# Patient Record
Sex: MALE | Race: WHITE | Employment: OTHER | URBAN - METROPOLITAN AREA
[De-identification: names, ages, dates, MRNs, and addresses within clinical notes are randomized per-mention and may not be internally consistent; named-entity substitution may affect disease eponyms.]

---

## 2017-01-05 ENCOUNTER — HOSPITAL ENCOUNTER (OUTPATIENT)
Dept: PHARMACY | Age: 77
Discharge: HOME | End: 2017-01-05
Payer: MEDICARE

## 2017-01-05 DIAGNOSIS — T81.718D: ICD-10-CM

## 2017-01-05 LAB
INR BLD: 3.1
PROTIME: 36.7 SECONDS

## 2017-01-05 PROCEDURE — G0463 HOSPITAL OUTPT CLINIC VISIT: HCPCS

## 2017-01-05 PROCEDURE — 85610 PROTHROMBIN TIME: CPT

## 2017-02-17 ENCOUNTER — HOSPITAL ENCOUNTER (OUTPATIENT)
Dept: PHARMACY | Age: 77
Discharge: HOME | End: 2017-02-17
Payer: MEDICARE

## 2017-02-17 ENCOUNTER — HOSPITAL ENCOUNTER (OUTPATIENT)
Dept: CARDIOLOGY | Age: 77
Discharge: HOME | End: 2017-02-17
Payer: MEDICARE

## 2017-02-17 DIAGNOSIS — T81.718D: ICD-10-CM

## 2017-02-17 LAB
INR BLD: 3.1
PROTIME: 37.7 SECONDS

## 2017-02-17 PROCEDURE — G0463 HOSPITAL OUTPT CLINIC VISIT: HCPCS

## 2017-02-17 PROCEDURE — 85610 PROTHROMBIN TIME: CPT

## 2017-02-17 PROCEDURE — 93280 PM DEVICE PROGR EVAL DUAL: CPT

## 2017-03-17 ENCOUNTER — HOSPITAL ENCOUNTER (OUTPATIENT)
Dept: PHARMACY | Age: 77
Discharge: HOME | End: 2017-03-17
Payer: MEDICARE

## 2017-03-17 DIAGNOSIS — T81.718A: ICD-10-CM

## 2017-03-17 LAB
INR BLD: 3.1
PROTIME: 37.7 SECONDS

## 2017-03-17 PROCEDURE — G0463 HOSPITAL OUTPT CLINIC VISIT: HCPCS

## 2017-03-17 PROCEDURE — 85610 PROTHROMBIN TIME: CPT

## 2017-04-25 RX ORDER — WARFARIN SODIUM 5 MG/1
TABLET ORAL
Qty: 110 TABLET | Refills: 1 | Status: SHIPPED | OUTPATIENT
Start: 2017-04-25 | End: 2017-11-03 | Stop reason: SDUPTHER

## 2017-04-25 RX ORDER — WARFARIN SODIUM 5 MG/1
5 TABLET ORAL
COMMUNITY
End: 2017-04-25 | Stop reason: SDUPTHER

## 2017-04-28 ENCOUNTER — HOSPITAL ENCOUNTER (OUTPATIENT)
Dept: PHARMACY | Age: 77
Discharge: HOME | End: 2017-04-28
Payer: MEDICARE

## 2017-04-28 DIAGNOSIS — T81.718D: ICD-10-CM

## 2017-04-28 LAB
INR BLD: 2.7
PROTIME: 31.9 SECONDS

## 2017-04-28 PROCEDURE — 99211 OFF/OP EST MAY X REQ PHY/QHP: CPT | Performed by: PHARMACIST

## 2017-04-28 PROCEDURE — 85610 PROTHROMBIN TIME: CPT | Performed by: PHARMACIST

## 2017-05-22 ENCOUNTER — HOSPITAL ENCOUNTER (OUTPATIENT)
Dept: CARDIOLOGY | Age: 77
Discharge: HOME | End: 2017-05-22
Payer: MEDICARE

## 2017-05-22 PROCEDURE — 93296 REM INTERROG EVL PM/IDS: CPT

## 2017-06-09 ENCOUNTER — HOSPITAL ENCOUNTER (OUTPATIENT)
Dept: PHARMACY | Age: 77
Discharge: HOME | End: 2017-06-09
Payer: MEDICARE

## 2017-06-09 DIAGNOSIS — T81.718D: ICD-10-CM

## 2017-06-09 LAB
INR BLD: 3.4
PROTIME: 40.7 SECONDS

## 2017-06-09 PROCEDURE — 85610 PROTHROMBIN TIME: CPT

## 2017-06-09 PROCEDURE — 99211 OFF/OP EST MAY X REQ PHY/QHP: CPT

## 2017-07-21 ENCOUNTER — HOSPITAL ENCOUNTER (OUTPATIENT)
Dept: PHARMACY | Age: 77
Setting detail: THERAPIES SERIES
Discharge: HOME | End: 2017-07-21
Payer: MEDICARE

## 2017-07-21 DIAGNOSIS — T81.718A: ICD-10-CM

## 2017-07-21 LAB
INR BLD: 2.3
PROTIME: 27.3 SECONDS

## 2017-07-21 PROCEDURE — 85610 PROTHROMBIN TIME: CPT

## 2017-07-21 PROCEDURE — 99211 OFF/OP EST MAY X REQ PHY/QHP: CPT

## 2017-08-21 ENCOUNTER — HOSPITAL ENCOUNTER (OUTPATIENT)
Dept: CARDIOLOGY | Age: 77
Discharge: HOME | End: 2017-08-21
Payer: MEDICARE

## 2017-08-21 PROCEDURE — 93280 PM DEVICE PROGR EVAL DUAL: CPT

## 2017-09-01 ENCOUNTER — HOSPITAL ENCOUNTER (OUTPATIENT)
Dept: PHARMACY | Age: 77
Setting detail: THERAPIES SERIES
Discharge: HOME | End: 2017-09-01
Payer: MEDICARE

## 2017-09-01 ENCOUNTER — TELEPHONE (OUTPATIENT)
Dept: PHARMACY | Age: 77
End: 2017-09-01

## 2017-09-01 ENCOUNTER — ANTI-COAG VISIT (OUTPATIENT)
Dept: PHARMACY | Age: 77
End: 2017-09-01

## 2017-09-01 DIAGNOSIS — T81.718S: ICD-10-CM

## 2017-09-01 LAB
INR BLD: 3.5
PROTIME: 41.7 SECONDS

## 2017-09-01 PROCEDURE — 85610 PROTHROMBIN TIME: CPT

## 2017-09-01 PROCEDURE — 99211 OFF/OP EST MAY X REQ PHY/QHP: CPT

## 2017-09-01 RX ORDER — M-VIT,TX,IRON,MINS/CALC/FOLIC 27MG-0.4MG
1 TABLET ORAL DAILY
Status: ON HOLD | COMMUNITY
End: 2019-07-16

## 2017-09-01 RX ORDER — HYDROCHLOROTHIAZIDE 25 MG/1
25 TABLET ORAL DAILY
COMMUNITY
End: 2018-04-24 | Stop reason: ALTCHOICE

## 2017-09-01 RX ORDER — METOPROLOL SUCCINATE 25 MG/1
100 TABLET, EXTENDED RELEASE ORAL DAILY
COMMUNITY
End: 2017-11-03 | Stop reason: DRUGHIGH

## 2017-09-22 ENCOUNTER — HOSPITAL ENCOUNTER (OUTPATIENT)
Dept: PHARMACY | Age: 77
Setting detail: THERAPIES SERIES
Discharge: HOME | End: 2017-09-22
Payer: MEDICARE

## 2017-09-22 DIAGNOSIS — T81.718D: ICD-10-CM

## 2017-09-22 LAB
INR BLD: 2.5
PROTIME: 29.6 SECONDS

## 2017-09-22 PROCEDURE — 85610 PROTHROMBIN TIME: CPT

## 2017-09-22 PROCEDURE — 99211 OFF/OP EST MAY X REQ PHY/QHP: CPT

## 2017-09-22 RX ORDER — LISINOPRIL 10 MG/1
10 TABLET ORAL DAILY
COMMUNITY
End: 2017-11-03 | Stop reason: DRUGHIGH

## 2017-11-03 ENCOUNTER — HOSPITAL ENCOUNTER (OUTPATIENT)
Dept: PHARMACY | Age: 77
Setting detail: THERAPIES SERIES
Discharge: HOME | End: 2017-11-03
Payer: MEDICARE

## 2017-11-03 LAB
INR BLD: 2.3
PROTIME: 27.2 SECONDS

## 2017-11-03 PROCEDURE — 99211 OFF/OP EST MAY X REQ PHY/QHP: CPT

## 2017-11-03 PROCEDURE — 85610 PROTHROMBIN TIME: CPT

## 2017-11-03 RX ORDER — SOTALOL HYDROCHLORIDE 120 MG/1
120 TABLET ORAL 2 TIMES DAILY
COMMUNITY
End: 2020-06-05

## 2017-11-03 RX ORDER — METOPROLOL SUCCINATE 100 MG/1
100 TABLET, EXTENDED RELEASE ORAL DAILY
Status: ON HOLD | COMMUNITY
End: 2022-05-25 | Stop reason: HOSPADM

## 2017-11-03 RX ORDER — AMLODIPINE BESYLATE 5 MG/1
5 TABLET ORAL DAILY
Status: ON HOLD | COMMUNITY
End: 2022-05-25 | Stop reason: HOSPADM

## 2017-11-03 RX ORDER — ATORVASTATIN CALCIUM 10 MG/1
5 TABLET, FILM COATED ORAL DAILY
COMMUNITY

## 2017-11-03 RX ORDER — WARFARIN SODIUM 5 MG/1
TABLET ORAL
Qty: 110 TABLET | Refills: 1 | Status: SHIPPED | OUTPATIENT
Start: 2017-11-03 | End: 2018-03-09 | Stop reason: SDUPTHER

## 2017-11-03 RX ORDER — LISINOPRIL 20 MG/1
20 TABLET ORAL 2 TIMES DAILY
Status: ON HOLD | COMMUNITY
End: 2022-05-25 | Stop reason: HOSPADM

## 2017-11-03 NOTE — PROGRESS NOTES
Mr. Kortney Cosby is a 68 y.o. y/o male with history of PE who presents today for anticoagulation monitoring and adjustment. INR 2.3 is therapeutic for this patient (goal range 2.0-3.0) and is reflective of 37.5 mg TWD  Patient verifies current dosing regimen, patient able to verbally recall dose  Patient reports zero  missed doses since last INR   Patient denies s/sx clotting and/or stroke  Patient denies hematuria, epistaxis, rectal bleeding  Patient denies changes in diet, alcohol, or tobacco use  Reviewed medication list and drug allergies with patient, updated any medication additions or modifications accordingly    Patient started on metoprolol succ, lisinopril, sotalol, atorvastatin, amlodipine. And HCTZ discontinued. All medication updates are reflected in medication list on chart. Patient also denies any pending medical or dental procedures scheduled at this time    Was in [address] [place] [address] a week after last INR visit due heart rhythm. Was discharged on Eliquis, however, was too expensive and warfarin was restarted on 10/19.     Patient was instructed to continue current plan of 37.5 mg TWD and RTC 6 weeks

## 2017-11-20 ENCOUNTER — HOSPITAL ENCOUNTER (OUTPATIENT)
Dept: CARDIOLOGY | Age: 77
Discharge: HOME | End: 2017-11-20
Payer: MEDICARE

## 2017-11-20 PROCEDURE — 93296 REM INTERROG EVL PM/IDS: CPT

## 2017-12-15 ENCOUNTER — HOSPITAL ENCOUNTER (OUTPATIENT)
Dept: PHARMACY | Age: 77
Setting detail: THERAPIES SERIES
Discharge: HOME | End: 2017-12-15
Payer: MEDICARE

## 2017-12-15 DIAGNOSIS — T81.718A IATROGENIC PULMONARY EMBOLISM, INITIAL ENCOUNTER (HCC): ICD-10-CM

## 2017-12-15 DIAGNOSIS — I26.99 IATROGENIC PULMONARY EMBOLISM, INITIAL ENCOUNTER (HCC): ICD-10-CM

## 2017-12-15 LAB
INR BLD: 3
PROTIME: 35.8 SECONDS

## 2017-12-15 PROCEDURE — 85610 PROTHROMBIN TIME: CPT

## 2017-12-15 PROCEDURE — 99211 OFF/OP EST MAY X REQ PHY/QHP: CPT

## 2018-01-26 ENCOUNTER — HOSPITAL ENCOUNTER (OUTPATIENT)
Dept: PHARMACY | Age: 78
Setting detail: THERAPIES SERIES
Discharge: HOME | End: 2018-01-26
Payer: MEDICARE

## 2018-01-26 DIAGNOSIS — I26.99 IATROGENIC PULMONARY EMBOLISM, SUBSEQUENT ENCOUNTER (HCC): ICD-10-CM

## 2018-01-26 DIAGNOSIS — T81.718D IATROGENIC PULMONARY EMBOLISM, SUBSEQUENT ENCOUNTER (HCC): ICD-10-CM

## 2018-01-26 LAB
INR BLD: 2.7
PROTIME: 32 SECONDS

## 2018-01-26 PROCEDURE — 99211 OFF/OP EST MAY X REQ PHY/QHP: CPT | Performed by: PHARMACIST

## 2018-01-26 PROCEDURE — 85610 PROTHROMBIN TIME: CPT | Performed by: PHARMACIST

## 2018-02-19 ENCOUNTER — HOSPITAL ENCOUNTER (OUTPATIENT)
Dept: CARDIOLOGY | Age: 78
Discharge: HOME | End: 2018-02-19
Payer: MEDICARE

## 2018-02-19 PROCEDURE — 93280 PM DEVICE PROGR EVAL DUAL: CPT

## 2018-03-09 ENCOUNTER — HOSPITAL ENCOUNTER (OUTPATIENT)
Dept: PHARMACY | Age: 78
Setting detail: THERAPIES SERIES
Discharge: HOME | End: 2018-03-09
Payer: MEDICARE

## 2018-03-09 DIAGNOSIS — T81.718D IATROGENIC PULMONARY EMBOLISM, SUBSEQUENT ENCOUNTER (HCC): ICD-10-CM

## 2018-03-09 DIAGNOSIS — I26.99 IATROGENIC PULMONARY EMBOLISM, SUBSEQUENT ENCOUNTER (HCC): ICD-10-CM

## 2018-03-09 LAB
INR BLD: 2.9
PROTIME: 35.4 SECONDS

## 2018-03-09 PROCEDURE — 99211 OFF/OP EST MAY X REQ PHY/QHP: CPT | Performed by: PHARMACIST

## 2018-03-09 PROCEDURE — 85610 PROTHROMBIN TIME: CPT | Performed by: PHARMACIST

## 2018-03-09 RX ORDER — WARFARIN SODIUM 5 MG/1
TABLET ORAL
Qty: 110 TABLET | Refills: 1 | Status: SHIPPED | OUTPATIENT
Start: 2018-03-09 | End: 2018-04-24 | Stop reason: ALTCHOICE

## 2018-04-20 ENCOUNTER — HOSPITAL ENCOUNTER (OUTPATIENT)
Dept: PHARMACY | Age: 78
Setting detail: THERAPIES SERIES
Discharge: HOME | End: 2018-04-20
Payer: MEDICARE

## 2018-04-20 DIAGNOSIS — T81.718D IATROGENIC PULMONARY EMBOLISM, SUBSEQUENT ENCOUNTER (HCC): ICD-10-CM

## 2018-04-20 DIAGNOSIS — I26.99 IATROGENIC PULMONARY EMBOLISM, SUBSEQUENT ENCOUNTER (HCC): ICD-10-CM

## 2018-04-20 LAB
INR BLD: 2.6
PROTIME: 31.8 SECONDS

## 2018-04-20 PROCEDURE — 85610 PROTHROMBIN TIME: CPT | Performed by: PHARMACIST

## 2018-04-20 PROCEDURE — 99211 OFF/OP EST MAY X REQ PHY/QHP: CPT | Performed by: PHARMACIST

## 2018-04-24 ENCOUNTER — OFFICE VISIT (OUTPATIENT)
Dept: PULMONOLOGY | Age: 78
End: 2018-04-24
Payer: MEDICARE

## 2018-04-24 VITALS
OXYGEN SATURATION: 98 % | RESPIRATION RATE: 16 BRPM | SYSTOLIC BLOOD PRESSURE: 110 MMHG | HEIGHT: 75 IN | HEART RATE: 79 BPM | BODY MASS INDEX: 32.08 KG/M2 | DIASTOLIC BLOOD PRESSURE: 64 MMHG | TEMPERATURE: 97 F | WEIGHT: 258 LBS

## 2018-04-24 DIAGNOSIS — G47.33 OSA ON CPAP: Primary | ICD-10-CM

## 2018-04-24 DIAGNOSIS — Z99.89 OSA ON CPAP: Primary | ICD-10-CM

## 2018-04-24 PROBLEM — G47.30 SLEEP APNEA: Status: ACTIVE | Noted: 2018-04-24

## 2018-04-24 PROCEDURE — 1123F ACP DISCUSS/DSCN MKR DOCD: CPT | Performed by: INTERNAL MEDICINE

## 2018-04-24 PROCEDURE — 99213 OFFICE O/P EST LOW 20 MIN: CPT | Performed by: INTERNAL MEDICINE

## 2018-04-24 PROCEDURE — G8417 CALC BMI ABV UP PARAM F/U: HCPCS | Performed by: INTERNAL MEDICINE

## 2018-04-24 PROCEDURE — 4040F PNEUMOC VAC/ADMIN/RCVD: CPT | Performed by: INTERNAL MEDICINE

## 2018-04-24 PROCEDURE — 1036F TOBACCO NON-USER: CPT | Performed by: INTERNAL MEDICINE

## 2018-04-24 PROCEDURE — G8427 DOCREV CUR MEDS BY ELIG CLIN: HCPCS | Performed by: INTERNAL MEDICINE

## 2018-04-24 RX ORDER — WARFARIN SODIUM 5 MG/1
5 TABLET ORAL DAILY
COMMUNITY
End: 2019-02-01 | Stop reason: SDUPTHER

## 2018-04-24 ASSESSMENT — ENCOUNTER SYMPTOMS
ABDOMINAL PAIN: 0
SHORTNESS OF BREATH: 1
SORE THROAT: 0
CHEST TIGHTNESS: 0
WHEEZING: 0
VOMITING: 0
RHINORRHEA: 0
NAUSEA: 0
DIARRHEA: 0
COUGH: 0
SINUS PRESSURE: 0

## 2018-06-01 ENCOUNTER — HOSPITAL ENCOUNTER (OUTPATIENT)
Dept: PHARMACY | Age: 78
Setting detail: THERAPIES SERIES
Discharge: HOME | End: 2018-06-01
Payer: MEDICARE

## 2018-06-01 DIAGNOSIS — T81.718S: ICD-10-CM

## 2018-06-01 DIAGNOSIS — I26.99: ICD-10-CM

## 2018-06-01 LAB
INR BLD: 3.2
PROTIME: 38.3 SECONDS

## 2018-06-01 PROCEDURE — 85610 PROTHROMBIN TIME: CPT

## 2018-06-01 PROCEDURE — 99211 OFF/OP EST MAY X REQ PHY/QHP: CPT

## 2018-07-06 ENCOUNTER — HOSPITAL ENCOUNTER (OUTPATIENT)
Dept: CARDIOLOGY | Age: 78
Discharge: HOME | End: 2018-07-06
Payer: MEDICARE

## 2018-07-06 PROCEDURE — 93296 REM INTERROG EVL PM/IDS: CPT

## 2018-07-13 ENCOUNTER — HOSPITAL ENCOUNTER (OUTPATIENT)
Dept: PHARMACY | Age: 78
Setting detail: THERAPIES SERIES
Discharge: HOME | End: 2018-07-13
Payer: MEDICARE

## 2018-07-13 DIAGNOSIS — T81.718D IATROGENIC PULMONARY EMBOLISM, SUBSEQUENT ENCOUNTER (HCC): ICD-10-CM

## 2018-07-13 DIAGNOSIS — I26.99 IATROGENIC PULMONARY EMBOLISM, SUBSEQUENT ENCOUNTER (HCC): ICD-10-CM

## 2018-07-13 LAB
INR BLD: 2.8
PROTIME: 33.4 SECONDS

## 2018-07-13 PROCEDURE — 85610 PROTHROMBIN TIME: CPT | Performed by: PHARMACIST

## 2018-07-13 PROCEDURE — 99211 OFF/OP EST MAY X REQ PHY/QHP: CPT | Performed by: PHARMACIST

## 2018-07-13 RX ORDER — TAMSULOSIN HYDROCHLORIDE 0.4 MG/1
0.4 CAPSULE ORAL DAILY
COMMUNITY
Start: 2018-06-26

## 2018-07-13 NOTE — PROGRESS NOTES
Mr. Justin Olivera is a 66 y.o. y/o male with history of PE who presents today for anticoagulation monitoring and adjustment.   INR 2.8 is therapeutic for this patient (goal range 2-3) and is reflective of 37.5 mg TWD  Patient verifies current dosing regimen, patient able to verbally recall dose  Patient reports no missed doses since last INR   Patient denies s/sx clotting and/or stroke  Patient denies hematuria, epistaxis, rectal bleeding  Patient denies changes in diet, alcohol, or tobacco use  Reviewed medication list and drug allergies with patient, updated any medication additions or modifications accordingly  Patient also denies any pending medical or dental procedures scheduled at this time  Patient was instructed to continue 37.5 mg TWD and RTC 6 weeks

## 2018-08-24 ENCOUNTER — HOSPITAL ENCOUNTER (OUTPATIENT)
Dept: PHARMACY | Age: 78
Setting detail: THERAPIES SERIES
Discharge: HOME | End: 2018-08-24
Payer: MEDICARE

## 2018-08-24 DIAGNOSIS — T81.718S: ICD-10-CM

## 2018-08-24 DIAGNOSIS — I26.99: ICD-10-CM

## 2018-08-24 LAB
INR BLD: 3.8
PROTIME: 45 SECONDS

## 2018-08-24 PROCEDURE — 99211 OFF/OP EST MAY X REQ PHY/QHP: CPT

## 2018-08-24 PROCEDURE — 85610 PROTHROMBIN TIME: CPT

## 2018-10-05 ENCOUNTER — HOSPITAL ENCOUNTER (OUTPATIENT)
Dept: PHARMACY | Age: 78
Setting detail: THERAPIES SERIES
Discharge: HOME | End: 2018-10-05
Payer: MEDICARE

## 2018-10-05 ENCOUNTER — HOSPITAL ENCOUNTER (OUTPATIENT)
Dept: CARDIOLOGY | Age: 78
Discharge: HOME | End: 2018-10-05
Payer: MEDICARE

## 2018-10-05 DIAGNOSIS — T81.718D IATROGENIC PULMONARY EMBOLISM, SUBSEQUENT ENCOUNTER (HCC): ICD-10-CM

## 2018-10-05 DIAGNOSIS — I26.99 IATROGENIC PULMONARY EMBOLISM, SUBSEQUENT ENCOUNTER (HCC): ICD-10-CM

## 2018-10-05 LAB
INR BLD: 3.8
PROTIME: 45.2 SECONDS

## 2018-10-05 PROCEDURE — 99211 OFF/OP EST MAY X REQ PHY/QHP: CPT | Performed by: PHARMACIST

## 2018-10-05 PROCEDURE — 93296 REM INTERROG EVL PM/IDS: CPT

## 2018-10-05 PROCEDURE — 85610 PROTHROMBIN TIME: CPT | Performed by: PHARMACIST

## 2018-11-02 ENCOUNTER — HOSPITAL ENCOUNTER (OUTPATIENT)
Dept: PHARMACY | Age: 78
Setting detail: THERAPIES SERIES
Discharge: HOME | End: 2018-11-02
Payer: MEDICARE

## 2018-11-02 DIAGNOSIS — I26.99 IATROGENIC PULMONARY EMBOLISM, SUBSEQUENT ENCOUNTER (HCC): ICD-10-CM

## 2018-11-02 DIAGNOSIS — T81.718D IATROGENIC PULMONARY EMBOLISM, SUBSEQUENT ENCOUNTER (HCC): ICD-10-CM

## 2018-11-02 PROCEDURE — 85610 PROTHROMBIN TIME: CPT | Performed by: PHARMACIST

## 2018-11-02 PROCEDURE — 99211 OFF/OP EST MAY X REQ PHY/QHP: CPT | Performed by: PHARMACIST

## 2018-11-30 ENCOUNTER — HOSPITAL ENCOUNTER (OUTPATIENT)
Dept: PHARMACY | Age: 78
Setting detail: THERAPIES SERIES
Discharge: HOME | End: 2018-11-30
Payer: MEDICARE

## 2018-11-30 LAB — INTERNATIONAL NORMALIZATION RATIO, POC: 4

## 2018-11-30 PROCEDURE — 99211 OFF/OP EST MAY X REQ PHY/QHP: CPT | Performed by: PHARMACIST

## 2018-11-30 PROCEDURE — 85610 PROTHROMBIN TIME: CPT | Performed by: PHARMACIST

## 2018-12-21 ENCOUNTER — HOSPITAL ENCOUNTER (OUTPATIENT)
Dept: PHARMACY | Age: 78
Setting detail: THERAPIES SERIES
Discharge: HOME | End: 2018-12-21
Payer: MEDICARE

## 2018-12-21 DIAGNOSIS — I26.99: ICD-10-CM

## 2018-12-21 DIAGNOSIS — T81.718S: ICD-10-CM

## 2018-12-21 LAB — INTERNATIONAL NORMALIZATION RATIO, POC: 2.8

## 2018-12-21 PROCEDURE — 85610 PROTHROMBIN TIME: CPT

## 2018-12-21 PROCEDURE — 99211 OFF/OP EST MAY X REQ PHY/QHP: CPT

## 2019-01-04 ENCOUNTER — HOSPITAL ENCOUNTER (OUTPATIENT)
Dept: CARDIOLOGY | Age: 79
Discharge: HOME | End: 2019-01-04
Payer: MEDICARE

## 2019-01-04 PROCEDURE — 93296 REM INTERROG EVL PM/IDS: CPT

## 2019-02-01 ENCOUNTER — HOSPITAL ENCOUNTER (OUTPATIENT)
Dept: PHARMACY | Age: 79
Setting detail: THERAPIES SERIES
Discharge: HOME | End: 2019-02-01
Payer: MEDICARE

## 2019-02-01 DIAGNOSIS — I26.99 IATROGENIC PULMONARY EMBOLISM, SUBSEQUENT ENCOUNTER (HCC): ICD-10-CM

## 2019-02-01 DIAGNOSIS — T81.718D IATROGENIC PULMONARY EMBOLISM, SUBSEQUENT ENCOUNTER (HCC): ICD-10-CM

## 2019-02-01 LAB — INTERNATIONAL NORMALIZATION RATIO, POC: 2.7

## 2019-02-01 PROCEDURE — 99211 OFF/OP EST MAY X REQ PHY/QHP: CPT | Performed by: PHARMACIST

## 2019-02-01 PROCEDURE — 85610 PROTHROMBIN TIME: CPT | Performed by: PHARMACIST

## 2019-02-01 RX ORDER — WARFARIN SODIUM 5 MG/1
TABLET ORAL
Qty: 100 TABLET | Refills: 1 | Status: SHIPPED | OUTPATIENT
Start: 2019-02-01 | End: 2019-08-06 | Stop reason: SDUPTHER

## 2019-03-15 ENCOUNTER — HOSPITAL ENCOUNTER (OUTPATIENT)
Dept: PHARMACY | Age: 79
Setting detail: THERAPIES SERIES
Discharge: HOME | End: 2019-03-15
Payer: MEDICARE

## 2019-03-15 DIAGNOSIS — I26.99 IATROGENIC PULMONARY EMBOLISM, SUBSEQUENT ENCOUNTER (HCC): ICD-10-CM

## 2019-03-15 DIAGNOSIS — T81.718D IATROGENIC PULMONARY EMBOLISM, SUBSEQUENT ENCOUNTER (HCC): ICD-10-CM

## 2019-03-15 LAB — INTERNATIONAL NORMALIZATION RATIO, POC: 3.1

## 2019-03-15 PROCEDURE — 99211 OFF/OP EST MAY X REQ PHY/QHP: CPT

## 2019-03-15 PROCEDURE — 85610 PROTHROMBIN TIME: CPT

## 2019-04-05 ENCOUNTER — HOSPITAL ENCOUNTER (OUTPATIENT)
Dept: CARDIOLOGY | Age: 79
Discharge: HOME | End: 2019-04-05
Payer: MEDICARE

## 2019-04-05 PROCEDURE — 93280 PM DEVICE PROGR EVAL DUAL: CPT

## 2019-04-26 ENCOUNTER — HOSPITAL ENCOUNTER (OUTPATIENT)
Dept: PHARMACY | Age: 79
Setting detail: THERAPIES SERIES
Discharge: HOME | End: 2019-04-26
Payer: MEDICARE

## 2019-04-26 LAB — INTERNATIONAL NORMALIZATION RATIO, POC: 1.7

## 2019-04-26 PROCEDURE — 85610 PROTHROMBIN TIME: CPT

## 2019-04-26 PROCEDURE — 99211 OFF/OP EST MAY X REQ PHY/QHP: CPT

## 2019-05-17 ENCOUNTER — HOSPITAL ENCOUNTER (OUTPATIENT)
Dept: PHARMACY | Age: 79
Setting detail: THERAPIES SERIES
Discharge: HOME | End: 2019-05-17
Payer: MEDICARE

## 2019-05-17 LAB — INTERNATIONAL NORMALIZATION RATIO, POC: 2.4

## 2019-05-17 PROCEDURE — 85610 PROTHROMBIN TIME: CPT

## 2019-05-17 PROCEDURE — 99211 OFF/OP EST MAY X REQ PHY/QHP: CPT

## 2019-06-28 ENCOUNTER — HOSPITAL ENCOUNTER (OUTPATIENT)
Dept: PHARMACY | Age: 79
Setting detail: THERAPIES SERIES
Discharge: HOME | End: 2019-06-28
Payer: MEDICARE

## 2019-06-28 LAB — INTERNATIONAL NORMALIZATION RATIO, POC: 2.4

## 2019-06-28 PROCEDURE — 99211 OFF/OP EST MAY X REQ PHY/QHP: CPT

## 2019-06-28 PROCEDURE — 85610 PROTHROMBIN TIME: CPT

## 2019-06-28 NOTE — PROGRESS NOTES
Mr. Tyler Membreno is a 78 y.o. y/o male with history of PE who presents today for anticoagulation monitoring and adjustment.   INR 2.4 is therapeutic for this patient (goal range 2-3) and is reflective of 32.5 mg TWD  Patient verifies current dosing regimen, patient able to verbally recall dose  Patient reports no  missed doses since last INR   Patient denies s/sx clotting and/or stroke  Patient denies hematuria, epistaxis, rectal bleeding  Patient denies changes in diet, alcohol, or tobacco use  Reviewed medication list and drug allergies with patient, updated any medication additions or modifications accordingly  Patient also denies any pending medical or dental procedures scheduled at this time  Patient was instructed to continue warfarin at 32.5mg TWD and RTC on 6 weeks

## 2019-07-08 ENCOUNTER — HOSPITAL ENCOUNTER (OUTPATIENT)
Dept: CARDIOLOGY | Age: 79
Discharge: HOME | End: 2019-07-08
Payer: MEDICARE

## 2019-07-08 PROCEDURE — 93296 REM INTERROG EVL PM/IDS: CPT

## 2019-07-16 ENCOUNTER — HOSPITAL ENCOUNTER (OUTPATIENT)
Dept: CARDIAC CATH/INVASIVE PROCEDURES | Age: 79
Discharge: HOME | End: 2019-07-16
Attending: INTERNAL MEDICINE | Admitting: INTERNAL MEDICINE
Payer: MEDICARE

## 2019-07-16 VITALS
SYSTOLIC BLOOD PRESSURE: 144 MMHG | DIASTOLIC BLOOD PRESSURE: 90 MMHG | RESPIRATION RATE: 14 BRPM | HEART RATE: 72 BPM | OXYGEN SATURATION: 100 %

## 2019-07-16 LAB
EKG ATRIAL RATE: 79 BPM
EKG P AXIS: 14 DEGREES
EKG P-R INTERVAL: 186 MS
EKG Q-T INTERVAL: 530 MS
EKG QRS DURATION: 198 MS
EKG QTC CALCULATION (BAZETT): 583 MS
EKG R AXIS: -76 DEGREES
EKG T AXIS: 82 DEGREES
EKG VENTRICULAR RATE: 73 BPM
INR BLD: 2.7
PROTHROMBIN TIME: 30.1 SEC (ref 12.3–14.9)

## 2019-07-16 PROCEDURE — 6360000002 HC RX W HCPCS: Performed by: INTERNAL MEDICINE

## 2019-07-16 PROCEDURE — 85610 PROTHROMBIN TIME: CPT

## 2019-07-16 PROCEDURE — 2580000003 HC RX 258: Performed by: INTERNAL MEDICINE

## 2019-07-16 PROCEDURE — 92960 CARDIOVERSION ELECTRIC EXT: CPT | Performed by: INTERNAL MEDICINE

## 2019-07-16 PROCEDURE — 93005 ELECTROCARDIOGRAM TRACING: CPT | Performed by: INTERNAL MEDICINE

## 2019-07-16 RX ORDER — SODIUM CHLORIDE 0.9 % (FLUSH) 0.9 %
10 SYRINGE (ML) INJECTION PRN
Status: DISCONTINUED | OUTPATIENT
Start: 2019-07-16 | End: 2019-07-16 | Stop reason: HOSPADM

## 2019-07-16 RX ORDER — BIOTIN 5 MG
1 TABLET ORAL SEE ADMIN INSTRUCTIONS
COMMUNITY

## 2019-07-16 RX ORDER — VITAMIN E 268 MG
400 CAPSULE ORAL EVERY OTHER DAY
COMMUNITY

## 2019-07-16 RX ORDER — SODIUM CHLORIDE 0.9 % (FLUSH) 0.9 %
10 SYRINGE (ML) INJECTION EVERY 12 HOURS SCHEDULED
Status: DISCONTINUED | OUTPATIENT
Start: 2019-07-16 | End: 2019-07-16 | Stop reason: HOSPADM

## 2019-07-16 RX ORDER — PROPOFOL 10 MG/ML
200 INJECTION, EMULSION INTRAVENOUS ONCE
Status: DISCONTINUED | OUTPATIENT
Start: 2019-07-16 | End: 2019-07-16 | Stop reason: HOSPADM

## 2019-07-16 RX ORDER — PROPOFOL 10 MG/ML
INJECTION, EMULSION INTRAVENOUS CONTINUOUS PRN
Status: COMPLETED | OUTPATIENT
Start: 2019-07-16 | End: 2019-07-16

## 2019-07-16 RX ORDER — ASCORBIC ACID 500 MG
1000 TABLET ORAL SEE ADMIN INSTRUCTIONS
COMMUNITY

## 2019-07-16 RX ORDER — UBIDECARENONE 30 MG/5 ML
10 LIQUID (ML) ORAL SEE ADMIN INSTRUCTIONS
COMMUNITY

## 2019-07-16 RX ORDER — MAGNESIUM OXIDE 400 MG/1
400 TABLET ORAL DAILY
COMMUNITY

## 2019-07-16 RX ORDER — TRIAMCINOLONE ACETONIDE 1 MG/G
CREAM TOPICAL 3 TIMES DAILY
Status: DISCONTINUED | OUTPATIENT
Start: 2019-07-16 | End: 2019-07-16 | Stop reason: HOSPADM

## 2019-07-16 RX ORDER — SODIUM CHLORIDE 9 MG/ML
INJECTION, SOLUTION INTRAVENOUS CONTINUOUS
Status: DISCONTINUED | OUTPATIENT
Start: 2019-07-16 | End: 2019-07-16 | Stop reason: HOSPADM

## 2019-07-16 RX ADMIN — PROPOFOL 30 MG: 10 INJECTION, EMULSION INTRAVENOUS at 14:08

## 2019-07-16 RX ADMIN — PROPOFOL 10 MG: 10 INJECTION, EMULSION INTRAVENOUS at 14:09

## 2019-07-16 RX ADMIN — PROPOFOL 20 MG: 10 INJECTION, EMULSION INTRAVENOUS at 14:09

## 2019-07-16 RX ADMIN — SODIUM CHLORIDE: 9 INJECTION, SOLUTION INTRAVENOUS at 13:44

## 2019-07-16 NOTE — OP NOTE
Patient Name: Kirsten Ramachandran   Medical Record Number: [id number]  Date: 7/16/2019   Time: 2:15 PM   Room/Bed: [facility] Cath Lab Pool/NONE  Cardioversion Procedure Note  Indication: atrial fibrillation    Consent: The patient was counseled regarding the procedure, its indications, risks, potential complications and alternatives, and any questions were answered. Consent was obtained to proceed. The device was interrogated and reprogrammed prior to the procedure. Pre-Medication: propofol intravenously    Procedure: The patient was placed in the supine position and the chest area was exposed. The cardioversion pads were applied in the standard manner and configuration. Attempt #1: The defibrillator was set on the synchronous mode and charged to 200 joules. A charge was then delivered which resulted in no change in rhythm. Attempt #2: The defibrillator was set on the synchronous and charged to 360 joules. A charge was then delivered which resulted in  conversion to normal sinus rhythm. Attempt #3: Not necessary    The patient tolerated the procedure well. Complications: None    The device was interrogated and reprogrammed prior to the procedure.     Kiel Arthur MD

## 2019-08-06 RX ORDER — WARFARIN SODIUM 5 MG/1
TABLET ORAL
Qty: 100 TABLET | Refills: 1 | Status: SHIPPED | OUTPATIENT
Start: 2019-08-06 | End: 2019-08-12 | Stop reason: SDUPTHER

## 2019-08-07 ENCOUNTER — HOSPITAL ENCOUNTER (OUTPATIENT)
Dept: CARDIOLOGY | Age: 79
Discharge: HOME | End: 2019-08-07
Payer: MEDICARE

## 2019-08-07 PROCEDURE — 93296 REM INTERROG EVL PM/IDS: CPT

## 2019-08-09 ENCOUNTER — HOSPITAL ENCOUNTER (OUTPATIENT)
Dept: PHARMACY | Age: 79
Setting detail: THERAPIES SERIES
Discharge: HOME | End: 2019-08-09
Payer: MEDICARE

## 2019-08-09 DIAGNOSIS — T81.718D IATROGENIC PULMONARY EMBOLISM, SUBSEQUENT ENCOUNTER (HCC): ICD-10-CM

## 2019-08-09 DIAGNOSIS — I26.99 IATROGENIC PULMONARY EMBOLISM, SUBSEQUENT ENCOUNTER (HCC): ICD-10-CM

## 2019-08-09 LAB — INTERNATIONAL NORMALIZATION RATIO, POC: 2.3

## 2019-08-09 PROCEDURE — 85610 PROTHROMBIN TIME: CPT | Performed by: PHARMACIST

## 2019-08-09 PROCEDURE — 99211 OFF/OP EST MAY X REQ PHY/QHP: CPT | Performed by: PHARMACIST

## 2019-08-12 RX ORDER — WARFARIN SODIUM 5 MG/1
TABLET ORAL
Qty: 100 TABLET | Refills: 1 | Status: SHIPPED | OUTPATIENT
Start: 2019-08-12 | End: 2020-01-21 | Stop reason: SDUPTHER

## 2019-09-11 ENCOUNTER — HOSPITAL ENCOUNTER (OUTPATIENT)
Dept: CARDIOLOGY | Age: 79
Discharge: HOME | End: 2019-09-11
Payer: MEDICARE

## 2019-09-11 PROCEDURE — 93296 REM INTERROG EVL PM/IDS: CPT

## 2019-09-20 ENCOUNTER — HOSPITAL ENCOUNTER (OUTPATIENT)
Dept: PHARMACY | Age: 79
Setting detail: THERAPIES SERIES
Discharge: HOME | End: 2019-09-20
Payer: MEDICARE

## 2019-09-20 LAB — INTERNATIONAL NORMALIZATION RATIO, POC: 2.2

## 2019-09-20 PROCEDURE — 85610 PROTHROMBIN TIME: CPT

## 2019-09-20 PROCEDURE — 99211 OFF/OP EST MAY X REQ PHY/QHP: CPT

## 2019-09-27 ENCOUNTER — HOSPITAL ENCOUNTER (OUTPATIENT)
Dept: CARDIAC CATH/INVASIVE PROCEDURES | Age: 79
Setting detail: OUTPATIENT SURGERY
Discharge: HOME | End: 2019-09-27
Attending: INTERNAL MEDICINE | Admitting: INTERNAL MEDICINE
Payer: MEDICARE

## 2019-09-27 VITALS
HEIGHT: 75 IN | BODY MASS INDEX: 31.08 KG/M2 | HEART RATE: 71 BPM | SYSTOLIC BLOOD PRESSURE: 128 MMHG | WEIGHT: 250 LBS | OXYGEN SATURATION: 97 % | DIASTOLIC BLOOD PRESSURE: 112 MMHG | RESPIRATION RATE: 26 BRPM

## 2019-09-27 LAB
ANION GAP SERPL CALCULATED.3IONS-SCNC: 11 MEQ/L (ref 9–15)
BUN BLDV-MCNC: 15 MG/DL (ref 8–23)
CALCIUM SERPL-MCNC: 9 MG/DL (ref 8.5–9.9)
CHLORIDE BLD-SCNC: 103 MEQ/L (ref 95–107)
CO2: 26 MEQ/L (ref 20–31)
CREAT SERPL-MCNC: 0.83 MG/DL (ref 0.7–1.2)
EKG ATRIAL RATE: 61 BPM
EKG Q-T INTERVAL: 504 MS
EKG QRS DURATION: 168 MS
EKG QTC CALCULATION (BAZETT): 524 MS
EKG R AXIS: -77 DEGREES
EKG T AXIS: 86 DEGREES
EKG VENTRICULAR RATE: 65 BPM
GFR AFRICAN AMERICAN: >60
GFR NON-AFRICAN AMERICAN: >60
GLUCOSE BLD-MCNC: 106 MG/DL (ref 70–99)
HCT VFR BLD CALC: 41.9 % (ref 42–52)
HEMOGLOBIN: 14 G/DL (ref 14–18)
INR BLD: 1.2
MCH RBC QN AUTO: 29.5 PG (ref 27–31.3)
MCHC RBC AUTO-ENTMCNC: 33.5 % (ref 33–37)
MCV RBC AUTO: 88.1 FL (ref 80–100)
PDW BLD-RTO: 14.4 % (ref 11.5–14.5)
PLATELET # BLD: 181 K/UL (ref 130–400)
POTASSIUM REFLEX MAGNESIUM: 3.9 MEQ/L (ref 3.4–4.9)
PROTHROMBIN TIME: 15.8 SEC (ref 12.3–14.9)
RBC # BLD: 4.75 M/UL (ref 4.7–6.1)
SODIUM BLD-SCNC: 140 MEQ/L (ref 135–144)
WBC # BLD: 4.9 K/UL (ref 4.8–10.8)

## 2019-09-27 PROCEDURE — C1785 PMKR, DUAL, RATE-RESP: HCPCS

## 2019-09-27 PROCEDURE — 85610 PROTHROMBIN TIME: CPT

## 2019-09-27 PROCEDURE — 93005 ELECTROCARDIOGRAM TRACING: CPT | Performed by: INTERNAL MEDICINE

## 2019-09-27 PROCEDURE — 2580000003 HC RX 258

## 2019-09-27 PROCEDURE — 6360000002 HC RX W HCPCS

## 2019-09-27 PROCEDURE — 80048 BASIC METABOLIC PNL TOTAL CA: CPT

## 2019-09-27 PROCEDURE — 33228 REMV&REPLC PM GEN DUAL LEAD: CPT | Performed by: INTERNAL MEDICINE

## 2019-09-27 PROCEDURE — 6370000000 HC RX 637 (ALT 250 FOR IP)

## 2019-09-27 PROCEDURE — 85027 COMPLETE CBC AUTOMATED: CPT

## 2019-09-27 PROCEDURE — 2500000003 HC RX 250 WO HCPCS

## 2019-09-27 RX ORDER — SODIUM CHLORIDE 450 MG/100ML
INJECTION, SOLUTION INTRAVENOUS CONTINUOUS
Status: DISCONTINUED | OUTPATIENT
Start: 2019-09-27 | End: 2019-09-27 | Stop reason: HOSPADM

## 2019-09-27 RX ORDER — ACETAMINOPHEN 325 MG/1
650 TABLET ORAL EVERY 4 HOURS PRN
Status: DISCONTINUED | OUTPATIENT
Start: 2019-09-27 | End: 2019-09-27 | Stop reason: HOSPADM

## 2019-09-27 RX ORDER — SODIUM CHLORIDE 0.9 % (FLUSH) 0.9 %
10 SYRINGE (ML) INJECTION EVERY 12 HOURS SCHEDULED
Status: DISCONTINUED | OUTPATIENT
Start: 2019-09-27 | End: 2019-09-27 | Stop reason: HOSPADM

## 2019-09-27 RX ORDER — SODIUM CHLORIDE 0.9 % (FLUSH) 0.9 %
10 SYRINGE (ML) INJECTION PRN
Status: DISCONTINUED | OUTPATIENT
Start: 2019-09-27 | End: 2019-09-27 | Stop reason: HOSPADM

## 2019-09-27 RX ORDER — ONDANSETRON 2 MG/ML
4 INJECTION INTRAMUSCULAR; INTRAVENOUS EVERY 6 HOURS PRN
Status: DISCONTINUED | OUTPATIENT
Start: 2019-09-27 | End: 2019-09-27 | Stop reason: HOSPADM

## 2019-10-04 ENCOUNTER — HOSPITAL ENCOUNTER (OUTPATIENT)
Dept: PHARMACY | Age: 79
Setting detail: THERAPIES SERIES
Discharge: HOME | End: 2019-10-04
Payer: MEDICARE

## 2019-10-04 DIAGNOSIS — I26.99 IATROGENIC PULMONARY EMBOLISM, SUBSEQUENT ENCOUNTER (HCC): ICD-10-CM

## 2019-10-04 DIAGNOSIS — T81.718D IATROGENIC PULMONARY EMBOLISM, SUBSEQUENT ENCOUNTER (HCC): ICD-10-CM

## 2019-10-04 LAB — INTERNATIONAL NORMALIZATION RATIO, POC: 1.6

## 2019-10-04 PROCEDURE — 85610 PROTHROMBIN TIME: CPT

## 2019-10-04 PROCEDURE — 99211 OFF/OP EST MAY X REQ PHY/QHP: CPT

## 2019-10-18 ENCOUNTER — HOSPITAL ENCOUNTER (OUTPATIENT)
Dept: PHARMACY | Age: 79
Setting detail: THERAPIES SERIES
Discharge: HOME | End: 2019-10-18
Payer: MEDICARE

## 2019-10-18 DIAGNOSIS — I26.99 IATROGENIC PULMONARY EMBOLISM, INITIAL ENCOUNTER (HCC): ICD-10-CM

## 2019-10-18 DIAGNOSIS — T81.718A IATROGENIC PULMONARY EMBOLISM, INITIAL ENCOUNTER (HCC): ICD-10-CM

## 2019-10-18 LAB — INTERNATIONAL NORMALIZATION RATIO, POC: 2.7

## 2019-10-18 PROCEDURE — 85610 PROTHROMBIN TIME: CPT

## 2019-10-18 PROCEDURE — 99211 OFF/OP EST MAY X REQ PHY/QHP: CPT

## 2019-11-22 ENCOUNTER — HOSPITAL ENCOUNTER (OUTPATIENT)
Dept: PHARMACY | Age: 79
Setting detail: THERAPIES SERIES
Discharge: HOME | End: 2019-11-22
Payer: MEDICARE

## 2019-11-22 DIAGNOSIS — T81.718D IATROGENIC PULMONARY EMBOLISM, SUBSEQUENT ENCOUNTER (HCC): ICD-10-CM

## 2019-11-22 DIAGNOSIS — I26.99 IATROGENIC PULMONARY EMBOLISM, SUBSEQUENT ENCOUNTER (HCC): ICD-10-CM

## 2019-11-22 LAB — INTERNATIONAL NORMALIZATION RATIO, POC: 3.1

## 2019-11-22 PROCEDURE — 99211 OFF/OP EST MAY X REQ PHY/QHP: CPT

## 2019-11-22 PROCEDURE — 85610 PROTHROMBIN TIME: CPT

## 2019-12-13 ENCOUNTER — HOSPITAL ENCOUNTER (OUTPATIENT)
Dept: PHARMACY | Age: 79
Setting detail: THERAPIES SERIES
Discharge: HOME | End: 2019-12-13
Payer: MEDICARE

## 2019-12-13 DIAGNOSIS — T81.718D IATROGENIC PULMONARY EMBOLISM, SUBSEQUENT ENCOUNTER (HCC): ICD-10-CM

## 2019-12-13 DIAGNOSIS — I26.99 IATROGENIC PULMONARY EMBOLISM, SUBSEQUENT ENCOUNTER (HCC): ICD-10-CM

## 2019-12-13 LAB — INTERNATIONAL NORMALIZATION RATIO, POC: 2.6

## 2019-12-13 PROCEDURE — 85610 PROTHROMBIN TIME: CPT

## 2019-12-13 PROCEDURE — 99211 OFF/OP EST MAY X REQ PHY/QHP: CPT

## 2019-12-31 ENCOUNTER — HOSPITAL ENCOUNTER (OUTPATIENT)
Dept: CARDIOLOGY | Age: 79
Discharge: HOME | End: 2019-12-31
Payer: MEDICARE

## 2019-12-31 PROCEDURE — 93296 REM INTERROG EVL PM/IDS: CPT

## 2020-01-21 RX ORDER — WARFARIN SODIUM 5 MG/1
TABLET ORAL
Qty: 100 TABLET | Refills: 1 | Status: SHIPPED | OUTPATIENT
Start: 2020-01-21 | End: 2020-07-09 | Stop reason: SDUPTHER

## 2020-01-24 ENCOUNTER — HOSPITAL ENCOUNTER (OUTPATIENT)
Dept: PHARMACY | Age: 80
Setting detail: THERAPIES SERIES
Discharge: HOME | End: 2020-01-24
Payer: MEDICARE

## 2020-01-24 LAB — INTERNATIONAL NORMALIZATION RATIO, POC: 1.7

## 2020-01-24 PROCEDURE — 99211 OFF/OP EST MAY X REQ PHY/QHP: CPT | Performed by: PHARMACIST

## 2020-01-24 PROCEDURE — 85610 PROTHROMBIN TIME: CPT | Performed by: PHARMACIST

## 2020-01-24 NOTE — PROGRESS NOTES
Mr. Milton Cline is a [de-identified] y.o. y/o male with history of PE who presents today for anticoagulation monitoring and adjustment.   INR 1.7 is sub-therapeutic for this patient (goal range 2-3) and is reflective of 32.5 mg TWD  Patient verifies current dosing regimen, patient able to verbally recall dose  Patient reports 1  missed doses since last INR   Patient denies s/sx clotting and/or stroke  Patient denies hematuria, epistaxis, rectal bleeding  Patient denies changes in diet, alcohol, or tobacco use  Reviewed medication list and drug allergies with patient, updated any medication additions or modifications accordingly  Patient also denies any pending medical or dental procedures scheduled at this time  Patient was instructed to begin 35 mg TWD (an increase of 7.7%) and RTC 3 weeks

## 2020-02-14 ENCOUNTER — HOSPITAL ENCOUNTER (OUTPATIENT)
Dept: PHARMACY | Age: 80
Setting detail: THERAPIES SERIES
Discharge: HOME | End: 2020-02-14
Payer: MEDICARE

## 2020-02-14 LAB — INTERNATIONAL NORMALIZATION RATIO, POC: 2.3

## 2020-02-14 PROCEDURE — 85610 PROTHROMBIN TIME: CPT | Performed by: PHARMACIST

## 2020-02-14 PROCEDURE — 99211 OFF/OP EST MAY X REQ PHY/QHP: CPT | Performed by: PHARMACIST

## 2020-02-14 NOTE — PROGRESS NOTES
Mr. Giovanni Quintero is a [de-identified] y.o. y/o male with history of PE who presents today for anticoagulation monitoring and adjustment. INR 2.3 is therapeutic for this patient (goal range 2-3) and is reflective of 35 mg TWD  Patient verifies current dosing regimen, patient able to verbally recall dose  Patient reports no  missed doses since last INR   Patient denies s/sx clotting and/or stroke  Patient denies hematuria, epistaxis, rectal bleeding  Patient denies changes in diet, alcohol, or tobacco use  Reviewed medication list and drug allergies with patient, updated any medication additions or modifications accordingly. Pt still drinking smoothies (vit K) and added periwinkle, fish oil daily.   Patient also denies any pending medical or dental procedures scheduled at this time  Patient was instructed to continue 35mg TWD and RTC 4 weeks

## 2020-03-13 ENCOUNTER — HOSPITAL ENCOUNTER (OUTPATIENT)
Dept: PHARMACY | Age: 80
Setting detail: THERAPIES SERIES
Discharge: HOME | End: 2020-03-13
Payer: MEDICARE

## 2020-03-13 LAB — INTERNATIONAL NORMALIZATION RATIO, POC: 2.6

## 2020-03-13 PROCEDURE — 99211 OFF/OP EST MAY X REQ PHY/QHP: CPT

## 2020-03-13 PROCEDURE — 85610 PROTHROMBIN TIME: CPT

## 2020-03-31 ENCOUNTER — HOSPITAL ENCOUNTER (OUTPATIENT)
Dept: CARDIOLOGY | Age: 80
Discharge: HOME | End: 2020-03-31
Payer: MEDICARE

## 2020-03-31 PROCEDURE — 93296 REM INTERROG EVL PM/IDS: CPT

## 2020-04-24 ENCOUNTER — HOSPITAL ENCOUNTER (OUTPATIENT)
Dept: PHARMACY | Age: 80
Setting detail: THERAPIES SERIES
Discharge: HOME | End: 2020-04-24
Payer: MEDICARE

## 2020-04-24 DIAGNOSIS — I26.99 PULMONARY EMBOLISM, OTHER, UNSPECIFIED CHRONICITY, UNSPECIFIED WHETHER ACUTE COR PULMONALE PRESENT (HCC): ICD-10-CM

## 2020-04-24 LAB
INR BLD: 2.1
PROTHROMBIN TIME: 24.7 SEC (ref 12.3–14.9)

## 2020-04-24 PROCEDURE — 99211 OFF/OP EST MAY X REQ PHY/QHP: CPT | Performed by: PHARMACIST

## 2020-06-05 ENCOUNTER — HOSPITAL ENCOUNTER (OUTPATIENT)
Dept: PHARMACY | Age: 80
Setting detail: THERAPIES SERIES
Discharge: HOME | End: 2020-06-05
Payer: MEDICARE

## 2020-06-05 DIAGNOSIS — I26.99 PULMONARY EMBOLISM, OTHER, UNSPECIFIED CHRONICITY, UNSPECIFIED WHETHER ACUTE COR PULMONALE PRESENT (HCC): ICD-10-CM

## 2020-06-05 LAB
INR BLD: 2.4
PROTHROMBIN TIME: 26 SEC (ref 12.3–14.9)

## 2020-06-05 PROCEDURE — 99211 OFF/OP EST MAY X REQ PHY/QHP: CPT

## 2020-06-30 ENCOUNTER — HOSPITAL ENCOUNTER (OUTPATIENT)
Dept: CARDIOLOGY | Age: 80
Discharge: HOME | End: 2020-06-30
Payer: MEDICARE

## 2020-06-30 PROCEDURE — 93280 PM DEVICE PROGR EVAL DUAL: CPT

## 2020-07-09 RX ORDER — WARFARIN SODIUM 5 MG/1
TABLET ORAL
Qty: 100 TABLET | Refills: 1 | Status: SHIPPED | OUTPATIENT
Start: 2020-07-09 | End: 2021-01-04 | Stop reason: SDUPTHER

## 2020-07-17 ENCOUNTER — HOSPITAL ENCOUNTER (OUTPATIENT)
Dept: PHARMACY | Age: 80
Setting detail: THERAPIES SERIES
Discharge: HOME | End: 2020-07-17
Payer: MEDICARE

## 2020-07-17 VITALS — TEMPERATURE: 96.8 F

## 2020-07-17 LAB — INTERNATIONAL NORMALIZATION RATIO, POC: 2.4

## 2020-07-17 PROCEDURE — 85610 PROTHROMBIN TIME: CPT | Performed by: PHARMACIST

## 2020-07-17 PROCEDURE — 99211 OFF/OP EST MAY X REQ PHY/QHP: CPT | Performed by: PHARMACIST

## 2020-07-17 NOTE — PROGRESS NOTES
Mr. Eleanor Hughes is a [de-identified] y.o. y/o male with history of PE who presents today for anticoagulation monitoring and adjustment.   INR 2.4 is therapeutic for this patient (goal range 2-3) and is reflective of 35 mg TWD  Patient verifies current dosing regimen, patient able to verbally recall dose  Patient reports 0  missed doses since last INR   Patient denies s/sx clotting and/or stroke  Patient denies hematuria, epistaxis, rectal bleeding  Patient denies changes in diet, alcohol, or tobacco use  Reviewed medication list and drug allergies with patient, updated any medication additions or modifications accordingly  Patient also denies any pending medical or dental procedures scheduled at this time  Patient was instructed to continue 35 mg TWD and RTC 6 weeks    CLINICAL PHARMACY CONSULT: MED RECONCILIATION/REVIEW [address]: No  Total # of Interventions Recommended: 0    - Maintenance Safety Lab Monitoring #: 1    Total Interventions Accepted: 0  Time Spent (min): [id number], [place] [address]

## 2020-08-28 ENCOUNTER — HOSPITAL ENCOUNTER (OUTPATIENT)
Dept: PHARMACY | Age: 80
Setting detail: THERAPIES SERIES
Discharge: HOME | End: 2020-08-28
Payer: MEDICARE

## 2020-08-28 VITALS — TEMPERATURE: 97.2 F

## 2020-08-28 LAB — INTERNATIONAL NORMALIZATION RATIO, POC: 3

## 2020-08-28 PROCEDURE — 85610 PROTHROMBIN TIME: CPT | Performed by: PHARMACIST

## 2020-08-28 PROCEDURE — 99211 OFF/OP EST MAY X REQ PHY/QHP: CPT | Performed by: PHARMACIST

## 2020-08-28 NOTE — PROGRESS NOTES
Mr. La Beckham is a [de-identified] y.o. y/o male with history of PE who presents today for anticoagulation monitoring and adjustment. INR 3.0 is therapeutic for this patient (goal range 2-3) and is reflective of 35 mg TWD  Patient verifies current dosing regimen, patient able to verbally recall dose  Patient reports no  missed doses since last INR   Patient denies s/sx clotting and/or stroke  Patient denies hematuria, epistaxis, rectal bleeding  Patient denies changes in diet, alcohol, or tobacco use  Reviewed medication list and drug allergies with patient, updated any medication additions or modifications accordingly  Patient also denies any pending medical or dental procedures scheduled at this time  Patient was instructed to consintu 35mg TWD and RTC 6 weeks      For Pharmacy Admin Tracking Only    PHSO: No  Total # of Interventions Recommended: 2  - Updated Order #: 1 Updated Order Reason(s):  Other  - Maintenance Safety Lab Monitoring #: 1  Total Interventions Accepted: 2  Time Spent (min): 15

## 2020-09-29 ENCOUNTER — HOSPITAL ENCOUNTER (OUTPATIENT)
Dept: CARDIOLOGY | Age: 80
Discharge: HOME | End: 2020-09-29
Payer: MEDICARE

## 2020-09-29 PROCEDURE — 93296 REM INTERROG EVL PM/IDS: CPT

## 2020-10-09 ENCOUNTER — HOSPITAL ENCOUNTER (OUTPATIENT)
Dept: PHARMACY | Age: 80
Setting detail: THERAPIES SERIES
Discharge: HOME | End: 2020-10-09
Payer: MEDICARE

## 2020-10-09 VITALS — TEMPERATURE: 96.5 F

## 2020-10-09 LAB — INTERNATIONAL NORMALIZATION RATIO, POC: 2.5

## 2020-10-09 PROCEDURE — 85610 PROTHROMBIN TIME: CPT

## 2020-10-09 PROCEDURE — 99211 OFF/OP EST MAY X REQ PHY/QHP: CPT

## 2020-10-09 NOTE — PROGRESS NOTES
Mr. Antonio Manjarrez is a [de-identified] y.o. y/o male with history of PE who presents today for anticoagulation monitoring and adjustment.   INR 2.5 is therapeutic for this patient (goal range 2-3) and is reflective of 35 mg TWD  Patient verifies current dosing regimen, patient able to verbally recall dose  Patient reports 0  missed doses since last INR   Patient denies s/sx clotting and/or stroke  Patient denies hematuria, epistaxis, rectal bleeding  Patient denies changes in diet, alcohol, or tobacco use  Reviewed medication list and drug allergies with patient, updated any medication additions or modifications accordingly  Patient also denies any pending medical or dental procedures scheduled at this time  Patient was instructed to continue 35 mg TWD and RTC 6 weeks    CLINICAL PHARMACY CONSULT: MED RECONCILIATION/REVIEW [address]: No  Total # of Interventions Recommended: 1    - Maintenance Safety Lab Monitoring #: 1    Total Interventions Accepted: 1  Time Spent (min): [address] [facility], [facility]

## 2020-11-20 ENCOUNTER — HOSPITAL ENCOUNTER (OUTPATIENT)
Dept: PHARMACY | Age: 80
Setting detail: THERAPIES SERIES
Discharge: HOME | End: 2020-11-20
Payer: MEDICARE

## 2020-11-20 VITALS — TEMPERATURE: 97 F

## 2020-11-20 LAB — INTERNATIONAL NORMALIZATION RATIO, POC: 4

## 2020-11-20 PROCEDURE — 85610 PROTHROMBIN TIME: CPT | Performed by: PHARMACIST

## 2020-11-20 PROCEDURE — 99211 OFF/OP EST MAY X REQ PHY/QHP: CPT | Performed by: PHARMACIST

## 2020-11-20 NOTE — PROGRESS NOTES
Mr. Pia Roque is a [de-identified] y.o. y/o male with history of PE who presents today for anticoagulation monitoring and adjustment. INR 4.0 is supratherapeutic for this patient (goal range 2-3) and is reflective of 35 mg TWD  Patient verifies current dosing regimen, patient able to verbally recall dose  Patient reports no  missed doses since last INR   Patient denies s/sx clotting and/or stroke  Patient denies hematuria, epistaxis, rectal bleeding  Patient denies changes in diet, alcohol, or tobacco use  Reviewed medication list and drug allergies with patient, updated any medication additions or modifications accordingly  Patient also denies any pending medical or dental procedures scheduled at this time. The patient started taking tumeric about 3 weeks ago, likely interaction w/ warfarin. The patient stated he tried tumeric a couple years ago and now remembers warfarin interaction. Patient also taking Morena Alejandro, not likely warfarin interaction for this. Patient states he will stop taking tumeric. Patient was instructed to hold dose today then continue 35mg TWD and RTC 3 weeks (usually 6 week RTC).     For Pharmacy Admin Tracking Only    PHSO: No  Total # of Interventions Recommended: 2  - Decreased Dose #: 1  - Maintenance Safety Lab Monitoring #: 1  Total Interventions Accepted: 2  Time Spent (min): 30

## 2020-12-11 ENCOUNTER — HOSPITAL ENCOUNTER (OUTPATIENT)
Dept: PHARMACY | Age: 80
Setting detail: THERAPIES SERIES
Discharge: HOME | End: 2020-12-11
Payer: MEDICARE

## 2020-12-11 VITALS — TEMPERATURE: 96.7 F

## 2020-12-11 LAB — INTERNATIONAL NORMALIZATION RATIO, POC: 2.2

## 2020-12-11 PROCEDURE — 99211 OFF/OP EST MAY X REQ PHY/QHP: CPT

## 2020-12-11 PROCEDURE — 85610 PROTHROMBIN TIME: CPT

## 2020-12-30 ENCOUNTER — HOSPITAL ENCOUNTER (OUTPATIENT)
Dept: CARDIOLOGY | Age: 80
Discharge: HOME | End: 2020-12-30
Payer: MEDICARE

## 2020-12-30 PROCEDURE — 93296 REM INTERROG EVL PM/IDS: CPT

## 2021-01-04 RX ORDER — WARFARIN SODIUM 5 MG/1
TABLET ORAL
Qty: 100 TABLET | Refills: 1 | Status: SHIPPED | OUTPATIENT
Start: 2021-01-04 | End: 2021-04-16 | Stop reason: SDUPTHER

## 2021-01-22 ENCOUNTER — HOSPITAL ENCOUNTER (OUTPATIENT)
Dept: PHARMACY | Age: 81
Setting detail: THERAPIES SERIES
Discharge: HOME | End: 2021-01-22
Payer: MEDICARE

## 2021-01-22 VITALS — TEMPERATURE: 97.3 F

## 2021-01-22 DIAGNOSIS — T81.718D IATROGENIC PULMONARY EMBOLISM, SUBSEQUENT ENCOUNTER (HCC): ICD-10-CM

## 2021-01-22 DIAGNOSIS — I26.99 IATROGENIC PULMONARY EMBOLISM, SUBSEQUENT ENCOUNTER (HCC): ICD-10-CM

## 2021-01-22 LAB — INTERNATIONAL NORMALIZATION RATIO, POC: 2.4

## 2021-01-22 PROCEDURE — 99211 OFF/OP EST MAY X REQ PHY/QHP: CPT | Performed by: PHARMACIST

## 2021-01-22 PROCEDURE — 85610 PROTHROMBIN TIME: CPT | Performed by: PHARMACIST

## 2021-01-22 NOTE — PROGRESS NOTES
Mr. José Miguel Friend is a 80 y.o. y/o male with history of PE who presents today for anticoagulation monitoring and adjustment. INR 2.4 is therapeutic for this patient (goal range 2-3) and is reflective of 35 mg TWD  Patient verifies current dosing regimen, patient able to verbally recall dose  Patient reports no missed doses since last INR   Patient denies s/sx clotting and/or stroke  Patient denies hematuria, epistaxis, rectal bleeding  Patient denies changes in diet, alcohol, or tobacco use  Reviewed medication list and drug allergies with patient, updated any medication additions or modifications accordingly  Patient also denies any pending medical or dental procedures scheduled at this time  Patient was instructed to continue 35 mg TWD and RTC 6 weeks    CLINICAL PHARMACY CONSULT: MED RECONCILIATION/REVIEW [address]: No  Total # of Interventions Recommended: 0  - Maintenance Safety Lab Monitoring #: 1  Total Interventions Accepted: 0  Time Spent (min): 30    MENDEL Javier Ph. 1/22/2021 7:57 AM

## 2021-03-05 ENCOUNTER — HOSPITAL ENCOUNTER (OUTPATIENT)
Dept: PHARMACY | Age: 81
Setting detail: THERAPIES SERIES
Discharge: HOME | End: 2021-03-05
Payer: MEDICARE

## 2021-03-05 VITALS — TEMPERATURE: 97.2 F

## 2021-03-05 DIAGNOSIS — I26.99 IATROGENIC PULMONARY EMBOLISM, SUBSEQUENT ENCOUNTER (HCC): ICD-10-CM

## 2021-03-05 DIAGNOSIS — T81.718D IATROGENIC PULMONARY EMBOLISM, SUBSEQUENT ENCOUNTER (HCC): ICD-10-CM

## 2021-03-05 LAB — INTERNATIONAL NORMALIZATION RATIO, POC: 3.3

## 2021-03-05 PROCEDURE — 85610 PROTHROMBIN TIME: CPT | Performed by: PHARMACIST

## 2021-03-05 PROCEDURE — 99211 OFF/OP EST MAY X REQ PHY/QHP: CPT | Performed by: PHARMACIST

## 2021-04-06 ENCOUNTER — HOSPITAL ENCOUNTER (OUTPATIENT)
Dept: CARDIOLOGY | Age: 81
Discharge: HOME | End: 2021-04-06
Payer: MEDICARE

## 2021-04-06 PROCEDURE — 93296 REM INTERROG EVL PM/IDS: CPT

## 2021-04-16 ENCOUNTER — HOSPITAL ENCOUNTER (OUTPATIENT)
Dept: PHARMACY | Age: 81
Setting detail: THERAPIES SERIES
Discharge: HOME | End: 2021-04-16
Payer: MEDICARE

## 2021-04-16 LAB — INTERNATIONAL NORMALIZATION RATIO, POC: 3.1

## 2021-04-16 PROCEDURE — 85610 PROTHROMBIN TIME: CPT | Performed by: PHARMACIST

## 2021-04-16 PROCEDURE — 99211 OFF/OP EST MAY X REQ PHY/QHP: CPT | Performed by: PHARMACIST

## 2021-04-16 RX ORDER — WARFARIN SODIUM 5 MG/1
TABLET ORAL
Qty: 100 TABLET | Refills: 1 | Status: SHIPPED | OUTPATIENT
Start: 2021-04-16 | End: 2021-08-20 | Stop reason: SDUPTHER

## 2021-04-16 NOTE — PROGRESS NOTES
Mr. Rocky Lopez is a 80 y.o. y/o male with history of PE who presents today for anticoagulation monitoring and adjustment. Face to face visit completed, procedural mask and face shield worn by myself as instructed: Mask worn by patient, room cleaned pre and post visit with Virex Plus spray, patient temperature check with temporal thermometer    INR 3.1 is sl supratherapeutic for this patient (goal range 2-3) and is reflective of 35 mg TWD  Patient verifies current dosing regimen, patient able to verbally recall dose  Patient reports no missed doses in the last seven days   Patient denies s/sx clotting and/or stroke  Patient denies hematuria, epistaxis, rectal bleeding  Patient denies changes in diet, alcohol, or tobacco use  Reviewed medication list and drug allergies with patient, updated any medication additions or modifications accordingly  Patient also denies any pending medical or dental procedures scheduled at this time  Patient was instructed to continue 35mg TWD and RTC 6 weeks    CLINICAL PHARMACY CONSULT: MED RECONCILIATION/REVIEW [address] Only    PHSO: No  Total # of Interventions Recommended: 0    - Maintenance Safety Lab Monitoring #: 1  Total Interventions Accepted: 0  Time Spent (min): 30    CAIN Javier. Ph. 4/16/2021 8:28 AM

## 2021-05-28 ENCOUNTER — HOSPITAL ENCOUNTER (OUTPATIENT)
Dept: PHARMACY | Age: 81
Setting detail: THERAPIES SERIES
Discharge: HOME | End: 2021-05-28
Payer: MEDICARE

## 2021-05-28 DIAGNOSIS — I26.99 IATROGENIC PULMONARY EMBOLISM, INITIAL ENCOUNTER (HCC): Primary | ICD-10-CM

## 2021-05-28 DIAGNOSIS — T81.718A IATROGENIC PULMONARY EMBOLISM, INITIAL ENCOUNTER (HCC): Primary | ICD-10-CM

## 2021-05-28 LAB — INTERNATIONAL NORMALIZATION RATIO, POC: 2.4

## 2021-05-28 PROCEDURE — 99211 OFF/OP EST MAY X REQ PHY/QHP: CPT

## 2021-05-28 PROCEDURE — 85610 PROTHROMBIN TIME: CPT

## 2021-05-28 NOTE — PROGRESS NOTES
Mr. Silvana Schmidt is a 80 y.o. y/o male with history of PE who presents today for anticoagulation monitoring and adjustment.   INR 2.4 is therapeutic for this patient (goal range 2-3) and is reflective of 35 mg TWD  Patient verifies current dosing regimen, patient able to verbally recall dose  Patient reports 0  missed doses since last INR   Patient denies s/sx clotting and/or stroke  Patient denies hematuria, epistaxis, rectal bleeding  Patient denies changes in diet, alcohol, or tobacco use  Reviewed medication list and drug allergies with patient, updated any medication additions or modifications accordingly  Patient also denies any pending medical or dental procedures scheduled at this time  Patient was instructed to continue 35 mg TWD and RTC 6 weeks    For Pharmacy Admin Tracking Only     Intervention Detail:    Total # of Interventions Recommended: 0   Total # of Interventions Accepted: 0   Time Spent (min): 15

## 2021-07-09 ENCOUNTER — HOSPITAL ENCOUNTER (OUTPATIENT)
Dept: PHARMACY | Age: 81
Setting detail: THERAPIES SERIES
Discharge: HOME | End: 2021-07-09
Payer: MEDICARE

## 2021-07-09 DIAGNOSIS — I26.99 IATROGENIC PULMONARY EMBOLISM, SUBSEQUENT ENCOUNTER (HCC): Primary | ICD-10-CM

## 2021-07-09 DIAGNOSIS — T81.718D IATROGENIC PULMONARY EMBOLISM, SUBSEQUENT ENCOUNTER (HCC): Primary | ICD-10-CM

## 2021-07-09 LAB — INTERNATIONAL NORMALIZATION RATIO, POC: 3.2

## 2021-07-09 PROCEDURE — 99211 OFF/OP EST MAY X REQ PHY/QHP: CPT | Performed by: PHARMACIST

## 2021-07-09 PROCEDURE — 85610 PROTHROMBIN TIME: CPT | Performed by: PHARMACIST

## 2021-07-09 NOTE — PROGRESS NOTES
Mr. Regina Atkinson is a 80 y.o. y/o male with history of PE who presents today for anticoagulation monitoring and adjustment. INR 3.2 is supratherapeutic for this patient (goal range 2-3) and is reflective of 35 mg TWD  Patient verifies current dosing regimen, patient able to verbally recall dose  Patient reports no  missed doses since last INR   Patient denies s/sx clotting and/or stroke  Patient denies hematuria, epistaxis, rectal bleeding  Patient denies changes in diet, alcohol, or tobacco use  Reviewed medication list and drug allergies with patient, updated any medication additions or modifications accordingly  Patient also denies any pending medical or dental procedures scheduled at this time. Pt had extra dose about a week ago in error, likely resulted in supratx INR.    Patient was instructed to continue 35mg TWD and RTC 6 weeks     For Pharmacy Admin Tracking Only     Intervention Detail: Adherence Monitorin   Total # of Interventions Recommended: 2   Total # of Interventions Accepted: 2   Time Spent (min): 30

## 2021-07-21 ENCOUNTER — HOSPITAL ENCOUNTER (OUTPATIENT)
Dept: CARDIOLOGY | Age: 81
Discharge: HOME | End: 2021-07-21
Payer: MEDICARE

## 2021-07-21 PROCEDURE — 93296 REM INTERROG EVL PM/IDS: CPT

## 2021-08-20 ENCOUNTER — HOSPITAL ENCOUNTER (OUTPATIENT)
Dept: PHARMACY | Age: 81
Setting detail: THERAPIES SERIES
Discharge: HOME | End: 2021-08-20
Payer: MEDICARE

## 2021-08-20 DIAGNOSIS — I26.99: Primary | ICD-10-CM

## 2021-08-20 DIAGNOSIS — T81.718S: Primary | ICD-10-CM

## 2021-08-20 LAB — INTERNATIONAL NORMALIZATION RATIO, POC: 2.6

## 2021-08-20 PROCEDURE — 85610 PROTHROMBIN TIME: CPT

## 2021-08-20 PROCEDURE — 99211 OFF/OP EST MAY X REQ PHY/QHP: CPT

## 2021-08-20 RX ORDER — WARFARIN SODIUM 5 MG/1
TABLET ORAL
Qty: 100 TABLET | Refills: 1 | Status: SHIPPED | OUTPATIENT
Start: 2021-08-20 | End: 2021-12-20 | Stop reason: SDUPTHER

## 2021-08-20 NOTE — PROGRESS NOTES
Mr. Denise Mims is a 80 y.o. y/o male with history of PE who presents today for anticoagulation monitoring and adjustment. Face to face visit completed, procedural mask worn by myself as instructed; procedural mask worn by patient, room cleaned pre and post visit with Virex Plus spray and/anti-viral wipe.      INR 2.6 is therapeutic for this patient (goal range 2-3) and is reflective of 35 mg TWD  Patient verifies current dosing regimen, patient able to verbally recall dose  Patient reports 0  missed doses since last INR   Patient denies s/sx clotting and/or stroke  Patient denies hematuria, epistaxis, rectal bleeding  Patient has changes in diet, alcohol, or tobacco use - patient stated he has cut back on his smoothies > 1 week ago (contained celery and emerson lettuce)   Reviewed medication list and drug allergies with patient, updated any medication additions or modifications accordingly  Patient also denies any pending medical or dental procedures scheduled at this time  Patient was instructed to continue with warfarin 35mg TWD and RTC 6 weeks    For Pharmacy Admin Tracking Only     Intervention Detail: Adherence Monitorin and Lab(s) Ordered   Total # of Interventions Recommended: 2   Total # of Interventions Accepted: 2   Time Spent (min): Daniel Johnson, PharmD   2021 7:58 AM

## 2021-10-01 ENCOUNTER — HOSPITAL ENCOUNTER (OUTPATIENT)
Dept: PHARMACY | Age: 81
Setting detail: THERAPIES SERIES
Discharge: HOME | End: 2021-10-01
Payer: MEDICARE

## 2021-10-01 DIAGNOSIS — I26.99 IATROGENIC PULMONARY EMBOLISM, SUBSEQUENT ENCOUNTER (HCC): Primary | ICD-10-CM

## 2021-10-01 DIAGNOSIS — T81.718D IATROGENIC PULMONARY EMBOLISM, SUBSEQUENT ENCOUNTER (HCC): Primary | ICD-10-CM

## 2021-10-01 LAB — INTERNATIONAL NORMALIZATION RATIO, POC: 2.2

## 2021-10-01 PROCEDURE — 99211 OFF/OP EST MAY X REQ PHY/QHP: CPT

## 2021-10-01 PROCEDURE — 85610 PROTHROMBIN TIME: CPT

## 2021-10-01 NOTE — PROGRESS NOTES
Mr. Chepe Justice is a 80 y.o. y/o male with history of PE who presents today for anticoagulation monitoring and adjustment. Face to face visit completed, procedural mask worn by myself as instructed; procedural mask worn by patient, room cleaned pre and post visit with Virex Plus spray and/anti-viral wipe. INR 2.2 is therapeutic for this patient (goal range 2-3) and is reflective of 35 mg TWD  Patient verifies current dosing regimen, patient able to verbally recall dose  Patient reports 0 missed doses since last INR   Patient denies s/sx clotting and/or stroke  Patient denies hematuria, epistaxis, rectal bleeding  Patient denies changes in diet, alcohol, or tobacco use  Reviewed medication list and drug allergies with patient, updated any medication additions or modifications accordingly - patient stated he is taking supplements for dietary and blood sugar, brought in bottles, neither contain Vitamin K, ok to take. Also, patient plans to get flu shot and recently got shingles shot.    Patient also denies any pending medical or dental procedures scheduled at this time  Patient was instructed to continue with current therapy of warfarin 35mg TWD and RTC 6 weeks    For Pharmacy Admin Tracking Only     Intervention Detail: Adherence Monitorin and Lab(s) Ordered   Total # of Interventions Recommended: 2   Total # of Interventions Accepted: 2   Time Spent (min): Daniel Beltran PharmD   10/1/2021 7:56 AM

## 2021-10-21 ENCOUNTER — HOSPITAL ENCOUNTER (OUTPATIENT)
Dept: CARDIOLOGY | Age: 81
Discharge: HOME | End: 2021-10-21
Payer: MEDICARE

## 2021-10-21 PROCEDURE — 93280 PM DEVICE PROGR EVAL DUAL: CPT

## 2021-11-12 ENCOUNTER — HOSPITAL ENCOUNTER (OUTPATIENT)
Dept: PHARMACY | Age: 81
Setting detail: THERAPIES SERIES
Discharge: HOME | End: 2021-11-12
Payer: MEDICARE

## 2021-11-12 DIAGNOSIS — I26.99 IATROGENIC PULMONARY EMBOLISM, SUBSEQUENT ENCOUNTER (HCC): Primary | ICD-10-CM

## 2021-11-12 DIAGNOSIS — T81.718D IATROGENIC PULMONARY EMBOLISM, SUBSEQUENT ENCOUNTER (HCC): Primary | ICD-10-CM

## 2021-11-12 LAB — INTERNATIONAL NORMALIZATION RATIO, POC: 2.5

## 2021-11-12 PROCEDURE — 99211 OFF/OP EST MAY X REQ PHY/QHP: CPT

## 2021-11-12 PROCEDURE — 85610 PROTHROMBIN TIME: CPT

## 2021-11-12 NOTE — PROGRESS NOTES
Mr. Rell Carlson is a 80 y.o. y/o male with history of PE who presents today for anticoagulation monitoring and adjustment.   INR 2.5 is therapeutic for this patient (goal range 2-3) and is reflective of 35 mg TWD  Patient verifies current dosing regimen, patient able to verbally recall dose  Patient reports no  missed doses since last INR   Patient denies s/sx clotting and/or stroke  Patient denies hematuria, epistaxis, rectal bleeding  Patient denies changes in diet, alcohol, or tobacco use  Reviewed medication list and drug allergies with patient, updated any medication additions or modifications accordingly  Patient also denies any pending medical or dental procedures scheduled at this time  Patient was instructed to continue current regimen of 35 mg TWD and RTC 6 weeks    For Pharmacy Admin Tracking Only     Intervention Detail: Lab(s) Ordered   Total # of Interventions Recommended: 1   Total # of Interventions Accepted: 1   Time Spent (min): 30    Ashley GoldsmithD, [facility]  Staff Pharmacist  11/12/2021 8:16 AM

## 2021-12-17 NOTE — PROGRESS NOTES
Mr. Kiel Cantor is a 80 y.o. y/o male with history of PE who presents today for anticoagulation monitoring and adjustment. Face to face visit completed, procedural mask and face shield worn by myself as instructed: Mask worn by patient, room cleaned pre and post visit with Virex Plus spray, patient temperature check with temporal thermometer    INR 3.3 is sl supratherapeutic for this patient (goal range 2-3) and is reflective of 35 mg TWD  Patient verifies current dosing regimen, patient able to verbally recall dose  Patient reports no missed doses in the last seven days   Patient denies s/sx clotting and/or stroke  Patient denies hematuria, epistaxis, rectal bleeding  Patient denies changes in diet, alcohol, or tobacco use  Patient states he may not have been eating as many Vit K containing foods in the last few weeks. Reviewed medication list and drug allergies with patient, updated any medication additions or modifications accordingly  Patient also denies any pending medical or dental procedures scheduled at this time  Patient has been very stable on 35mg TWD  Patient was instructed to take 2.5mg instead of 5mg today then continue 35mg TWD and RTC 6 weeks    CLINICAL PHARMACY CONSULT: MED RECONCILIATION/REVIEW [address]: No  Total # of Interventions Recommended: 1  - Decreased Dose #: 1  - Maintenance Safety Lab Monitoring #: 1  Total Interventions Accepted: 1  Time Spent (min): 30    CAIN Javier. Ph. 3/5/2021 8:20 AM
72

## 2021-12-20 RX ORDER — WARFARIN SODIUM 5 MG/1
TABLET ORAL
Qty: 100 TABLET | Refills: 1 | Status: SHIPPED | OUTPATIENT
Start: 2021-12-20 | End: 2022-10-14 | Stop reason: SDUPTHER

## 2021-12-23 ENCOUNTER — HOSPITAL ENCOUNTER (OUTPATIENT)
Dept: PHARMACY | Age: 81
Setting detail: THERAPIES SERIES
Discharge: HOME | End: 2021-12-23
Payer: MEDICARE

## 2021-12-23 DIAGNOSIS — T81.718D IATROGENIC PULMONARY EMBOLISM, SUBSEQUENT ENCOUNTER (HCC): Primary | ICD-10-CM

## 2021-12-23 DIAGNOSIS — I26.99 IATROGENIC PULMONARY EMBOLISM, SUBSEQUENT ENCOUNTER (HCC): Primary | ICD-10-CM

## 2021-12-23 LAB — INTERNATIONAL NORMALIZATION RATIO, POC: 2.8

## 2021-12-23 PROCEDURE — 99211 OFF/OP EST MAY X REQ PHY/QHP: CPT

## 2021-12-23 PROCEDURE — 85610 PROTHROMBIN TIME: CPT

## 2021-12-23 NOTE — PROGRESS NOTES
Mr. Mau Tapia is a 80 y.o. y/o male with history of PE who presents today for anticoagulation monitoring and adjustment.   INR 2.8 is therapeutic for this patient (goal range 2-3) and is reflective of 35 mg TWD  Patient verifies current dosing regimen, patient able to verbally recall dose  Patient reports 0  missed doses since last INR   Patient denies s/sx clotting and/or stroke  Patient denies hematuria, epistaxis, rectal bleeding  Patient denies changes in diet, alcohol, or tobacco use  Reviewed medication list and drug allergies with patient, updated any medication additions or modifications accordingly  Patient also denies any pending medical or dental procedures scheduled at this time  Patient was instructed to continue 35 mg TWD and RTC 6 weeks      For Pharmacy Admin Tracking Only     Intervention Detail:    Total # of Interventions Recommended: 0   Total # of Interventions Accepted: 0   Time Spent (min): 15

## 2022-01-20 ENCOUNTER — HOSPITAL ENCOUNTER (OUTPATIENT)
Dept: CARDIOLOGY | Age: 82
Discharge: HOME | End: 2022-01-20
Payer: MEDICARE

## 2022-01-20 PROCEDURE — 93280 PM DEVICE PROGR EVAL DUAL: CPT

## 2022-02-04 ENCOUNTER — APPOINTMENT (OUTPATIENT)
Dept: PHARMACY | Age: 82
End: 2022-02-04
Payer: MEDICARE

## 2022-02-04 ENCOUNTER — TELEPHONE (OUTPATIENT)
Dept: PHARMACY | Age: 82
End: 2022-02-04

## 2022-02-08 ENCOUNTER — HOSPITAL ENCOUNTER (OUTPATIENT)
Dept: PHARMACY | Age: 82
Setting detail: THERAPIES SERIES
Discharge: HOME | End: 2022-02-08
Payer: MEDICARE

## 2022-02-08 DIAGNOSIS — T81.718A IATROGENIC PULMONARY EMBOLISM, INITIAL ENCOUNTER (HCC): Primary | ICD-10-CM

## 2022-02-08 DIAGNOSIS — I26.99 IATROGENIC PULMONARY EMBOLISM, INITIAL ENCOUNTER (HCC): Primary | ICD-10-CM

## 2022-02-08 LAB — INTERNATIONAL NORMALIZATION RATIO, POC: 2

## 2022-02-08 PROCEDURE — 99211 OFF/OP EST MAY X REQ PHY/QHP: CPT

## 2022-02-08 PROCEDURE — 85610 PROTHROMBIN TIME: CPT

## 2022-02-08 NOTE — PROGRESS NOTES
Mr. Mary Hinton is a 80 y.o. y/o male with history of PE, (iatrogenic)  who presents today for anticoagulation monitoring and adjustment. Face to face visit completed, procedural mask worn by myself as instructed; procedural mask worn by patient, room cleaned pre and post visit with Virex Plus spray and/anti-viral wipe. INR 2 is therapeutic for this patient (goal range 2-3) and is reflective of 35 mg TWD.    Patient verifies current dosing regimen, patient able to verbally recall dose  Patient reports 0 missed doses since last INR   Patient denies s/sx clotting and/or stroke  Patient denies hematuria, epistaxis, rectal bleeding  Patient denies changes in diet, alcohol, or tobacco use  Reviewed medication list and drug allergies with patient, updated any medication additions or modifications accordingly  Patient also denies any pending medical or dental procedures scheduled at this time  Patient was instructed to continue with current therapy of warfarin 35mg TWD and RTC 6 weeks    For Pharmacy Admin Tracking Only     Intervention Detail: Adherence Monitorin and Lab(s) Ordered   Total # of Interventions Recommended: 2   Total # of Interventions Accepted: 2   Time Spent (min): Daniel Partida, PharmD, BCPS   2022 7:55 AM

## 2022-03-25 ENCOUNTER — HOSPITAL ENCOUNTER (OUTPATIENT)
Dept: PHARMACY | Age: 82
Setting detail: THERAPIES SERIES
Discharge: HOME | End: 2022-03-25
Payer: MEDICARE

## 2022-03-25 DIAGNOSIS — T81.718D IATROGENIC PULMONARY EMBOLISM, SUBSEQUENT ENCOUNTER (HCC): Primary | ICD-10-CM

## 2022-03-25 DIAGNOSIS — I26.99 IATROGENIC PULMONARY EMBOLISM, SUBSEQUENT ENCOUNTER (HCC): Primary | ICD-10-CM

## 2022-03-25 LAB — INTERNATIONAL NORMALIZATION RATIO, POC: 3.6

## 2022-03-25 PROCEDURE — 85610 PROTHROMBIN TIME: CPT | Performed by: PHARMACIST

## 2022-03-25 PROCEDURE — 99211 OFF/OP EST MAY X REQ PHY/QHP: CPT | Performed by: PHARMACIST

## 2022-04-15 ENCOUNTER — TELEPHONE (OUTPATIENT)
Dept: PHARMACY | Age: 82
End: 2022-04-15

## 2022-04-15 ENCOUNTER — HOSPITAL ENCOUNTER (OUTPATIENT)
Dept: PHARMACY | Age: 82
Setting detail: THERAPIES SERIES
Discharge: HOME | End: 2022-04-15
Payer: MEDICARE

## 2022-04-15 DIAGNOSIS — I26.99 IATROGENIC PULMONARY EMBOLISM, SUBSEQUENT ENCOUNTER (HCC): Primary | ICD-10-CM

## 2022-04-15 DIAGNOSIS — T81.718D IATROGENIC PULMONARY EMBOLISM, SUBSEQUENT ENCOUNTER (HCC): Primary | ICD-10-CM

## 2022-04-15 LAB — INTERNATIONAL NORMALIZATION RATIO, POC: 2.5

## 2022-04-15 PROCEDURE — 99211 OFF/OP EST MAY X REQ PHY/QHP: CPT | Performed by: PHARMACIST

## 2022-04-15 PROCEDURE — 85610 PROTHROMBIN TIME: CPT | Performed by: PHARMACIST

## 2022-04-15 NOTE — PROGRESS NOTES
Mr. Jacqueline Damon is a 80 y.o. y/o male with history of PE who presents today for anticoagulation monitoring and adjustment. INR  2.5   is therapeutic for this patient (goal range 2-3) and is reflective of 32.5 mg TWD  Patient verifies current dosing regimen, patient able to verbally recall dose  Patient reports no  missed doses since last INR   Patient denies s/sx clotting and/or stroke  Patient denies hematuria, epistaxis, rectal bleeding  Patient denies changes in diet, alcohol, or tobacco use  Reviewed medication list and drug allergies with patient, updated any medication additions or modifications accordingly  Patient also denies any pending medical or dental procedures scheduled at this time. The patient started Augmentin 2 days ago (may increase INR), ordered x5 days only- cellulitis. However, the patient was instructed by his doctor to go to ER today since leg not improving. Advised patient to call (if possible) with update after ER visit.   Patient was instructed to continue 32.5mg TWD and RTC 2 weeks        For Pharmacy Admin Tracking Only     Intervention Detail: Adherence Monitorin   Total # of Interventions Recommended: 2   Total # of Interventions Accepted: 2   Time Spent (min): 30

## 2022-04-15 NOTE — TELEPHONE ENCOUNTER
The patient called, stated he was admit to [facility] today after ER visit. Patient says he will start vancomycin IV, stopping Augmentin  and \"will be here overnight\". Asked patient to call with update next week, otherwise continue plan from visit w/ [facility] earlier today (unless chgd by [facility]). Also note- pharmacist at [facility] - KAREN Padilla) called now to verify pt's warfarin dose.

## 2022-04-18 ENCOUNTER — HOSPITAL ENCOUNTER (OUTPATIENT)
Dept: CARDIOLOGY | Age: 82
Discharge: HOME | End: 2022-04-18
Payer: MEDICARE

## 2022-04-18 PROCEDURE — 93296 REM INTERROG EVL PM/IDS: CPT

## 2022-04-29 ENCOUNTER — HOSPITAL ENCOUNTER (OUTPATIENT)
Dept: PHARMACY | Age: 82
Setting detail: THERAPIES SERIES
Discharge: HOME | End: 2022-04-29
Payer: MEDICARE

## 2022-04-29 DIAGNOSIS — I26.99: Primary | ICD-10-CM

## 2022-04-29 DIAGNOSIS — T81.718S: Primary | ICD-10-CM

## 2022-04-29 LAB — INTERNATIONAL NORMALIZATION RATIO, POC: 3.1

## 2022-04-29 PROCEDURE — 99211 OFF/OP EST MAY X REQ PHY/QHP: CPT | Performed by: PHARMACIST

## 2022-04-29 PROCEDURE — 85610 PROTHROMBIN TIME: CPT | Performed by: PHARMACIST

## 2022-04-29 NOTE — PROGRESS NOTES
Mr. Claire Rowland is a 80 y.o. male with history of PE who presents today for anticoagulation monitoring and adjustment. Face to face visit completed, procedural mask and face shield worn by myself as instructed: Mask worn by patient, room cleaned pre and post visit with Virex Plus spray    INR 3.1 is sl supratherapeutic for this patient (goal range 2-3) and is reflective of 32.5 mg TWD  Patient verifies current dosing regimen, patient able to verbally recall dose  Patient reports no missed doses in the last seven days     Patient denies s/sx clotting and/or stroke  Patient denies hematuria, epistaxis, rectal bleeding  Patient denies changes in diet  Patient denies changes in alcohol use  Patient denies changes in tobacco use    Reviewed medication list and drug allergies with patient, updated any medication additions or modifications accordingly  Patient is being treated for cellulitis of his leg and is currently on Bactrim which he started 4 days ago. Patient denies any pending medical procedures scheduled at this time  Patient denies any pending dental procedures scheduled at this time    Patient was instructed to continue 32.5mg TWD and RTC in 2 weeks. For Pharmacy Admin Tracking Only     Intervention Detail: Adherence Monitorin   Total # of Interventions Recommended: 1   Total # of Interventions Accepted: 1   Time Spent (min): 30      Black Johnson, CAIN. Ph. 2022 8:27 AM

## 2022-05-13 ENCOUNTER — HOSPITAL ENCOUNTER (OUTPATIENT)
Dept: PHARMACY | Age: 82
Setting detail: THERAPIES SERIES
Discharge: HOME | End: 2022-05-13
Payer: MEDICARE

## 2022-05-13 DIAGNOSIS — T81.718A IATROGENIC PULMONARY EMBOLISM, INITIAL ENCOUNTER (HCC): Primary | ICD-10-CM

## 2022-05-13 DIAGNOSIS — I26.99 IATROGENIC PULMONARY EMBOLISM, INITIAL ENCOUNTER (HCC): Primary | ICD-10-CM

## 2022-05-13 LAB — INTERNATIONAL NORMALIZATION RATIO, POC: 3.6

## 2022-05-13 PROCEDURE — 85610 PROTHROMBIN TIME: CPT

## 2022-05-13 PROCEDURE — 99211 OFF/OP EST MAY X REQ PHY/QHP: CPT

## 2022-05-13 NOTE — PROGRESS NOTES
Mr. Julius Nina is a 80 y.o. y/o male with history of PE who presents today for anticoagulation monitoring and adjustment. INR 3.6 is supratherapeutic for this patient (goal range 2.0-3.0) and is reflective of 32.5 mg TWD  Patient verifies current dosing regimen, patient able to verbally recall dose  Patient reports 0  missed doses since last INR   Patient denies s/sx clotting and/or stroke  Patient denies hematuria, epistaxis, rectal bleeding  Patient denies changes in diet, alcohol, or tobacco use  Reviewed medication list and drug allergies with patient, updated any medication additions or modifications accordingly  Patient also denies any pending medical or dental procedures scheduled at this time  Prior to 2022, patient was stable on regimen and was seen every 6 weeks. Since, has been running supra-therapeutic. Last visit was 3.1 two weeks ago. Supra-therapeutic again today at 3.6.  Patient was instructed to hold dose today and reduce Monday to 2.5mg (~8% TWD reduction) and RTC in 2 weeks    For Pharmacy Admin Tracking Only     Intervention Detail: Adherence Monitorin, Dose Adjustment: 1, reason: Therapy Optimization and Lab(s) Ordered   Total # of Interventions Recommended: 2   Total # of Interventions Accepted: 2   Time Spent (min): 15      Curtis Pool PharmD   2022 8:06 AM

## 2022-05-23 ENCOUNTER — HOSPITAL ENCOUNTER (OUTPATIENT)
Age: 82
Setting detail: OBSERVATION
Discharge: HOME | End: 2022-05-25
Attending: STUDENT IN AN ORGANIZED HEALTH CARE EDUCATION/TRAINING PROGRAM | Admitting: INTERNAL MEDICINE
Payer: MEDICARE

## 2022-05-23 DIAGNOSIS — R06.02 SHORTNESS OF BREATH: Primary | ICD-10-CM

## 2022-05-23 PROCEDURE — 99285 EMERGENCY DEPT VISIT HI MDM: CPT

## 2022-05-23 NOTE — Clinical Note
Discharge Plan[de-identified] Other/[facility])   Telemetry/Cardiac Monitoring Required?: Yes

## 2022-05-24 ENCOUNTER — APPOINTMENT (OUTPATIENT)
Dept: GENERAL RADIOLOGY | Age: 82
End: 2022-05-24
Payer: MEDICARE

## 2022-05-24 ENCOUNTER — APPOINTMENT (OUTPATIENT)
Dept: CT IMAGING | Age: 82
End: 2022-05-24
Payer: MEDICARE

## 2022-05-24 PROBLEM — R06.09 DYSPNEA ON EXERTION: Status: ACTIVE | Noted: 2022-05-24

## 2022-05-24 PROBLEM — J44.1 COPD EXACERBATION (HCC): Status: ACTIVE | Noted: 2022-05-24

## 2022-05-24 PROBLEM — R07.9 CHEST PAIN: Status: ACTIVE | Noted: 2022-05-24

## 2022-05-24 LAB
ALBUMIN SERPL-MCNC: 4.1 G/DL (ref 3.5–4.6)
ALP BLD-CCNC: 55 U/L (ref 35–104)
ALT SERPL-CCNC: 16 U/L (ref 0–41)
ANION GAP SERPL CALCULATED.3IONS-SCNC: 10 MEQ/L (ref 9–15)
AST SERPL-CCNC: 18 U/L (ref 0–40)
BASOPHILS ABSOLUTE: 0 K/UL (ref 0–0.2)
BASOPHILS RELATIVE PERCENT: 0.5 %
BILIRUB SERPL-MCNC: 0.8 MG/DL (ref 0.2–0.7)
BUN BLDV-MCNC: 14 MG/DL (ref 8–23)
CALCIUM SERPL-MCNC: 9.2 MG/DL (ref 8.5–9.9)
CHLORIDE BLD-SCNC: 101 MEQ/L (ref 95–107)
CO2: 26 MEQ/L (ref 20–31)
CREAT SERPL-MCNC: 0.86 MG/DL (ref 0.7–1.2)
EKG ATRIAL RATE: 326 BPM
EKG ATRIAL RATE: 72 BPM
EKG Q-T INTERVAL: 430 MS
EKG Q-T INTERVAL: 448 MS
EKG QRS DURATION: 160 MS
EKG QRS DURATION: 160 MS
EKG QTC CALCULATION (BAZETT): 477 MS
EKG QTC CALCULATION (BAZETT): 486 MS
EKG R AXIS: -74 DEGREES
EKG R AXIS: -80 DEGREES
EKG T AXIS: 91 DEGREES
EKG T AXIS: 94 DEGREES
EKG VENTRICULAR RATE: 71 BPM
EKG VENTRICULAR RATE: 74 BPM
EOSINOPHILS ABSOLUTE: 0.2 K/UL (ref 0–0.7)
EOSINOPHILS RELATIVE PERCENT: 2 %
GFR AFRICAN AMERICAN: >60
GFR NON-AFRICAN AMERICAN: >60
GLOBULIN: 2.8 G/DL (ref 2.3–3.5)
GLUCOSE BLD-MCNC: 121 MG/DL (ref 70–99)
HBA1C MFR BLD: 5.9 % (ref 4.8–5.9)
HCT VFR BLD CALC: 39.7 % (ref 42–52)
HEMOGLOBIN: 13.3 G/DL (ref 14–18)
INFLUENZA A BY PCR: NEGATIVE
INFLUENZA B BY PCR: NEGATIVE
INR BLD: 1.9
LACTIC ACID: 0.9 MMOL/L (ref 0.5–2.2)
LV EF: 48 %
LVEF MODALITY: NORMAL
LYMPHOCYTES ABSOLUTE: 0.9 K/UL (ref 1–4.8)
LYMPHOCYTES RELATIVE PERCENT: 10.4 %
MAGNESIUM: 2.1 MG/DL (ref 1.7–2.4)
MCH RBC QN AUTO: 29.3 PG (ref 27–31.3)
MCHC RBC AUTO-ENTMCNC: 33.6 % (ref 33–37)
MCV RBC AUTO: 87.3 FL (ref 80–100)
MONOCYTES ABSOLUTE: 0.8 K/UL (ref 0.2–0.8)
MONOCYTES RELATIVE PERCENT: 9.2 %
NEUTROPHILS ABSOLUTE: 6.8 K/UL (ref 1.4–6.5)
NEUTROPHILS RELATIVE PERCENT: 77.9 %
PDW BLD-RTO: 14.1 % (ref 11.5–14.5)
PLATELET # BLD: 176 K/UL (ref 130–400)
POTASSIUM SERPL-SCNC: 4.1 MEQ/L (ref 3.4–4.9)
PRO-BNP: 3277 PG/ML
PROCALCITONIN: 0.03 NG/ML (ref 0–0.15)
PROTHROMBIN TIME: 21.8 SEC (ref 12.3–14.9)
RBC # BLD: 4.55 M/UL (ref 4.7–6.1)
SARS-COV-2, NAAT: NOT DETECTED
SODIUM BLD-SCNC: 137 MEQ/L (ref 135–144)
TOTAL CK: 71 U/L (ref 0–190)
TOTAL PROTEIN: 6.9 G/DL (ref 6.3–8)
TROPONIN: <0.01 NG/ML (ref 0–0.01)
WBC # BLD: 8.7 K/UL (ref 4.8–10.8)

## 2022-05-24 PROCEDURE — 84484 ASSAY OF TROPONIN QUANT: CPT

## 2022-05-24 PROCEDURE — 36415 COLL VENOUS BLD VENIPUNCTURE: CPT

## 2022-05-24 PROCEDURE — 94150 VITAL CAPACITY TEST: CPT

## 2022-05-24 PROCEDURE — 71275 CT ANGIOGRAPHY CHEST: CPT

## 2022-05-24 PROCEDURE — 6360000004 HC RX CONTRAST MEDICATION: Performed by: STUDENT IN AN ORGANIZED HEALTH CARE EDUCATION/TRAINING PROGRAM

## 2022-05-24 PROCEDURE — G0378 HOSPITAL OBSERVATION PER HR: HCPCS

## 2022-05-24 PROCEDURE — 93280 PM DEVICE PROGR EVAL DUAL: CPT

## 2022-05-24 PROCEDURE — 83036 HEMOGLOBIN GLYCOSYLATED A1C: CPT

## 2022-05-24 PROCEDURE — 85025 COMPLETE CBC W/AUTO DIFF WBC: CPT

## 2022-05-24 PROCEDURE — 93005 ELECTROCARDIOGRAM TRACING: CPT | Performed by: STUDENT IN AN ORGANIZED HEALTH CARE EDUCATION/TRAINING PROGRAM

## 2022-05-24 PROCEDURE — 94667 MNPJ CHEST WALL 1ST: CPT

## 2022-05-24 PROCEDURE — 6360000002 HC RX W HCPCS: Performed by: INTERNAL MEDICINE

## 2022-05-24 PROCEDURE — 87502 INFLUENZA DNA AMP PROBE: CPT

## 2022-05-24 PROCEDURE — 84145 PROCALCITONIN (PCT): CPT

## 2022-05-24 PROCEDURE — 87635 SARS-COV-2 COVID-19 AMP PRB: CPT

## 2022-05-24 PROCEDURE — 82550 ASSAY OF CK (CPK): CPT

## 2022-05-24 PROCEDURE — 83735 ASSAY OF MAGNESIUM: CPT

## 2022-05-24 PROCEDURE — 93306 TTE W/DOPPLER COMPLETE: CPT

## 2022-05-24 PROCEDURE — 85610 PROTHROMBIN TIME: CPT

## 2022-05-24 PROCEDURE — 6370000000 HC RX 637 (ALT 250 FOR IP): Performed by: INTERNAL MEDICINE

## 2022-05-24 PROCEDURE — 71045 X-RAY EXAM CHEST 1 VIEW: CPT

## 2022-05-24 PROCEDURE — 94761 N-INVAS EAR/PLS OXIMETRY MLT: CPT

## 2022-05-24 PROCEDURE — 94640 AIRWAY INHALATION TREATMENT: CPT

## 2022-05-24 PROCEDURE — 83880 ASSAY OF NATRIURETIC PEPTIDE: CPT

## 2022-05-24 PROCEDURE — 83605 ASSAY OF LACTIC ACID: CPT

## 2022-05-24 PROCEDURE — 93005 ELECTROCARDIOGRAM TRACING: CPT | Performed by: INTERNAL MEDICINE

## 2022-05-24 PROCEDURE — 96374 THER/PROPH/DIAG INJ IV PUSH: CPT

## 2022-05-24 PROCEDURE — 80053 COMPREHEN METABOLIC PANEL: CPT

## 2022-05-24 RX ORDER — TORSEMIDE 20 MG/1
20 TABLET ORAL DAILY
Status: DISCONTINUED | OUTPATIENT
Start: 2022-05-24 | End: 2022-05-25 | Stop reason: HOSPADM

## 2022-05-24 RX ORDER — IPRATROPIUM BROMIDE AND ALBUTEROL SULFATE 2.5; .5 MG/3ML; MG/3ML
1 SOLUTION RESPIRATORY (INHALATION) ONCE
Status: COMPLETED | OUTPATIENT
Start: 2022-05-24 | End: 2022-05-24

## 2022-05-24 RX ORDER — LANOLIN ALCOHOL/MO/W.PET/CERES
400 CREAM (GRAM) TOPICAL DAILY
Status: DISCONTINUED | OUTPATIENT
Start: 2022-05-24 | End: 2022-05-25 | Stop reason: HOSPADM

## 2022-05-24 RX ORDER — ROSUVASTATIN CALCIUM 20 MG/1
20 TABLET, COATED ORAL NIGHTLY
Status: DISCONTINUED | OUTPATIENT
Start: 2022-05-24 | End: 2022-05-25 | Stop reason: HOSPADM

## 2022-05-24 RX ORDER — ONDANSETRON 4 MG/1
4 TABLET, ORALLY DISINTEGRATING ORAL EVERY 8 HOURS PRN
Status: DISCONTINUED | OUTPATIENT
Start: 2022-05-24 | End: 2022-05-25 | Stop reason: HOSPADM

## 2022-05-24 RX ORDER — FUROSEMIDE 10 MG/ML
40 INJECTION INTRAMUSCULAR; INTRAVENOUS ONCE
Status: COMPLETED | OUTPATIENT
Start: 2022-05-24 | End: 2022-05-24

## 2022-05-24 RX ORDER — TAMSULOSIN HYDROCHLORIDE 0.4 MG/1
0.4 CAPSULE ORAL DAILY
Status: DISCONTINUED | OUTPATIENT
Start: 2022-05-24 | End: 2022-05-25 | Stop reason: HOSPADM

## 2022-05-24 RX ORDER — NITROGLYCERIN 0.4 MG/1
0.4 TABLET SUBLINGUAL EVERY 5 MIN PRN
Status: DISCONTINUED | OUTPATIENT
Start: 2022-05-24 | End: 2022-05-25 | Stop reason: HOSPADM

## 2022-05-24 RX ORDER — LISINOPRIL 20 MG/1
20 TABLET ORAL 2 TIMES DAILY
Status: DISCONTINUED | OUTPATIENT
Start: 2022-05-24 | End: 2022-05-24

## 2022-05-24 RX ORDER — ONDANSETRON 2 MG/ML
4 INJECTION INTRAMUSCULAR; INTRAVENOUS EVERY 6 HOURS PRN
Status: DISCONTINUED | OUTPATIENT
Start: 2022-05-24 | End: 2022-05-25 | Stop reason: HOSPADM

## 2022-05-24 RX ORDER — ACETAMINOPHEN 325 MG/1
650 TABLET ORAL EVERY 6 HOURS PRN
Status: DISCONTINUED | OUTPATIENT
Start: 2022-05-24 | End: 2022-05-25 | Stop reason: HOSPADM

## 2022-05-24 RX ORDER — METOPROLOL SUCCINATE 100 MG/1
100 TABLET, EXTENDED RELEASE ORAL DAILY
Status: DISCONTINUED | OUTPATIENT
Start: 2022-05-24 | End: 2022-05-25 | Stop reason: HOSPADM

## 2022-05-24 RX ORDER — GUAIFENESIN 600 MG/1
600 TABLET, EXTENDED RELEASE ORAL 2 TIMES DAILY
Status: DISCONTINUED | OUTPATIENT
Start: 2022-05-24 | End: 2022-05-25 | Stop reason: HOSPADM

## 2022-05-24 RX ORDER — VALSARTAN 80 MG/1
160 TABLET ORAL DAILY
Status: DISCONTINUED | OUTPATIENT
Start: 2022-05-24 | End: 2022-05-25 | Stop reason: HOSPADM

## 2022-05-24 RX ORDER — ASPIRIN 81 MG/1
81 TABLET ORAL DAILY
Status: DISCONTINUED | OUTPATIENT
Start: 2022-05-25 | End: 2022-05-25 | Stop reason: HOSPADM

## 2022-05-24 RX ORDER — WARFARIN SODIUM 2 MG/1
6 TABLET ORAL
Status: COMPLETED | OUTPATIENT
Start: 2022-05-24 | End: 2022-05-24

## 2022-05-24 RX ADMIN — TAMSULOSIN HYDROCHLORIDE 0.4 MG: 0.4 CAPSULE ORAL at 10:15

## 2022-05-24 RX ADMIN — WARFARIN SODIUM 6 MG: 2 TABLET ORAL at 16:25

## 2022-05-24 RX ADMIN — IPRATROPIUM BROMIDE AND ALBUTEROL SULFATE 1 AMPULE: .5; 2.5 SOLUTION RESPIRATORY (INHALATION) at 07:37

## 2022-05-24 RX ADMIN — ACETAMINOPHEN 650 MG: 325 TABLET ORAL at 16:23

## 2022-05-24 RX ADMIN — TORSEMIDE 20 MG: 20 TABLET ORAL at 16:26

## 2022-05-24 RX ADMIN — ROSUVASTATIN CALCIUM 20 MG: 20 TABLET, FILM COATED ORAL at 21:03

## 2022-05-24 RX ADMIN — METOPROLOL SUCCINATE 100 MG: 100 TABLET, FILM COATED, EXTENDED RELEASE ORAL at 10:14

## 2022-05-24 RX ADMIN — FUROSEMIDE 40 MG: 10 INJECTION, SOLUTION INTRAMUSCULAR; INTRAVENOUS at 10:26

## 2022-05-24 RX ADMIN — VALSARTAN 160 MG: 80 TABLET, FILM COATED ORAL at 12:13

## 2022-05-24 RX ADMIN — ACETAMINOPHEN 650 MG: 325 TABLET ORAL at 10:13

## 2022-05-24 RX ADMIN — GUAIFENESIN 600 MG: 600 TABLET ORAL at 10:14

## 2022-05-24 RX ADMIN — IOPAMIDOL 100 ML: 612 INJECTION, SOLUTION INTRAVENOUS at 01:09

## 2022-05-24 RX ADMIN — ACETAMINOPHEN 650 MG: 325 TABLET ORAL at 06:05

## 2022-05-24 RX ADMIN — GUAIFENESIN 600 MG: 600 TABLET ORAL at 21:03

## 2022-05-24 RX ADMIN — LISINOPRIL 20 MG: 20 TABLET ORAL at 10:15

## 2022-05-24 RX ADMIN — Medication 400 MG: at 10:15

## 2022-05-24 ASSESSMENT — PAIN DESCRIPTION - LOCATION
LOCATION: HEAD

## 2022-05-24 ASSESSMENT — PAIN - FUNCTIONAL ASSESSMENT
PAIN_FUNCTIONAL_ASSESSMENT: NONE - DENIES PAIN
PAIN_FUNCTIONAL_ASSESSMENT: ACTIVITIES ARE NOT PREVENTED
PAIN_FUNCTIONAL_ASSESSMENT: PREVENTS OR INTERFERES WITH MANY ACTIVE NOT PASSIVE ACTIVITIES
PAIN_FUNCTIONAL_ASSESSMENT: NONE - DENIES PAIN

## 2022-05-24 ASSESSMENT — ENCOUNTER SYMPTOMS
SORE THROAT: 0
ABDOMINAL PAIN: 0
VOMITING: 0
NAUSEA: 0
SHORTNESS OF BREATH: 1
CHEST TIGHTNESS: 1
BACK PAIN: 0
RHINORRHEA: 0
DIARRHEA: 0
EYE PAIN: 0
COUGH: 1

## 2022-05-24 ASSESSMENT — PAIN DESCRIPTION - ORIENTATION: ORIENTATION: MID

## 2022-05-24 ASSESSMENT — PAIN SCALES - GENERAL
PAINLEVEL_OUTOF10: 6
PAINLEVEL_OUTOF10: 0
PAINLEVEL_OUTOF10: 6
PAINLEVEL_OUTOF10: 6

## 2022-05-24 ASSESSMENT — PAIN DESCRIPTION - DESCRIPTORS
DESCRIPTORS: ACHING
DESCRIPTORS: ACHING

## 2022-05-24 NOTE — CARE COORDINATION
[facility] Case Management Initial Discharge Assessment    Met with Patient to discuss discharge plan. PCP: Jac Schroeder MD                                Date of Last Visit: april    [facility] Patient: No        Discharge Planning    Living Arrangements: independently at home    Who do you live with? ALONE    Who helps you with your care:  self    If lives at home:     Do you have any barriers navigating in your home? no    Patient can perform ADL? Yes    Current Services (outpatient and in home) : Outpatient PT/OT ([facility])    Dialysis: No    Is transportation available to get to your appointments? 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 Respiratory provider:  no     Pharmacy:  yes - Roshni Oneil with Medication Assistance Program?  No      Patient agreeable to [facility]? Declined    Patient agreeable to SNF/Rehab? Declined    Other discharge needs identified? N/A    Does Patient Have a High-Risk for Readmission Diagnosis (CHF, PN, MI, COPD)? Yes, see care coordinator assessment    If Yes,     Consult with pulmonologist? Yes   Consult with cardiologist? Yes   Cardiac Rehab referral if EF <35%? No   Consult with Pharmacy for medication assessment prior to discharge? N/A   Consult with Behavioral health to aid in depression, anxiety, or coping issues? N/A   Palliative Care Consult? N/A   Pulmonary Rehab order for COPD, PN, and CHF (if EF > 35%)? N/A    Does patient have a reliable scale and know how to read it (for CHF)? N/A   Nutrition consult for CHF? N/A   Respiratory therapy consult that includes bedside instruction on administration of nebulizers and/or inhalers, and assessment of oxygen and equipment needs in the home? Yes    Initial Discharge Plan?  (Note: please see concurrent daily documentation for any updates after initial note).    MET WITH PATIENT, DENIES ANY HOME NEEDS. PT PLANS TO CONTINUE OP PHYSICAL THERAPY AT [facility].       Readmission Risk              Risk of Unplanned Readmission:  0         Electronically signed by Sonido Spicer RN on 5/24/2022 at 11:02 AM

## 2022-05-24 NOTE — H&P
Hospital Medicine  History and Physical    Patient:  Jennifer Adjutant  MRN: [id number]    CHIEF COMPLAINT:    Chief Complaint   Patient presents with    Shortness of Breath       History Obtained From:  patient, electronic medical record  Primary Care Physician: Radha Lorenz MD    HISTORY OF PRESENT ILLNESS:   The patient is a 80 y.o. male who presents with chest pain which woke him from sleep. Patient reports the chest pain was in the left breast nonradiating pressure-like pain. no associated diaphoresis but did have associated dyspnea. Presentation to the ED BMP is benign and cardiac enzymes are negative however EKG performed in the ED is not able to be obtained or reviewed as it has not been uploaded to the EMR. Patient is an 71-year-old male he has a history of atrial fibrillation and retinal artery occlusion for which she is on anticoagulant on Coumadin INR 1.9 he has a history of PVD status post vein stripping in the right lower extremity as well as right lower extremity cellulitis treated several months previously. He has a history of urine retention on Flomax as well as nonobstructive coronary disease. Patient follows with Dr. Rachana Holm after pacemaker placement and follows with Claudy Sanchez is his cardiologist.    Past Medical History:  Retinal artery occlusion, CARO, PFO, CAD, AV block pacer dependent, CVA, PAF, HTN, PE,     Past Surgical History:  pacemaker placement. B/l le vein stripping. Medications Prior to Admission:    Prior to Admission medications    Medication Sig Start Date End Date Taking? Authorizing Provider   TROSPIUM CHLORIDE ER PO Take 20 mg by mouth daily    Historical Provider, MD   warfarin (COUMADIN) 5 MG tablet Take as directed by [facility] [place] [facility]. Quantity equals 90 day supply. Patient taking differently: Take 5 mg by mouth daily On Monday and Friday - Take 2.5mg. On Tues, Weds, Thurs, Sat, and Sun take 5mg.   Take as directed by [facility] [place] [facility] [place] [facility]. Quantity equals 90 day supply. 12/20/21   Adelina Ni MD   aspirin 81 MG tablet Take 81 mg by mouth daily    Historical Provider, MD   vitamin C (ASCORBIC ACID) 500 MG tablet Take 1,000 mg by mouth See Admin Instructions Take 1 tablet 2-3 times a week    Historical Provider, MD   Cholecalciferol (VITAMIN D3) 5000 units TABS Take 1 tablet by mouth every other day     Historical Provider, MD   vitamin E 400 UNIT capsule Take 400 Units by mouth every other day    Historical Provider, MD Bae Barnacle Oil 1000 MG CAPS Take 1 capsule by mouth See Admin Instructions Take 1 Tablet 2-3 times a week  Patient not taking: Reported on 5/24/2022    Historical Provider, MD   Coenzyme Q10 30 MG/5ML LIQD Take 10 mLs by mouth See Admin Instructions Take 2 teaspoons twice per week  Patient not taking: Reported on 5/24/2022    Historical Provider, MD   magnesium oxide (MAG-OX) 400 MG tablet Take 400 mg by mouth daily    Historical Provider, MD   Misc Natural Products (TURMERIC CURCUMIN) CAPS Take 1 capsule by mouth See Admin Instructions Take 1 capsule three days a week  Patient not taking: Reported on 5/24/2022    Historical Provider, MD   tamsulosin (FLOMAX) 0.4 MG capsule Take 0.4 mg by mouth daily 6/26/18   Germán Rob MD   metoprolol succinate (TOPROL XL) 100 MG extended release tablet Take 100 mg by mouth daily Take 100mg in the AM. Take 50mg in the PM    Historical Provider, MD   amLODIPine (NORVASC) 5 MG tablet Take 5 mg by mouth daily  Patient not taking: Reported on 5/24/2022    Historical Provider, MD   lisinopril (PRINIVIL;ZESTRIL) 20 MG tablet Take 20 mg by mouth 2 times daily    Historical Provider, MD   atorvastatin (LIPITOR) 10 MG tablet Take 5 mg by mouth daily     Historical Provider, MD       Allergies:  Adhesive tape    Social History:   TOBACCO:   reports that he has never smoked. He has never used smokeless tobacco.  ETOH:   reports current alcohol use.   OCCUPATION: none    Family History:   History reviewed. No pertinent family history. REVIEW OF SYSTEMS:  Ten systems reviewed and negative except for as above. Physical Exam:    Vitals: BP (!) 140/91   Pulse 70   Temp 98.2 °F (36.8 °C) (Oral)   Resp 18   Ht 6' 3\" (1.905 m)   Wt 250 lb (113.4 kg)   SpO2 92%   BMI 31.25 kg/m²   Constitutional: alert, appears stated age and cooperative  Skin: Skin color, texture, turgor normal. No rashes or lesions  Eyes:Eye: Normal external eye, conjunctiva, VIKTOR. ENT: Head: Normocephalic, no lesions, without obvious abnormality. Neck: no adenopathy, no carotid bruit, JVD with hepatojugular reflux  Cardiovascular: regular rate and rhythm, S1, S2 normal, no murmur, click, rub or gallop  Gastrointestinal: soft, non-tender; bowel sounds normal; no masses,  no organomegaly  Genitourinary: Deferred  Musculoskeletal:extremities normal, atraumatic,b/l LE edema right worse than left with venus stasis color changes, dependent rubor  Neurologic: Mental status AAOx3 No facial asymmetry or droop. Normal muscle strength b/l. Psychiatric: Appropriate mood and affect. Good insight and judgement  Hematologic: No obvious bruising or bleeding    Recent Labs     05/24/22 0015   WBC 8.7   HGB 13.3*        Recent Labs     05/24/22 0015      K 4.1      CO2 26   BUN 14   CREATININE 0.86   GLUCOSE 121*   AST 18   ALT 16   BILITOT 0.8*   ALKPHOS 55     Troponin T:   Recent Labs     05/24/22 0015   TROPONINI <0.010     ABGs: No results found for: PHART, PO2ART, [id number]  INR:   Recent Labs     05/24/22 0015   INR 1.9     URINALYSIS:No results for input(s): NITRITE, COLORU, PHUR, LABCAST, WBCUA, RBCUA, MUCUS, TRICHOMONAS, YEAST, BACTERIA, CLARITYU, SPECGRAV, LEUKOCYTESUR, UROBILINOGEN, BILIRUBINUR, BLOODU, GLUCOSEU, AMORPHOUS in the last 72 hours. Invalid input(s): Billy Sellmemo  -----------------------------------------------------------------   No results found.     EKG: ordered Stat, pending    Assessment and Plan   1. Chest pain: possible developing pulmonary edema related to holding diuretic. Lasix 1 dose. Trend trops/ekg. Stat EKG, trend trops, consult cardio. Pacer evaluation. Echo if indicated by cardiology. Consult [facility]. Repeat CXR following pulmonary hygiene. 2. CARO: resume cpap with sleep,  No home cpap due to recall  3. COPD without exacerbation: prn albuterol  4. BPH: flomax. 5. PAF: rate control, consult pharmacy for warfarin. 6. Hx CVA with PFO: warfarin as above  7. DVT ppx held given theraputic INR.      Patient Active Problem List   Diagnosis Code    Pulmonary embolism, iatrogenic ([facility] [address] T81.718A, I26.99    Sleep apnea G47.30    [facility] J44.1    Dyspnea on exertion R06.00       Scar Curiel DO   Admitting Hospitalist    Emergency Contact:

## 2022-05-24 NOTE — ED PROVIDER NOTES
[address] [facility]  eMERGENCY dEPARTMENT eNCOUnter      Pt Name: Jorge A Matthews  MRN: [id number]  [place] 1940  Date of evaluation: 5/23/2022  Provider: Ilda Fam MD      HISTORY OF PRESENT ILLNESS      Chief Complaint   Patient presents with    Shortness of Breath       The history is provided by the Patient. Jorge A Matthews is a 80 y.o. 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 Patient states the swelling in his lower extremities has been fairly constant as of late. Was recently diagnosed with a cellulitis and required admission last month for it. States it is improving in both pain and coloration. Took all antibiotics as directed. States he has had persistent swelling though. Has required diuretics in the past, but is not on them currently. States shortness of breath started last night, waking the patient up from sleeping. Does seem to be worse with lying flat and exerting himself. Has also had a dry cough associated with this. Complains of chest pain that is left-sided, parasternal and nonradiating. States pain is aching, pressure-like sensation. Denies any associated F/C/D, hemoptysis, abdominal pain, N/V/D/C. No changes in bowel movements or urination. States he has been taking his Coumadin as directed, but has required multiple adjustments over the past several weeks with the antibiotics that he was on. Also states he has been wheezing. Denies any history of COPD, asthma. Does not take breathing treatments at home. Is also not on any regular O2. Does have a CPAP at night, but has not been able to use it over the past 6 months due to a recall.     Per Chart Review: Most recent hospitalization in 04/2022 for cellulitis appreciated. Admitted for 3 days at that time. RLE doppler negative. Improved on vancomycin. No recent CTA of the chest appreciated. REVIEW OF SYSTEMS       Review of Systems   Constitutional: Positive for fatigue. Negative for activity change, appetite change, chills, diaphoresis and fever. HENT: Negative for rhinorrhea and sore throat. Eyes: Negative for pain and visual disturbance. Respiratory: Positive for cough, chest tightness and shortness of breath. Cardiovascular: Positive for chest pain and leg swelling. Negative for palpitations. Gastrointestinal: Negative for abdominal pain, diarrhea, nausea and vomiting. Genitourinary: Negative for dysuria. Musculoskeletal: Negative for arthralgias, back pain, myalgias and neck pain. Skin: Negative for rash. Neurological: Negative for weakness, numbness and headaches. PAST MEDICAL HISTORY   History reviewed. No pertinent past medical history. SURGICAL HISTORY     History reviewed. No pertinent surgical history. FAMILY HISTORY     History reviewed. No pertinent family history. SOCIAL HISTORY       Social History     Socioeconomic History    Marital status:      Spouse name: None    Number of children: None    Years of education: None    Highest education level: None   Occupational History    None   Tobacco Use    Smoking status: Never Smoker    Smokeless tobacco: Never Used   Substance and Sexual Activity    Alcohol use: Yes     Comment: occ    Drug use: No    Sexual activity: None   Other Topics Concern    None   Social History Narrative    None     Social Determinants of Health     Financial Resource Strain:     Difficulty of Paying Living Expenses: Not on file   Food Insecurity:     Worried About Running Out of Food in the Last Year: Not on file    Altagracia of Food in the Last Year: Not on file   Transportation Needs:     Lack of Transportation (Medical):  Not on file    Lack of Transportation (Non-Medical): Not on file   Physical Activity:     Days of Exercise per Week: Not on file    Minutes of Exercise per Session: Not on file   Stress:     Feeling of Stress : Not on file   Social Connections:     Frequency of Communication with Friends and Family: Not on file    Frequency of Social Gatherings with Friends and Family: Not on file    Attends Church Services: Not on file    Active Member of [address] [place] [address] or Organizations: Not on file    Attends Club or Organization Meetings: Not on file    Marital Status: Not on file   Intimate Partner Violence:     Fear of Current or Ex-Partner: Not on file    Emotionally Abused: Not on file    Physically Abused: Not on file    Sexually Abused: Not on file   Housing Stability:     Unable to Pay for Housing in the Last Year: Not on file    Number of [place] in the Last Year: Not on file    Unstable Housing in the Last Year: Not on file       CURRENT MEDICATIONS       Current Discharge Medication List      CONTINUE these medications which have NOT CHANGED    Details   TROSPIUM CHLORIDE ER PO Take 20 mg by mouth daily      warfarin (COUMADIN) 5 MG tablet Take as directed by [facility]. Quantity equals 90 day supply.   Qty: 100 tablet, Refills: 1      aspirin 81 MG tablet Take 81 mg by mouth daily      vitamin C (ASCORBIC ACID) 500 MG tablet Take 1,000 mg by mouth See Admin Instructions Take 1 tablet 2-3 times a week      Cholecalciferol (VITAMIN D3) 5000 units TABS Take 1 tablet by mouth every other day       vitamin E 400 UNIT capsule Take 400 Units by mouth every other day      Krill Oil 1000 MG CAPS Take 1 capsule by mouth See Admin Instructions Take 1 Tablet 2-3 times a week      Coenzyme Q10 30 MG/5ML LIQD Take 10 mLs by mouth See Admin Instructions Take 2 teaspoons twice per week      magnesium oxide (MAG-OX) 400 MG tablet Take 400 mg by mouth daily      Misc Natural Products (TURMERIC CURCUMIN) CAPS Take 1 capsule by mouth See Admin Instructions Take 1 capsule three days a week      tamsulosin (FLOMAX) 0.4 MG capsule Take 0.4 mg by mouth daily      metoprolol succinate (TOPROL XL) 100 MG extended release tablet Take 100 mg by mouth daily Take 100mg in the AM. Take 50mg in the PM      amLODIPine (NORVASC) 5 MG tablet Take 5 mg by mouth daily      lisinopril (PRINIVIL;ZESTRIL) 20 MG tablet Take 20 mg by mouth 2 times daily      atorvastatin (LIPITOR) 10 MG tablet Take 5 mg by mouth daily              ALLERGIES     Adhesive tape      PHYSICAL EXAM       ED Triage Vitals   BP Temp Temp Source Pulse Resp SpO2 Height Weight   05/24/22 0005 05/24/22 0238 05/24/22 0238 05/24/22 0005 05/24/22 0005 05/24/22 0005 05/24/22 0007 05/24/22 0007   (!) 161/107 98.2 °F (36.8 °C) Oral 70 20 93 % 6' 3\" (1.905 m) 250 lb (113.4 kg)       Physical Exam  Vitals and nursing note reviewed. Constitutional:       General: He is not in acute distress. Appearance: He is ill-appearing. He is not toxic-appearing or diaphoretic. HENT:      Head: Normocephalic and atraumatic. Mouth/Throat:      Mouth: Mucous membranes are dry. Pharynx: Oropharynx is clear. Eyes:      Extraocular Movements: Extraocular movements intact. Pupils: Pupils are equal, round, and reactive to light. Cardiovascular:      Rate and Rhythm: Normal rate and regular rhythm. Pulses: Normal pulses. Pulmonary:      Effort: Tachypnea, accessory muscle usage and prolonged expiration present. No respiratory distress. Breath sounds: Examination of the right-middle field reveals rales. Examination of the left-middle field reveals rales. Examination of the right-lower field reveals rales. Examination of the left-lower field reveals rales. Wheezing and rales present. Comments: Scattered expiratory wheezes. Mild accessory muscle usage throughout. Mildly prolonged expiratory phase. Chest:      Chest wall: No tenderness.       Comments: PPM palpable over the left anterior chest wall. Abdominal:      General: There is no distension. Palpations: Abdomen is soft. Tenderness: There is no abdominal tenderness. There is no right CVA tenderness, left CVA tenderness, guarding or rebound. Musculoskeletal:      Cervical back: Normal range of motion and neck supple. No tenderness. Right lower le+ Pitting Edema present. Left lower le+ Pitting Edema present. Skin:     General: Skin is warm and dry. Capillary Refill: Capillary refill takes less than 2 seconds. Neurological:      General: No focal deficit present. Mental Status: He is alert and oriented to person, place, and time. Sensory: No sensory deficit. Motor: No weakness.    Psychiatric:         Mood and Affect: Mood normal.         Behavior: Behavior normal.         MDM:   Chart Reviewed: PMH and additional information as noted in HPI obtained from chart review    Vitals:    Vitals:    22 0129 22 0221 22 0238 22 0355   BP:  (!) 140/91     Pulse:  70  70   Resp:  20  18   Temp:   98.2 °F (36.8 °C)    TempSrc:   Oral    SpO2: 97% 94%  92%   Weight:       Height:           PROCEDURES:  Unless otherwise noted below, none  Procedures    LABS:  Labs Reviewed   COMPREHENSIVE METABOLIC PANEL - Abnormal; Notable for the following components:       Result Value    Glucose 121 (*)     Total Bilirubin 0.8 (*)     All other components within normal limits   CBC WITH AUTO DIFFERENTIAL - Abnormal; Notable for the following components:    RBC 4.55 (*)     Hemoglobin 13.3 (*)     Hematocrit 39.7 (*)     Neutrophils Absolute 6.8 (*)     Lymphocytes Absolute 0.9 (*)     All other components within normal limits   PROTIME-INR - Abnormal; Notable for the following components:    Protime 21.8 (*)     All other components within normal limits   COVID-19, RAPID   RAPID INFLUENZA A/B ANTIGENS   LACTIC ACID   MAGNESIUM   TROPONIN   BRAIN NATRIURETIC PEPTIDE   CK       XR CHEST PORTABLE    (Results Pending)   CTA CHEST W WO CONTRAST    (Results Pending)       ED Course as of 05/24/22 0537   Tue May 24, 2022   0005 EKG showing ventricular paced rhythm at rate of 74 bpm.  Left axis deviation. No gross acute ischemic changes in the setting of paced rhythm. [NA]   0205 SARS-CoV-2, NAAT: Not Detected [NA]   0205 Influenza A by PCR: Negative [NA]   0205 Influenza B by PCR: Negative [NA]   0205 Magnesium: 2.1 [NA]   0205 INR: 1.9  Mildly subtherapeutic. [NA]   0205 Hemoglobin Quant(!): 13.3  Minimal anemia. CBC otherwise unremarkable [NA]   0205 Troponin: <0.010  WNL, low suspicion for ACS [NA]   0205 Lactic Acid: 0.9  Lower suspicion for hypoperfusion. [NA]   0206 Magnesium: 2.1 [NA]   0354 CTA [address]  Preliminary radiology read: Mild bilateral pleural effusions with the lingular and bilateral lower lobe atelectasis. Cardiomegaly with coronary artery calcifications. Atherosclerotic disease of aorta with no dissection or aneurysm. No pulmonary embolus. Degenerative disease of the spine. Remainder of findings as noted in full report. [NA]      ED Course User Index  [NA] Shravan Mosqueda MD       80 y.o. 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 Upon initial evaluation, Pt with mildly increased work of breathing and borderline hypoxia at rest, but otherwise Afebrile, HDS and in NAD. PE as noted above. Labs, , EKG, and Imaging as noted above. Given findings, clinical presentation most likely consistent w/ shortness of breath and hypoxic respiratory failure thought likely secondary to cardiogenic etiology at this time. Although considered, no evidence of PE on CTA of the chest.  Did note small bilateral pleural effusions. Patient has been off of his CPAP for the past 6 months raising concern for possible cardiogenic etiology. Troponin negative in the ED, low suspicion for ACS. No gross evidence of acute infectious processes. Low suspicion for DVT. Will be admitted to medicine for further evaluation and management. After ambulating without oxygen, patient was noted to be satting from 87 to 88%. Did improve on O2. Pt was administered   Medications   iopamidol (ISOVUE-300) 61 % injection 100 mL (100 mLs IntraVENous Given 5/24/22 0109)       Plan: Admit to IM for further evaluation and management. Report given to Dr. Mejia Found. and Patient understanding and amenable to the POC. CRITICAL CARE TIME   Total CriticalCare time was 0 minutes, excluding separately reportable procedures. There was a high probability of clinically significant/life threatening deterioration in the patient's condition which required my urgent intervention. FINAL IMPRESSION      1.  Shortness of breath          DISPOSITION/PLAN   DISPOSITION Admitted 05/24/2022 03:35:45 AM      Current Discharge Medication List           MD Cintia Bueno MD  05/24/22 9629

## 2022-05-24 NOTE — PROGRESS NOTES
Hospitalist Progress Note      PCP: Cece Buchanan MD    Date of Admission: 5/23/2022    Chief Complaint:      Subjective: :  Pt seen and examined. Pt resting comfortably, denies CP. Reports HA d/t nitro. Discussed with Dr. Akbar Coronado, plan to adjust cardiac medications and obtain echo today. Patient denies other complaints        Medications:  Reviewed    Infusion Medications     Scheduled Medications    guaiFENesin  600 mg Oral BID    rosuvastatin  20 mg Oral Nightly    [START ON 5/25/2022] aspirin  81 mg Oral Daily    metoprolol succinate  100 mg Oral Daily    tamsulosin  0.4 mg Oral Daily    magnesium oxide  400 mg Oral Daily    valsartan  160 mg Oral Daily    torsemide  20 mg Oral Daily    warfarin placeholder: dosing by pharmacy   Other RX Placeholder    warfarin  6 mg Oral Once     PRN Meds: acetaminophen, ondansetron **OR** ondansetron, nitroGLYCERIN      Intake/Output Summary (Last 24 hours) at 5/24/2022 1334  Last data filed at 5/24/2022 9324  Gross per 24 hour   Intake 120 ml   Output --   Net 120 ml       Exam:    BP (!) 148/90   Pulse 71   Temp 97.7 °F (36.5 °C) (Oral)   Resp 17   Ht 6' 3\" (1.905 m)   Wt 250 lb (113.4 kg)   SpO2 93%   BMI 31.25 kg/m²     GEN: Alert and oriented x3, resting comfortably, no sign of distress  HEENT: Normocephalic, atraumatic. VIKTOR, Neck supple. Resp: CTA b/l no wheezing or rhonchi. No respiratory distress  Cardio: RRR. No murmur  Abd: Soft, nondistended. NTTP.  BS present  Ext: Trace edema bilaterally, chronic venous stasis changes  Skin is warm and dry        Labs:   Recent Labs     05/24/22  0015   WBC 8.7   HGB 13.3*   HCT 39.7*        Recent Labs     05/24/22  0015      K 4.1      CO2 26   BUN 14   CREATININE 0.86   CALCIUM 9.2     Recent Labs     05/24/22  0015   AST 18   ALT 16   BILITOT 0.8*   ALKPHOS 55     Recent Labs     05/24/22  0015   INR 1.9     Recent Labs     05/24/22  0015 05/24/22  0625 05/24/22  1217   CKTOTAL 71 --   --    TROPONINI <0.010 <0.010 <0.010       Urinalysis:      Lab Results   Component Value Date    NITRU NEG 11/07/2011    BLOODU NEG 11/07/2011    SPECGRAV 1.014 11/07/2011    GLUCOSEU NEG 11/07/2011       Radiology:  CTA CHEST W WO CONTRAST   Final Result      No CT evidence for acute pulmonary embolism. Trace bilateral pleural effusions with associated opacities/atelectasis. XR CHEST PORTABLE   Final Result   The findings most likely reflect chronic congestive heart failure, CHF. .                   XR CHEST (2 VW)    (Results Pending)           Assessment/Plan:    Active Hospital Problems    Diagnosis Date Noted    COPD exacerbation ([facility] [address] [J44.1] 05/24/2022     Priority: Medium    Dyspnea on exertion [R06.00] 05/24/2022     Priority: Medium    Chest pain [R07.9] 05/24/2022     Priority: Medium        Chest pain  Acute on chronic systolic heart failure  -Pt currently denies CP, admits to exertional dyspnea and lower extremity edema  -Cardiology consulted and adjusting medications. Norvasc discontinued due to concern for lower extremity edema, a switch to ARB due to report of cough  -Echo  -IV Lasix x1, started on oral torsemide    Paroxysmal atrial fib anticoagulated on Coumadin  -Rate controlled, pharmacy for Coumadin dosing    CARO: CPAP  BPH Flomax  History of CVA with PFO  History of pulmonary embolism    Additional work up or/and treatment plan may be added today or then after based on clinical progression. I am managing a portion of pt care. Some medical issues are handled by other specialists. Additional work up and treatment should be done in out pt setting by pt PCP and other out pt providers. DVT prophylaxis: On coumadin  Dispo: Echo pending, diuresing, anticipate possible discharge tomorrow pending clinical course  Diet: ADULT DIET; Regular; Low Fat/Low Chol/High Fiber/2 gm Na;  Low Sodium (2 gm); 2000 ml; No Caffeine    Code Status: Full Code    PT/OT Eval Electronically signed by Reanna Cosme DO on 5/24/2022 at 1:34 PM

## 2022-05-24 NOTE — ED NOTES
Patient ambulatory to the restroom. Walking pulse ox 88% on room air. Auditory wheezes. Patient recovered well at rest. 94% on room air. Dr. Laura Dunbar notified.      Tish Vasquez RN  05/24/22 6555

## 2022-05-24 NOTE — CONSULTS
Cardiology Consult Note      Date:   5/24/2022  Patient name:  Livia King  Date of admission:  5/23/2022 11:57 PM  MRN:   [id number]  YOB: 1940  Time of Consult:  9:59 AM    Consulting Cardiologist: Dr. Luzma Whitehead APRN-CNP  Primary Cardiologist:  Dr. Ethan Sharif    Referring Provider: Dr. Jannet Pino MD    Consult Reason:  Chest pain/Shortness of breath     CARDIOLOGIST NOTE  I have personally performed a complete face to face history and physical on this patient. I have reviewed the notations as entered by NP Lonnie Whitehead I have personally  formulated the impression and plan on this patient and amended as necessary. Anshu Colón MD [address] [facility] with note as below. Patient absolutely denies chest tightness or pressure. He has been more short of breath the last 2 weeks. He notes increasing lower extremity edema as well. He describes a chronic dry cough for quite some time. His main concern was that his throat felt very scratchy and was a bit hard to catch his breath when he would exert himself. He has had no dizziness, lightheadedness or syncope. Exam demonstrates mild elevation jugular venous pressures. He has a regular rhythm and rate he has 2+ edema. Abdomen is obese soft nontender. Cardiac exam shows a regular rhythm and rate without clear-cut murmur but heart sounds are distant. Diminished breath sounds posteriorly with crackles on the left    Chest x-ray CHF    Impression  Minimal coronary disease and previous cardiac cath although cannot exclude progression I think that less likely. I suspect given that he is pacemaker dependent with continuous pacing for several years this is a pacemaker mediated cardiomyopathy. Await echocardiogram.  Agree with diuresis at this time. Discontinue amlodipine. Discontinue ACE inhibitor and replace with AR-2 blocker.     May need pacer upgrade to CRT    Recommendations after echo result available  Desiree Sanchez MD        Assessment:  1. Chest pain: Denies chest pain, states that he took SL nitro for shortness of breath. 2. Shortness of breath: Chest x-ray showed findings most likely reflective of chronic congestive heart failure. States that his shortness of breath is chronic in nature but has been worsening over the last few days. States that he notices it most with exertion. Denies orthopnea. 3. Acute on chronic systolic CHF:  BNP 3850. LVEF 40% on heart cath 2015, LVEF Echo 50%   Will obtain ECHO. Lasix 40 mg IV administered x 1. Will most likely need additional diuretics     4. Permanent atrial fibrillation: Managed with rate control strategy. 5. Hypertension:  Sub optimal control, Amlodipine currently on hold due to lower extremity edema. Possible add hydralazine and nitrates. Plan:   1. Monitor on telemetry    2. Obtain Echocardiogram     3. Continue current cardiac medications    4. Further recommendations pending ECHO    5. Further recommendations per Dr. Davie Correa       HPI:   Hernan Valdivia is a 80 y.o.  male patient who is being at the request of Dr. Laura Kidd  for inpatient consultation of chest pain. He was admitted on 5/23/2022. Previous [address] [facility] and [id number] [facility] records have been reviewed in detail. 80 yr old male with PMH of permanent atrial fibrillation being managed with a rate control strategy. He was previously on sotalol and had prolonged episodes of atrial fibrillation that were asymptomatic. Dr. Lara Heading recommended discontinuing the sotalol. He has a history of sick sinus syndrome and complete heart block for which he is status post pacemaker implant back in 2011. He underwent a generator change on September 27, 2019 to a [employer] XT DR MRI device. Echocardiogram 2015 showed LVEF 50%. He has a history of mild coronary artery disease with cardiac catheterization in 2015 showing 40% distal LAD and otherwise trivial disease. He has normal left ventricular systolic function.  He Daily TABS TAKE 1 TABLET DAILY. Nitroglycerin 0.4 MG Sublingual Tablet Sublingual DISSOLVE 1 TABLET UNDER THE TONGUE AS NEEDED FOR CHEST PAIN. Tamsulosin HCl - 0.4 MG Oral Capsule TAKE 1 TAB DAILY   Trospium Chloride 20 MG Oral Tablet take 1 tab twice daily   Tylenol Extra Strength 500 MG Oral Tablet TAKE 1 TABLET EVERY 4 TO 6 HOURS AS NEEDED. Vitamin C 1000 MG Oral Tablet TAKE 1 TABLET TWO TO THREE TIMES WEEKLY   Vitamin D-3 125 MCG (5000 UT) Oral Tablet TAKE 1 TAB DAILY   Vitamin E 400 UNIT Oral Tablet TAKE 1 TABLET EVERY OTHER DAY. Allergies: Allergies   Allergen Reactions    Adhesive Tape Rash       Past Medical History:  History reviewed. No pertinent past medical history. Past Surgical History:  History reviewed. No pertinent surgical history. Family History:   History reviewed. No pertinent family history. Social  History:     Social History     Tobacco Use    Smoking status: Never Smoker    Smokeless tobacco: Never Used   Substance Use Topics    Alcohol use: Yes     Comment: occ       Home Medications:    Prior to Admission medications    Medication Sig Start Date End Date Taking? Authorizing Provider   TROSPIUM CHLORIDE ER PO Take 20 mg by mouth daily    Historical Provider, MD   warfarin (COUMADIN) 5 MG tablet Take as directed by [facility]. Quantity equals 90 day supply. Patient taking differently: Take 5 mg by mouth daily On Monday and Friday - Take 2.5mg. On Tues, Weds, Thurs, Sat, and Sun take 5mg. Take as directed by [facility]. Quantity equals 90 day supply.  12/20/21   Reggie Orr MD   aspirin 81 MG tablet Take 81 mg by mouth daily    Historical Provider, MD   vitamin C (ASCORBIC ACID) 500 MG tablet Take 1,000 mg by mouth See Admin Instructions Take 1 tablet 2-3 times a week    Historical Provider, MD   Cholecalciferol (VITAMIN D3) 5000 units TABS Take 1 tablet by mouth every other day Historical Provider, MD   vitamin E 400 UNIT capsule Take 400 Units by mouth every other day    Historical Provider, MD Pichardo Sink Oil 1000 MG CAPS Take 1 capsule by mouth See Admin Instructions Take 1 Tablet 2-3 times a week  Patient not taking: Reported on 5/24/2022    Historical Provider, MD   Coenzyme Q10 30 MG/5ML LIQD Take 10 mLs by mouth See Admin Instructions Take 2 teaspoons twice per week  Patient not taking: Reported on 5/24/2022    Historical Provider, MD   magnesium oxide (MAG-OX) 400 MG tablet Take 400 mg by mouth daily    Historical Provider, MD   Misc Natural Products (TURMERIC CURCUMIN) CAPS Take 1 capsule by mouth See Admin Instructions Take 1 capsule three days a week  Patient not taking: Reported on 5/24/2022    Historical Provider, MD   tamsulosin (FLOMAX) 0.4 MG capsule Take 0.4 mg by mouth daily 6/26/18   Hayden Villagomez MD   metoprolol succinate (TOPROL XL) 100 MG extended release tablet Take 100 mg by mouth daily Take 100mg in the AM. Take 50mg in the PM    Historical Provider, MD   amLODIPine (NORVASC) 5 MG tablet Take 5 mg by mouth daily  Patient not taking: Reported on 5/24/2022    Historical Provider, MD   lisinopril (PRINIVIL;ZESTRIL) 20 MG tablet Take 20 mg by mouth 2 times daily    Historical Provider, MD   atorvastatin (LIPITOR) 10 MG tablet Take 5 mg by mouth daily     Historical Provider, MD       Current Medications:      IV Medications:  Current Facility-Administered Medications   Medication Dose Route Frequency Provider Last Rate Last Admin    acetaminophen (TYLENOL) tablet 650 mg  650 mg Oral Q6H PRN Leata Birch Sedar, DO   650 mg at 05/24/22 0605    ondansetron (ZOFRAN-ODT) disintegrating tablet 4 mg  4 mg Oral Q8H PRN Leata Birch Sedar, DO        Or    ondansetron (ZOFRAN) injection 4 mg  4 mg IntraVENous Q6H PRN Leata Birch Sedar, DO        nitroGLYCERIN (NITROSTAT) SL tablet 0.4 mg  0.4 mg SubLINGual Q5 Min PRN Leata Birch Sedar, DO        guaiFENesin [facility]) extended release tablet 600 mg  600 mg Oral BID Lita Rogerar, DO        rosuvastatin (CRESTOR) tablet 20 mg  20 mg Oral Nightly Sami Josue, DO        [START ON 5/25/2022] aspirin EC tablet 81 mg  81 mg Oral Daily Sami Josue, DO        furosemide (LASIX) injection 40 mg  40 mg IntraVENous Once Marquise Hernandez,         metoprolol succinate (TOPROL XL) extended release tablet 100 mg  100 mg Oral Daily Melanie Chichestergeovanna Rogerar, DO        lisinopril (PRINIVIL;ZESTRIL) tablet 20 mg  20 mg Oral BID Lita Moffett Sedar, DO        tamsulosin (FLOMAX) capsule 0.4 mg  0.4 mg Oral Daily Lita Rogerar, DO        magnesium oxide (MAG-OX) tablet 400 mg  400 mg Oral Daily Lita Josue, DO             Review of Systems  Constitutional: No weight loss, fever, chills, weakness or fatigue. HEENT: No visual loss, blurred vision, double vision or yellow sclerae. Skin: No rash or itching  Cardiovascular:  No chest pain, pressure or discomfort. No palpitations. Positive for bilateral lower extremity  edema. Respiratory:  Positive for shortness of breath, denies cough or sputum. Gastrointestinal:  No anorexia, nausea, vomiting or diarrhea. No abdominal pain. No bloody or dark tarry stools. Neurological:  No headache, dizziness, syncope, paralysis, ataxia, numbness or tingling in the extremities. No change in bowel or bladder control. Musculoskeletal:  No muscle, back pain, joint pain or stiffness. Hematologic: No anemia, bleeding or bruising.         Vital Signs:  Vitals:    05/24/22 0238 05/24/22 0355 05/24/22 0737 05/24/22 0815   BP:    (!) 148/90   Pulse:  70  71   Resp:  18  17   Temp: 98.2 °F (36.8 °C)   97.7 °F (36.5 °C)   TempSrc: Oral   Oral   SpO2:  92% 95% 93%   Weight:       Height:           Intake/Output Summary (Last 24 hours) at 5/24/2022 0959  Last data filed at 5/24/2022 9737  Gross per 24 hour   Intake 120 ml   Output --   Net 120 ml       Patient Vitals for the past 96 hrs (Last 3 readings):   Weight   05/24/22 0007 250 lb (113.4 kg) Physical exam   Constitutional:  Well developed, awake/alert/oriented x3, no distress, alert and cooperative. Eyes:  PERRL, sclera clear, conjunctiva pink. EMMT:  mucous membranes  moist, no apparent injury, no lesion seen. Head/Neck:  Neck supple, no apparent injury, thyroid without mass or tenderness, No JVD, trachea midline, no bruits. Respiratory/Thorax: Bilateral lower lobe rhonchi noted   Cardiovascular: Regular, rate and rhythm,  normal S1 and S2, PMI non displaced. Gastrointestinal:  Non distended, soft, non-tender, no rebound tenderness or guarding, Genitourinary:  deferred  Musculoskeletal:  No apparent injury. Extremities:  No cyanosis, 2 - 3 + bilateral lower extremity edema, DP 2+ equal bilaterally. Radial 2+ equal bilaterally. Right lower extremity vascular/color change. Neurological:  Alert and oriented x3. Moves extremities spontaneous with purpose  Psychological:  Appropriate mood and behavior  Skin:  Warm and dry,  no lesions or rashes. Diagnostics:    [id number]  Ventricular-paced rhythm  HR 74 bpm       Telemetry: paced.       ECHO: Pending       Lab Data:  BMP:  Recent Labs     22  0015      K 4.1      CO2 26   BUN 14   CREATININE 0.86   LABGLOM >60.0       CBC:  Recent Labs     22  0015   WBC 8.7   RBC 4.55*   HGB 13.3*   HCT 39.7*   MCV 87.3   MCH 29.3   MCHC 33.6   RDW 14.1          Cardiac Enzymes:   Recent Labs     22  0015 22  0625   CKTOTAL 71  --    TROPONINI <0.010 <0.010       Hepatic Function Panel:  Recent Labs     22  0015   ALKPHOS 55   ALT 16   AST 18   PROT 6.9   BILITOT 0.8*   LABALBU 4.1       Magnesium:  Recent Labs     22  0015   MG 2.1       Pro-BNP:  Lab Results   Component Value Date    PROBNP 3,277 2022       INR:  Recent Labs     22  0015   PROTIME 21.8*   INR 1.9       TSH:  No results found for: TSH    Lipid Profile:  Lab Results   Component Value Date    TRIG 170 2012 HDL 36 06/08/2012    LDLCALC 176 06/08/2012       HgbA1C:  No results found for: LABA1C    Lactate Level:  Recent Labs     05/24/22  0015   LACTA 0.9       CMP:  Recent Labs     05/24/22  0015      K 4.1      CO2 26   BUN 14   CREATININE 0.86   GLUCOSE 121*   CALCIUM 9.2   PROT 6.9   LABALBU 4.1   BILITOT 0.8*   ALKPHOS 55   AST 18   ALT 16       Amylase:  No results for input(s): AMYLASE in the last 72 hours. Lipase:  No results for input(s): LIPASE in the last 72 hours. ABG:  No results for input(s): PH, PO2, PCO2, HCO3, BE, O2SAT in the last 72 hours. Radiology:   XR CHEST PORTABLE   Final Result   The findings most likely reflect chronic congestive heart failure, CHF. .                   CTA CHEST W WO CONTRAST    (Results Pending)   XR CHEST (2 VW)    (Results Pending)       XR CHEST PORTABLE    Result Date: 5/24/2022  XR CHEST PORTABLE: 5/24/2022 12:41 AM CLINICAL HISTORY:  SOB . COMPARISON: None available. TECHNIQUE: FINDINGS: There is a cardiac pacemaker on the left. The cardiac silhouette is at the upper limits of normal which may be secondary to cardiomegaly versus pericardial effusion. The aorta is ectatic which most commonly correlates with chronic hypertension. There are diffuse increased interstitial pulmonary markings throughout both lungs which may be secondary to pulmonary edema. There is linear atelectasis at the periphery of the left lung. There are no effusions. The findings most likely reflect chronic congestive heart failure, CHF. Ariel Sun Patient Active Problem List   Diagnosis    Pulmonary embolism, iatrogenic ([address]    Sleep apnea    COPD exacerbation (HCC)    Dyspnea on exertion    Chest pain         Thank you for the opportunity to participate in the care of your patient. Do not hesitate to call if you have any questions.     Electronically signed by JAMAAL Angel CNP, [facility] on 5/24/2022 at 9:59 AM

## 2022-05-25 ENCOUNTER — APPOINTMENT (OUTPATIENT)
Dept: GENERAL RADIOLOGY | Age: 82
End: 2022-05-25
Payer: MEDICARE

## 2022-05-25 VITALS
OXYGEN SATURATION: 93 % | BODY MASS INDEX: 29.86 KG/M2 | RESPIRATION RATE: 17 BRPM | WEIGHT: 240.2 LBS | HEIGHT: 75 IN | TEMPERATURE: 97.7 F | SYSTOLIC BLOOD PRESSURE: 143 MMHG | HEART RATE: 72 BPM | DIASTOLIC BLOOD PRESSURE: 87 MMHG

## 2022-05-25 LAB
ANION GAP SERPL CALCULATED.3IONS-SCNC: 12 MEQ/L (ref 9–15)
BASOPHILS ABSOLUTE: 0 K/UL (ref 0–0.2)
BASOPHILS RELATIVE PERCENT: 0.9 %
BUN BLDV-MCNC: 17 MG/DL (ref 8–23)
CALCIUM SERPL-MCNC: 8.9 MG/DL (ref 8.5–9.9)
CHLORIDE BLD-SCNC: 105 MEQ/L (ref 95–107)
CHOLESTEROL, TOTAL: 165 MG/DL (ref 0–199)
CO2: 27 MEQ/L (ref 20–31)
CREAT SERPL-MCNC: 0.99 MG/DL (ref 0.7–1.2)
EKG ATRIAL RATE: 72 BPM
EKG Q-T INTERVAL: 464 MS
EKG QRS DURATION: 168 MS
EKG QTC CALCULATION (BAZETT): 515 MS
EKG R AXIS: -78 DEGREES
EKG T AXIS: 96 DEGREES
EKG VENTRICULAR RATE: 74 BPM
EOSINOPHILS ABSOLUTE: 0.3 K/UL (ref 0–0.7)
EOSINOPHILS RELATIVE PERCENT: 5.6 %
GFR AFRICAN AMERICAN: >60
GFR NON-AFRICAN AMERICAN: >60
GLUCOSE BLD-MCNC: 93 MG/DL (ref 70–99)
HCT VFR BLD CALC: 40.2 % (ref 42–52)
HDLC SERPL-MCNC: 39 MG/DL (ref 40–59)
HEMOGLOBIN: 13.3 G/DL (ref 14–18)
INR BLD: 2.3
LDL CHOLESTEROL CALCULATED: 111 MG/DL (ref 0–129)
LYMPHOCYTES ABSOLUTE: 1.4 K/UL (ref 1–4.8)
LYMPHOCYTES RELATIVE PERCENT: 24.7 %
MCH RBC QN AUTO: 29.1 PG (ref 27–31.3)
MCHC RBC AUTO-ENTMCNC: 33.2 % (ref 33–37)
MCV RBC AUTO: 87.5 FL (ref 80–100)
MONOCYTES ABSOLUTE: 0.7 K/UL (ref 0.2–0.8)
MONOCYTES RELATIVE PERCENT: 12.1 %
NEUTROPHILS ABSOLUTE: 3.2 K/UL (ref 1.4–6.5)
NEUTROPHILS RELATIVE PERCENT: 56.7 %
PDW BLD-RTO: 13.9 % (ref 11.5–14.5)
PLATELET # BLD: 169 K/UL (ref 130–400)
POTASSIUM REFLEX MAGNESIUM: 3.8 MEQ/L (ref 3.4–4.9)
PROTHROMBIN TIME: 24.7 SEC (ref 12.3–14.9)
RBC # BLD: 4.59 M/UL (ref 4.7–6.1)
SODIUM BLD-SCNC: 144 MEQ/L (ref 135–144)
TRIGL SERPL-MCNC: 73 MG/DL (ref 0–150)
WBC # BLD: 5.6 K/UL (ref 4.8–10.8)

## 2022-05-25 PROCEDURE — 85610 PROTHROMBIN TIME: CPT

## 2022-05-25 PROCEDURE — 6370000000 HC RX 637 (ALT 250 FOR IP): Performed by: INTERNAL MEDICINE

## 2022-05-25 PROCEDURE — 85025 COMPLETE CBC W/AUTO DIFF WBC: CPT

## 2022-05-25 PROCEDURE — 36415 COLL VENOUS BLD VENIPUNCTURE: CPT

## 2022-05-25 PROCEDURE — G0378 HOSPITAL OBSERVATION PER HR: HCPCS

## 2022-05-25 PROCEDURE — 80048 BASIC METABOLIC PNL TOTAL CA: CPT

## 2022-05-25 PROCEDURE — 80061 LIPID PANEL: CPT

## 2022-05-25 PROCEDURE — 71046 X-RAY EXAM CHEST 2 VIEWS: CPT

## 2022-05-25 PROCEDURE — 93005 ELECTROCARDIOGRAM TRACING: CPT | Performed by: INTERNAL MEDICINE

## 2022-05-25 RX ORDER — METOPROLOL SUCCINATE 100 MG/1
100 TABLET, EXTENDED RELEASE ORAL DAILY
Qty: 30 TABLET | Refills: 3 | Status: SHIPPED | OUTPATIENT
Start: 2022-05-26

## 2022-05-25 RX ORDER — ISOSORBIDE MONONITRATE 30 MG/1
30 TABLET, EXTENDED RELEASE ORAL DAILY
Qty: 30 TABLET | Refills: 3 | Status: CANCELLED | OUTPATIENT
Start: 2022-05-25

## 2022-05-25 RX ORDER — VALSARTAN 160 MG/1
160 TABLET ORAL DAILY
Qty: 30 TABLET | Refills: 3 | Status: SHIPPED | OUTPATIENT
Start: 2022-05-26

## 2022-05-25 RX ORDER — WARFARIN SODIUM 5 MG/1
5 TABLET ORAL
Status: DISCONTINUED | OUTPATIENT
Start: 2022-05-25 | End: 2022-05-25 | Stop reason: HOSPADM

## 2022-05-25 RX ORDER — TORSEMIDE 20 MG/1
20 TABLET ORAL DAILY
Qty: 30 TABLET | Refills: 3 | Status: SHIPPED | OUTPATIENT
Start: 2022-05-26

## 2022-05-25 RX ADMIN — GUAIFENESIN 600 MG: 600 TABLET ORAL at 08:19

## 2022-05-25 RX ADMIN — VALSARTAN 160 MG: 80 TABLET, FILM COATED ORAL at 08:19

## 2022-05-25 RX ADMIN — METOPROLOL SUCCINATE 100 MG: 100 TABLET, FILM COATED, EXTENDED RELEASE ORAL at 08:18

## 2022-05-25 RX ADMIN — Medication 400 MG: at 08:19

## 2022-05-25 RX ADMIN — TAMSULOSIN HYDROCHLORIDE 0.4 MG: 0.4 CAPSULE ORAL at 08:18

## 2022-05-25 RX ADMIN — TORSEMIDE 20 MG: 20 TABLET ORAL at 08:19

## 2022-05-25 RX ADMIN — ASPIRIN 81 MG: 81 TABLET, COATED ORAL at 08:19

## 2022-05-25 ASSESSMENT — PAIN SCALES - GENERAL: PAINLEVEL_OUTOF10: 0

## 2022-05-25 NOTE — DISCHARGE INSTR - DIET
Good nutrition is important when healing from an illness, injury, or surgery. Follow any nutrition recommendations given to you during your hospital stay. If you were given an oral nutrition supplement while in the hospital, continue to take this supplement at home. You can take it with meals, in-between meals, and/or before bedtime. These supplements can be purchased at most local grocery stores, pharmacies, and chain [employer]-stores. If you have any questions about your diet or nutrition, call the hospital and ask for the dietitian.   LOW SODIUM DIET

## 2022-05-25 NOTE — PROGRESS NOTES
Hospitalist Progress Note      PCP: Zeus Coelho MD    Date of Admission: 5/23/2022    Chief Complaint:      Subjective: :  Patient reports his breathing is much better today. He is net -4.7L  Echo read is pending  BP suboptimal      Medications:  Reviewed    Infusion Medications     Scheduled Medications    guaiFENesin  600 mg Oral BID    rosuvastatin  20 mg Oral Nightly    aspirin  81 mg Oral Daily    metoprolol succinate  100 mg Oral Daily    tamsulosin  0.4 mg Oral Daily    magnesium oxide  400 mg Oral Daily    valsartan  160 mg Oral Daily    torsemide  20 mg Oral Daily    warfarin placeholder: dosing by pharmacy   Other RX Placeholder     PRN Meds: acetaminophen, ondansetron **OR** ondansetron, nitroGLYCERIN      Intake/Output Summary (Last 24 hours) at 5/25/2022 1237  Last data filed at 5/24/2022 2321  Gross per 24 hour   Intake 480 ml   Output 4800 ml   Net -4320 ml       Exam:    BP (!) 150/90   Pulse 73   Temp 97.5 °F (36.4 °C) (Oral)   Resp 17   Ht 6' 3\" (1.905 m)   Wt 240 lb 3.2 oz (109 kg)   SpO2 94%   BMI 30.02 kg/m²     GEN: Alert and oriented x3, resting comfortably, no sign of distress  HEENT: Normocephalic, atraumatic. VIKTOR, Neck supple. Resp: CTA b/l no wheezing or rhonchi. No respiratory distress  Cardio: RRR. No murmur  Abd: Soft, nondistended. NTTP.  BS present  Ext: Trace edema bilaterally, chronic venous stasis changes RLE  Skin is warm and dry        Labs:   Recent Labs     05/24/22  0015 05/25/22  0504   WBC 8.7 5.6   HGB 13.3* 13.3*   HCT 39.7* 40.2*    169     Recent Labs     05/24/22  0015 05/25/22  0504    144   K 4.1 3.8    105   CO2 26 27   BUN 14 17   CREATININE 0.86 0.99   CALCIUM 9.2 8.9     Recent Labs     05/24/22  0015   AST 18   ALT 16   BILITOT 0.8*   ALKPHOS 55     Recent Labs     05/24/22  0015 05/25/22  0504   INR 1.9 2.3     Recent Labs     05/24/22  0015 05/24/22  0015 05/24/22  0625 05/24/22  1217 05/24/22  1847   CKTOTAL 71 --   --   --   --    TROPONINI <0.010   < > <0.010 <0.010 <0.010    < > = values in this interval not displayed. Urinalysis:      Lab Results   Component Value Date    NITRU NEG 11/07/2011    BLOODU NEG 11/07/2011    SPECGRAV 1.014 11/07/2011    GLUCOSEU NEG 11/07/2011       Radiology:  CTA CHEST W WO CONTRAST   Final Result      No CT evidence for acute pulmonary embolism. Trace bilateral pleural effusions with associated opacities/atelectasis. XR CHEST PORTABLE   Final Result   The findings most likely reflect chronic congestive heart failure, CHF. .                   XR CHEST (2 VW)    (Results Pending)           Assessment/Plan:    Active Hospital Problems    Diagnosis Date Noted    COPD exacerbation ([facility] [address] [J44.1] 05/24/2022     Priority: Medium    Dyspnea on exertion [R06.00] 05/24/2022     Priority: Medium    Chest pain [R07.9] 05/24/2022     Priority: Medium        Chest pain  Acute on chronic systolic heart failure  Hypertension  -Pt currently denies CP, admits to exertional dyspnea and lower extremity edema  -Cardiology consulted and adjusting medications. Norvasc discontinued due to concern for lower extremity edema, ACE switch to ARB due to report of cough. -Consider isosorbide/hydralazine/spironolactone  -Echo pending  -IV Lasix x1, started on oral torsemide  -Diuresed well; net -4.7 L    Paroxysmal atrial fib anticoagulated on Coumadin  -Rate controlled, pharmacy for Coumadin dosing    CARO: CPAP  BPH Flomax  History of CVA with PFO  History of pulmonary embolism    Additional work up or/and treatment plan may be added today or then after based on clinical progression. I am managing a portion of pt care. Some medical issues are handled by other specialists. Additional work up and treatment should be done in out pt setting by pt PCP and other out pt providers. DVT prophylaxis: On coumadin  Dispo: Awaiting echo result, clinically improved.  Cardiology to see  Diet: ADULT DIET; Regular; Low Fat/Low Chol/High Fiber/2 gm Na;  Low Sodium (2 gm); 2000 ml; No Caffeine    Code Status: Full Code              Electronically signed by Norma Cavazos DO on 5/25/2022 at 12:37 PM

## 2022-05-25 NOTE — PROGRESS NOTES
[address] [facility] [place],7Th Floor Daily Progress Note  Name: Jorge A Matthews  Age: 80 y.o. Gender: male  CodeStatus: Full Code    Primary Cardiology  [address]   Primary Care Provider: Jefe Daniels MD  Admission Date: 5/23/2022     CARDIOLOGIST NOTE  I have personally performed a complete face to face history and physical on this patient. I have reviewed the notations as entered by NP Shira Wolfe I have personally  formulated the impression and plan on this patient and amended as necessary. Tim Woodall MD [facility] [place]    ECHO 5/24/22  Summary   Left ventricular ejection fraction is visually estimated at 45-50%. Severe hypokinesis distal inferior wall, distal septum and apex. Normal right ventricle structure and function. Normal right ventricle systolic pressure. Mildly dilated left atrium. Normal right atrium. Mild increase RAP   Normal mitral valve structure and function. Normal aortic valve structure   1-2+ AI due to mild aortic leaflet thickenng    Patient feels remarkably better after nearly 5 L of diuresis in the last 24 hours. Despite this degree of diuresis renal function and potassium remained stable on the current regimen. He notes less postnasal drip and dry cough since taken off of lisinopril tolerating valsartan well. Generally appears comfortable. Now has trace edema. 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 The worst wall motion abnormality is in the septum. It is possible this is pacemaker mediated cardiomyopathy as he is continuously RV apical pacing. He has follow-up with electrophysiology to consider upgrade to CRT device. However in that the entire ventricle is not hypokinetic and there is evidence of still inferior hypokinesis will obtain outpatient myocardial perfusion study to assure coronary disease is not contributing. May need coronary angiography prior to upgrade to CRT. He was just switched to valsartan has been on Toprol. As an outpatient would consider adding spironolactone and SGLT2 inhibitor depending upon whether or not ischemic disease is found and whether or not CRT improves LV function. Reasonable for discharge home today. Being arranged    Dago Lake MD        Chief complaint/Associated symptoms:   Paola Cheung was seen and examined at bedside     He is currently resting comfortably in bed without any complaints    States that his breathing has significantly improved    Denies any chest pain, palpitations, lightheadedness or dizziness. Diuresed 4800 ml 24 hrs      Assessment:  1. Shortness of breath: Improved. Chest x-ray showed findings most likely reflective of chronic congestive heart failure. States that his shortness of breath is chronic in nature but has been worsening over the last few days. States that he notices it most with exertion. Denies orthopnea.       2. Acute on chronic systolic CHF:  BNP 1458. LVEF 40% on heart cath 2015, LVEF Echo 50%   Pending  ECHO. Lasix 40 mg IV administered x 1. Currently on PO Demadex. Diuresed 4800 ml 24 hrs      3. Permanent atrial fibrillation: Managed with rate control strategy.      4. Hypertension:  Sub optimal control, Amlodipine currently on hold due to lower extremity edema. Valsartan started yesterday.         Plan:   1. Monitor on telemetry     2. Continue current medications      3. Further recommendations pending ECHO     4. Further recommendations per Dr. Zee Messina          Physical Exam  Constitutional:  Well developed, awake/alert/oriented x3, no distress, alert and cooperative. Respiratory/Thorax: Patent airways, CTAB,  normal breath sounds with good chest expansion, thorax symmetric. Cardiovascular: Regular, rate and rhythm, no murmurs,  normal S1 and S2, PMI non displaced.    Gastrointestinal:  Non distended, soft, non-tender, no rebound tenderness or guarding, Genitourinary:  deferred  Musculoskeletal:  No apparent injury. Extremities:  No cyanosis, right lower extremity edema,   Neurological:  Alert and oriented x3. Moves extremities spontaneous with purpose  Psychological:  Appropriate mood and behavior  Skin:  Warm and dry,  no lesions or rashes. Allergies: Adhesive Tape    Medications:  Reviewed  Home Medications    Infusion Medications:   Scheduled Medications:    guaiFENesin  600 mg Oral BID    rosuvastatin  20 mg Oral Nightly    aspirin  81 mg Oral Daily    metoprolol succinate  100 mg Oral Daily    tamsulosin  0.4 mg Oral Daily    magnesium oxide  400 mg Oral Daily    valsartan  160 mg Oral Daily    torsemide  20 mg Oral Daily    warfarin placeholder: dosing by pharmacy   Other RX Placeholder     PRN Meds: acetaminophen, ondansetron **OR** ondansetron, nitroGLYCERIN    Vitals  Vitals:    05/25/22 0802   BP: (!) 150/90   Pulse: 73   Resp: 17   Temp: 97.5 °F (36.4 °C)   SpO2: 94%       I&O  4800 ml 24 hrs     Telemetry:  Paced    ECHO: Pending      Labs:   Recent Labs     05/24/22  0015 05/25/22  0504   WBC 8.7 5.6   HGB 13.3* 13.3*   HCT 39.7* 40.2*    169     Recent Labs     05/24/22  0015 05/25/22  0504    144   K 4.1 3.8    105   CO2 26 27   BUN 14 17   CREATININE 0.86 0.99   CALCIUM 9.2 8.9     Recent Labs     05/24/22  0015   AST 18   ALT 16   BILITOT 0.8*   ALKPHOS 55     Recent Labs     05/24/22  0015 05/25/22  0504   INR 1.9 2.3     Recent Labs     05/24/22  0015 05/24/22  0015 05/24/22  0625 05/24/22  1217 05/24/22  1847   CKTOTAL 71  --   --   --   --    TROPONINI <0.010   < > <0.010 <0.010 <0.010    < > = values in this interval not displayed.        Urinalysis:   Lab Results   Component Value Date    NITRU NEG 11/07/2011    BLOODU NEG 11/07/2011    SPECGRAV 1.014 11/07/2011    GLUCOSEU NEG 11/07/2011       Radiology:   Portable: Results for orders placed during the hospital encounter of 05/23/22    XR CHEST PORTABLE    Narrative  XR CHEST PORTABLE: 5/24/2022 12:41 AM    CLINICAL HISTORY:  SOB .    COMPARISON: None available. TECHNIQUE:      FINDINGS:  There is a cardiac pacemaker on the left. The cardiac silhouette is at the upper limits of normal which may be secondary to cardiomegaly versus pericardial effusion. The aorta is ectatic which most commonly correlates with chronic hypertension. There are diffuse increased interstitial  pulmonary markings throughout both lungs which may be secondary to pulmonary edema. There is linear atelectasis at the periphery of the left lung. There are no effusions. Impression  The findings most likely reflect chronic congestive heart failure, CHF. Diane Garnett Active Hospital Problems    Diagnosis Date Noted    [facility] [J44.1] 05/24/2022     Priority: Medium    Dyspnea on exertion [R06.00] 05/24/2022     Priority: Medium    Chest pain [R07.9] 05/24/2022     Priority: Medium       Additional work up or/and treatment plan may be added today or then after based on clinical progression. I am managing a portion of pt care. Some medical issues are handled by other specialists. Additional work up and treatment should be done in out pt setting by pt PCP and other out pt providers. In addition to examining and evaluating pt, I spent additional time explaining care, normaland abnormal findings, and treatment plan. All of pt questions were answered. Counseling, diet and education were provided. Case will be discussed with nursing staff when appropriate. Family will be updated if and whenappropriate.       Electronically signed by JAMAAL Valle CNP on 5/25/2022 at 9:07 AM

## 2022-05-25 NOTE — PROGRESS NOTES
CLINICAL PHARMACY NOTE: MEDS TO BEDS    Total # of Prescriptions Filled: 2   The following medications were delivered to the patient:  · Torsemide 20 mg tab  · Valsartan 160 mg tab      Additional Documentation:

## 2022-05-27 ENCOUNTER — HOSPITAL ENCOUNTER (OUTPATIENT)
Dept: PHARMACY | Age: 82
Setting detail: THERAPIES SERIES
Discharge: HOME | End: 2022-05-27
Payer: MEDICARE

## 2022-05-27 DIAGNOSIS — I26.99 IATROGENIC PULMONARY EMBOLISM, SUBSEQUENT ENCOUNTER (HCC): Primary | ICD-10-CM

## 2022-05-27 DIAGNOSIS — T81.718D IATROGENIC PULMONARY EMBOLISM, SUBSEQUENT ENCOUNTER (HCC): Primary | ICD-10-CM

## 2022-05-27 LAB — INTERNATIONAL NORMALIZATION RATIO, POC: 2

## 2022-05-27 PROCEDURE — 99211 OFF/OP EST MAY X REQ PHY/QHP: CPT | Performed by: PHARMACIST

## 2022-05-27 PROCEDURE — 85610 PROTHROMBIN TIME: CPT | Performed by: PHARMACIST

## 2022-06-10 ENCOUNTER — HOSPITAL ENCOUNTER (OUTPATIENT)
Dept: PHARMACY | Age: 82
Setting detail: THERAPIES SERIES
Discharge: HOME | End: 2022-06-10
Payer: MEDICARE

## 2022-06-10 LAB — INTERNATIONAL NORMALIZATION RATIO, POC: 3.7

## 2022-06-10 PROCEDURE — 99211 OFF/OP EST MAY X REQ PHY/QHP: CPT

## 2022-06-10 PROCEDURE — 85610 PROTHROMBIN TIME: CPT

## 2022-06-10 NOTE — PROGRESS NOTES
Mr. Julius Nina is a 80 y.o. y/o male with history of PE who presents today for anticoagulation monitoring and adjustment. INR 3.7 is supra-therapeutic for this patient (goal range 2-3) and is reflective of 35 mg TWD. Dose was not reduced to 2.5mg on mondays has been taking 5mg daily since last visit   Patient verifies current dosing regimen, patient able to verbally recall dose  Patient reports 0  missed doses since last INR   Patient denies s/sx clotting and/or stroke  Patient denies hematuria, epistaxis, rectal bleeding  Patient denies changes in diet, alcohol, or tobacco use  Reviewed medication list and drug allergies with patient, updated any medication additions or modifications accordingly  With torsemide reduction of 5-18% recommended. Likely source of elevation since dose was not reduced following instructions of last visit. Patient also denies any pending medical or dental procedures scheduled at this time  Possible defib placement-will let us know following follow up with dr. Carlos Torres.    Patient was instructed to Hold x today then reduce TWD to 32.5mg and RTC 2 weeks      [facility] [place] PharmD

## 2022-06-24 ENCOUNTER — HOSPITAL ENCOUNTER (OUTPATIENT)
Dept: PHARMACY | Age: 82
Setting detail: THERAPIES SERIES
Discharge: HOME | End: 2022-06-24
Payer: MEDICARE

## 2022-06-24 DIAGNOSIS — T81.718D IATROGENIC PULMONARY EMBOLISM, SUBSEQUENT ENCOUNTER (HCC): Primary | ICD-10-CM

## 2022-06-24 DIAGNOSIS — I26.99 IATROGENIC PULMONARY EMBOLISM, SUBSEQUENT ENCOUNTER (HCC): Primary | ICD-10-CM

## 2022-06-24 LAB — INTERNATIONAL NORMALIZATION RATIO, POC: 2.5

## 2022-06-24 PROCEDURE — 85610 PROTHROMBIN TIME: CPT

## 2022-06-24 PROCEDURE — 99211 OFF/OP EST MAY X REQ PHY/QHP: CPT

## 2022-06-24 NOTE — PROGRESS NOTES
Mr. Antonio Manjarrez is a 80 y.o. y/o male with history of PE who presents today for anticoagulation monitoring and adjustment. Face to face visit completed, procedural mask worn by myself as instructed; procedural mask worn by patient, room cleaned pre and post visit with Virex Plus spray and/anti-viral wipe.      INR 2.5 is therapeutic for this patient (goal range 2-3) and is reflective of 32.5 mg TWD  Patient verifies current dosing regimen, patient able to verbally recall dose  Patient reports 0 missed doses since last INR   Patient denies s/sx clotting and/or stroke  Patient denies hematuria, epistaxis, rectal bleeding  Patient denies changes in diet, alcohol, or tobacco use  Reviewed medication list and drug allergies with patient, updated any medication additions or modifications accordingly  Patient also denies any pending medical or dental procedures scheduled at this time  Patient was instructed to continue with current therapy of warfarin 32.5mg TWD and RTC 4 weeks- patient usually a 6 week appointment slot     For Pharmacy Admin Tracking Only     Intervention Detail: Adherence Monitorin and Lab(s) Ordered   Total # of Interventions Recommended: 2   Total # of Interventions Accepted: 2   Time Spent (min): 10      Ashley MakD, BCPS   2022 7:55 AM

## 2022-07-22 ENCOUNTER — HOSPITAL ENCOUNTER (OUTPATIENT)
Dept: PHARMACY | Age: 82
Setting detail: THERAPIES SERIES
Discharge: HOME | End: 2022-07-22
Payer: MEDICARE

## 2022-07-22 LAB — INTERNATIONAL NORMALIZATION RATIO, POC: 3.2

## 2022-07-22 PROCEDURE — 85610 PROTHROMBIN TIME: CPT

## 2022-07-22 PROCEDURE — 99211 OFF/OP EST MAY X REQ PHY/QHP: CPT

## 2022-07-22 NOTE — PROGRESS NOTES
Mr. Mahesh Ricketts is a 80 y.o. y/o male with history of PE who presents today for anticoagulation monitoring and adjustment. Due to COVID protocol, extensive cleaning with Virex performed before and after visit. Masks worn by both parties the entire visit.      INR 3.2 is sl supra-therapeutic for this patient (goal range 2-3) and is reflective of 32.5 mg TWD  Patient verifies current dosing regimen, patient able to verbally recall dose  Patient reports 0  missed doses since last INR   Patient denies s/sx clotting and/or stroke  Patient denies hematuria, epistaxis, rectal bleeding  Patient denies changes in diet, alcohol, or tobacco use  Reviewed medication list and drug allergies with patient, updated any medication additions or modifications accordingly  Patient also denies any pending medical or dental procedures scheduled at this time  Patient was instructed to take partial dose today & continue 32.5 mg TWD and RTC 6 weeks    For Pharmacy Admin Tracking Only    Intervention Detail: Adherence Monitorin  Total # of Interventions Recommended: 0  Total # of Interventions Accepted: 0  Time Spent (min): 15

## 2022-08-24 ENCOUNTER — HOSPITAL ENCOUNTER (OUTPATIENT)
Dept: CARDIOLOGY | Age: 82
Discharge: HOME | End: 2022-08-24
Payer: MEDICARE

## 2022-08-24 PROCEDURE — 93280 PM DEVICE PROGR EVAL DUAL: CPT

## 2022-09-02 ENCOUNTER — HOSPITAL ENCOUNTER (OUTPATIENT)
Dept: PHARMACY | Age: 82
Setting detail: THERAPIES SERIES
Discharge: HOME | End: 2022-09-02
Payer: MEDICARE

## 2022-09-02 DIAGNOSIS — T81.718D IATROGENIC PULMONARY EMBOLISM, SUBSEQUENT ENCOUNTER (HCC): Primary | ICD-10-CM

## 2022-09-02 DIAGNOSIS — I26.99 IATROGENIC PULMONARY EMBOLISM, SUBSEQUENT ENCOUNTER (HCC): Primary | ICD-10-CM

## 2022-09-02 LAB — INTERNATIONAL NORMALIZATION RATIO, POC: 2.2

## 2022-09-02 PROCEDURE — 85610 PROTHROMBIN TIME: CPT

## 2022-09-02 PROCEDURE — 99211 OFF/OP EST MAY X REQ PHY/QHP: CPT

## 2022-09-30 ENCOUNTER — HOSPITAL ENCOUNTER (OUTPATIENT)
Dept: SLEEP CENTER | Age: 82
Discharge: HOME | End: 2022-10-02
Payer: MEDICARE

## 2022-09-30 PROCEDURE — 95806 SLEEP STUDY UNATT&RESP EFFT: CPT

## 2022-10-05 PROCEDURE — 95806 SLEEP STUDY UNATT&RESP EFFT: CPT | Performed by: INTERNAL MEDICINE

## 2022-10-14 ENCOUNTER — HOSPITAL ENCOUNTER (OUTPATIENT)
Dept: PHARMACY | Age: 82
Setting detail: THERAPIES SERIES
Discharge: HOME | End: 2022-10-14
Payer: MEDICARE

## 2022-10-14 DIAGNOSIS — T81.718A IATROGENIC PULMONARY EMBOLISM, INITIAL ENCOUNTER (HCC): Primary | ICD-10-CM

## 2022-10-14 DIAGNOSIS — I26.99 IATROGENIC PULMONARY EMBOLISM, INITIAL ENCOUNTER (HCC): Primary | ICD-10-CM

## 2022-10-14 LAB — INTERNATIONAL NORMALIZATION RATIO, POC: 2.2

## 2022-10-14 PROCEDURE — 85610 PROTHROMBIN TIME: CPT | Performed by: PHARMACIST

## 2022-10-14 PROCEDURE — 99211 OFF/OP EST MAY X REQ PHY/QHP: CPT | Performed by: PHARMACIST

## 2022-10-14 RX ORDER — WARFARIN SODIUM 5 MG/1
TABLET ORAL
Qty: 100 TABLET | Refills: 1 | Status: SHIPPED | OUTPATIENT
Start: 2022-10-14

## 2022-10-14 NOTE — PROGRESS NOTES
Mr. Magda Hills is a 80 y.o. male with history of PE who presents today for anticoagulation monitoring and adjustment. Face to face visit completed, procedural mask worn by myself as instructed: Mask worn by patient, room cleaned pre and post visit with Virex Plus spray    INR 2.2 is therapeutic for this patient (goal range 2-3) and is reflective of 32.5 mg TWD  Patient's current dosing schedule is 32.5 TWD  Patient verifies current dosing regimen, patient able to verbally recall dose    Patient reports no missed doses in the last seven days     Patient denies s/sx clotting and/or stroke  Patient denies hematuria, epistaxis, rectal bleeding  Patient denies changes in diet  Patient denies changes in alcohol use  Patient denies changes in tobacco use    Discussed current medications and drug allergies with patient, updated any medication additions or modifications accordingly    Patient denies any pending medical procedures scheduled at this time  Patient denies any pending dental procedures scheduled at this time    Patient was instructed to continue 32.5mg TWD and RTC in 7 weeks  Patient usually returns in 6 weeks but due to Thanksgiving Day holiday he will be RTC in 7 weeks. For Pharmacy Admin Tracking Only    Intervention Detail: Adherence Monitorin  Total # of Interventions Recommended: 1  Total # of Interventions Accepted: 1  Time Spent (min): 30    Black Johnson, CAIN. Ph. 10/14/2022 8:22 AM

## 2022-10-23 PROBLEM — G47.33 OSA (OBSTRUCTIVE SLEEP APNEA): Status: ACTIVE | Noted: 2018-04-24

## 2022-11-09 ENCOUNTER — OFFICE VISIT (OUTPATIENT)
Dept: PULMONOLOGY | Age: 82
End: 2022-11-09
Payer: MEDICARE

## 2022-11-09 VITALS
TEMPERATURE: 97.4 F | WEIGHT: 240 LBS | HEART RATE: 88 BPM | SYSTOLIC BLOOD PRESSURE: 152 MMHG | DIASTOLIC BLOOD PRESSURE: 88 MMHG | OXYGEN SATURATION: 94 % | HEIGHT: 75 IN | BODY MASS INDEX: 29.84 KG/M2

## 2022-11-09 DIAGNOSIS — R06.02 SHORTNESS OF BREATH: ICD-10-CM

## 2022-11-09 DIAGNOSIS — I10 HYPERTENSION, UNSPECIFIED TYPE: ICD-10-CM

## 2022-11-09 DIAGNOSIS — G47.33 OSA (OBSTRUCTIVE SLEEP APNEA): Primary | ICD-10-CM

## 2022-11-09 PROCEDURE — 1036F TOBACCO NON-USER: CPT | Performed by: INTERNAL MEDICINE

## 2022-11-09 PROCEDURE — 3078F DIAST BP <80 MM HG: CPT | Performed by: INTERNAL MEDICINE

## 2022-11-09 PROCEDURE — G8417 CALC BMI ABV UP PARAM F/U: HCPCS | Performed by: INTERNAL MEDICINE

## 2022-11-09 PROCEDURE — 99203 OFFICE O/P NEW LOW 30 MIN: CPT | Performed by: INTERNAL MEDICINE

## 2022-11-09 PROCEDURE — G8484 FLU IMMUNIZE NO ADMIN: HCPCS | Performed by: INTERNAL MEDICINE

## 2022-11-09 PROCEDURE — 3074F SYST BP LT 130 MM HG: CPT | Performed by: INTERNAL MEDICINE

## 2022-11-09 PROCEDURE — 1123F ACP DISCUSS/DSCN MKR DOCD: CPT | Performed by: INTERNAL MEDICINE

## 2022-11-09 PROCEDURE — G8428 CUR MEDS NOT DOCUMENT: HCPCS | Performed by: INTERNAL MEDICINE

## 2022-11-09 NOTE — PROGRESS NOTES
NEW PATIENT VISIT-PULMONARY/SLEEP    11/9/2022     REFERRING PHYSICIAN:  Ron Spencer MD     REASON FOR REFERRAL:  CARO    HPI:     Tash Rivera is a 80 y.o. male who was referred to sleep and pulmonary clinic for evaluation. He is known to have history of coronary artery disease, A. fib, hypertension and pulmonary emboli. He recently had a sleep study which I reviewed personally and interpreted the results independently. He had a mild case of sleep apnea with an overall AHI of 12. There was moderate nocturnal desaturations as well. He had a machine for the last 5 years. However, his machine is on recall. He has not been using it for few months. He has been waiting for [facility] to get him a new machine but this has been taken longer than he expected. He is only using oxygen for now. He started and sleepy during the day and he dozes off easily. Weight has been stable overall. Has some mild shortness of breath with activities. Past Medical History   No past medical history on file. Past Surgical History  No past surgical history on file. Allergies  Allergies   Allergen Reactions    Adhesive Tape Rash       Medications  Current Outpatient Medications   Medication Sig Dispense Refill    warfarin (COUMADIN) 5 MG tablet Take as directed by [facility] [place] [facility]. Quantity equals 90 day supply.  100 tablet 1    valsartan (DIOVAN) 160 mg tablet Take 1 tablet by mouth daily 30 tablet 3    torsemide (DEMADEX) 20 MG tablet Take 1 tablet by mouth daily 30 tablet 3    metoprolol succinate (TOPROL XL) 100 MG extended release tablet Take 1 tablet by mouth daily 30 tablet 3    TROSPIUM CHLORIDE ER PO Take 20 mg by mouth daily      aspirin 81 MG tablet Take 81 mg by mouth daily      vitamin C (ASCORBIC ACID) 500 MG tablet Take 1,000 mg by mouth See Admin Instructions Take 1 tablet 2-3 times a week      Cholecalciferol (VITAMIN D3) 5000 units TABS Take 1 tablet by mouth every other day       vitamin E 400 UNIT capsule Take 400 Units by mouth every other day      Krill Oil 1000 MG CAPS Take 1 capsule by mouth See Admin Instructions Take 1 Tablet 2-3 times a week (Patient not taking: Reported on 5/24/2022)      Coenzyme Q10 30 MG/5ML LIQD Take 10 mLs by mouth See Admin Instructions Take 2 teaspoons twice per week (Patient not taking: Reported on 5/24/2022)      magnesium oxide (MAG-OX) 400 MG tablet Take 400 mg by mouth daily      Misc Natural Products (TURMERIC CURCUMIN) CAPS Take 1 capsule by mouth See Admin Instructions Take 1 capsule three days a week (Patient not taking: Reported on 5/24/2022)      tamsulosin (FLOMAX) 0.4 MG capsule Take 0.4 mg by mouth daily      atorvastatin (LIPITOR) 10 MG tablet Take 5 mg by mouth daily        No current facility-administered medications for this visit. Social History  Social History     Tobacco Use    Smoking status: Never    Smokeless tobacco: Never   Substance Use Topics    Alcohol use: Yes     Comment: occ       Family History  Non pertinent. Review of Systems  All review of systems has been obtained and negative other than what was mentionedin HPI. Physical Exam                 Vitals:    11/09/22 0918   BP: (!) 152/88   Pulse: 88   Temp: 97.4 °F (36.3 °C)   TempSrc: Tympanic   SpO2: 94%   Weight: 240 lb (108.9 kg)   Height: 6' 3\" (1.905 m)          General appearance: Well appearing. No acute distress. AAOX3  Head: Normocephalic, without obvious abnormality, atraumatic   Eyes:Pupils bilateral equal and reactive, EOM intact. Normal sclera and conjunctiva   Nose: Mucosa pink  Throat: Clear,  [place] 3  Neck: Supple, No JVD. Nothyromegaly. Neck is thin  Lungs: Clear bilaterally. No wheezing. No crackles. No use of accessory muscles. Heart: RRR, S1, S2 normal, no murmur, click, rub or gallop   Abdomen: soft, non-tender, nondistended. Bowel sounds normal. No hernia. No organomegaly.    Extremities: extremities normal, atraumatic, no cyanosis, no edema  Skin: Skin color, texture, turgor normal. No rashes or lesions   Neurological: No focal deficits,cranial nerves grossly intact. No weakness. Sensation normal   Psych: Normal Mood  Musculoskeletal: No joint abnormalities. Impression:   Diagnosis Orders   1. CARO (obstructive sleep apnea)  DME Order for CPAP as OP      2. Shortness of breath        3. Hypertension, unspecified type              Orders Placed This Encounter   Procedures    DME Order for CPAP as OP     CPAP Auto Adjust 5  - 20 cm H2O with 2L O2 bleed    Heated Humidifier    Tubing 1 per 3 months    Full Face Mask 1 per 3 months and Cushions/Seals 1 per month    Headgear 1 per 6 months, Chin Strap 1 per 6 months, Disposable Filters 2 per month, Non-disposable Filters 1 per 6 months, Chambers 1 per 6 months and Other Related Supplies. Replace supplies and accessories as needed. Patient may choose another interface for compliance and comfort. Comments:  Diagnosis: CARO  Length of need: Lifetime           Recommendations:     -Continues to have mild case of sleep apnea. He has moderate sleep related hypoxia. He should be on treatment.  -I will order an AutoPap with a minimum pressure of 5 and a maximum pressure 20 cm H2O. Oxygen bleed in at 2 L.  -Advised to use the machine on average 6 hours every night.  -He will need to see me 4 to 6 weeks after being on the machine.  -No driving if sleepy or drowsy or otherwise impaired.  -Weight loss and exercise has been advised. Return in about 3 months (around 2/9/2023).        Electronically signed by Susan Stewart MD on 11/9/2022 at 11:09 AM

## 2022-11-23 ENCOUNTER — HOSPITAL ENCOUNTER (OUTPATIENT)
Dept: CARDIOLOGY | Age: 82
Discharge: HOME | End: 2022-11-23
Payer: MEDICARE

## 2022-11-23 PROCEDURE — 93296 REM INTERROG EVL PM/IDS: CPT

## 2022-12-02 ENCOUNTER — HOSPITAL ENCOUNTER (OUTPATIENT)
Dept: PHARMACY | Age: 82
Setting detail: THERAPIES SERIES
Discharge: HOME | End: 2022-12-02
Payer: MEDICARE

## 2022-12-02 DIAGNOSIS — I26.99 IATROGENIC PULMONARY EMBOLISM, INITIAL ENCOUNTER (HCC): Primary | ICD-10-CM

## 2022-12-02 DIAGNOSIS — T81.718A IATROGENIC PULMONARY EMBOLISM, INITIAL ENCOUNTER (HCC): Primary | ICD-10-CM

## 2022-12-02 LAB — INTERNATIONAL NORMALIZATION RATIO, POC: 2.3

## 2022-12-02 PROCEDURE — 85610 PROTHROMBIN TIME: CPT | Performed by: PHARMACIST

## 2022-12-02 PROCEDURE — 99211 OFF/OP EST MAY X REQ PHY/QHP: CPT | Performed by: PHARMACIST

## 2022-12-02 NOTE — PROGRESS NOTES
Mr. Ryanne Lenz is a 80 y.o. y/o male with history of PE who presents today for anticoagulation monitoring and adjustment. INR 2.3 is therapeutic for this patient (goal range 2-3) and is reflective of 32.5 mg TWD  Patient verifies current dosing regimen, patient able to verbally recall dose  Patient reports one  missed doses since last INR   Patient denies s/sx clotting and/or stroke  Patient denies hematuria, epistaxis, rectal bleeding  Patient denies changes in diet, alcohol, or tobacco use    Reviewed medication list and drug allergies with patient. Patient states physician ordered Torsemide 20mg daily.   Updated any medication additions or modifications accordingly    Patient also denies any pending medical or dental procedures scheduled at this time  Patient was instructed to maintain current regimen of 32.5mg and RTC 6 weeks    For Pharmacy Admin Tracking Only    Intervention Detail: Adherence Monitorin  Total # of Interventions Recommended: 1  Total # of Interventions Accepted: 1  Time Spent (min): 95 [place]  22 2254

## 2023-01-13 ENCOUNTER — HOSPITAL ENCOUNTER (OUTPATIENT)
Dept: PHARMACY | Age: 83
Setting detail: THERAPIES SERIES
Discharge: HOME | End: 2023-01-13
Payer: MEDICARE

## 2023-01-13 DIAGNOSIS — I26.99 IATROGENIC PULMONARY EMBOLISM, SUBSEQUENT ENCOUNTER (HCC): Primary | ICD-10-CM

## 2023-01-13 DIAGNOSIS — T81.718D IATROGENIC PULMONARY EMBOLISM, SUBSEQUENT ENCOUNTER (HCC): Primary | ICD-10-CM

## 2023-01-13 LAB — INTERNATIONAL NORMALIZATION RATIO, POC: 2.5

## 2023-01-13 PROCEDURE — 85610 PROTHROMBIN TIME: CPT | Performed by: PHARMACIST

## 2023-01-13 PROCEDURE — 99211 OFF/OP EST MAY X REQ PHY/QHP: CPT | Performed by: PHARMACIST

## 2023-01-13 NOTE — PROGRESS NOTES
Mr. Aliza Quintero is a 80 y.o. y/o male with history of PE who presents today for anticoagulation monitoring and adjustment.   INR 2.5 is therapeutic for this patient (goal range 2-3) and is reflective of 32.5 mg TWD  Patient verifies current dosing regimen, patient able to verbally recall dose  Patient reports no  missed doses since last INR   Patient denies s/sx clotting and/or stroke  Patient denies hematuria, epistaxis, rectal bleeding  Patient denies changes in diet, alcohol, or tobacco use  Reviewed medication list and drug allergies with patient, updated any medication additions or modifications accordingly  Patient also denies any pending medical or dental procedures scheduled at this time  Patient was instructed to continue 32.5mg TWD and RTC 6 weeks      For Pharmacy Admin Tracking Only    Intervention Detail: Adherence Monitorin  Total # of Interventions Recommended: 1  Total # of Interventions Accepted: 1  Time Spent (min): 30

## 2023-01-19 LAB
ALBUMIN: 4.2 G/DL (ref 3.4–5)
ALP BLD-CCNC: 46 U/L (ref 33–136)
ALT SERPL-CCNC: 19 U/L (ref 10–52)
ANION GAP SERPL CALCULATED.3IONS-SCNC: 10 MMOL/L (ref 10–20)
AST SERPL-CCNC: 24 U/L (ref 9–39)
BICARBONATE: 33 MMOL/L (ref 21–32)
BILIRUB SERPL-MCNC: 1.1 MG/DL (ref 0–1.2)
CALCIUM SERPL-MCNC: 9.2 MG/DL (ref 8.6–10.3)
CHLORIDE BLD-SCNC: 101 MMOL/L (ref 98–107)
CHOLESTEROL/HDL RATIO: 4.6
CHOLESTEROL: 182 MG/DL (ref 0–199)
CREAT SERPL-MCNC: 1.11 MG/DL (ref 0.5–1.3)
EGFR MALE: 66 ML/MIN/1.73M2
ERYTHROCYTE [DISTWIDTH] IN BLOOD BY AUTOMATED COUNT: 13.9 % (ref 11.5–14)
GLUCOSE: 102 MG/DL (ref 74–99)
HCT VFR BLD CALC: 41.5 % (ref 41–52)
HDLC SERPL-MCNC: 39.7 MG/DL
HEMOGLOBIN: 13.5 G/DL (ref 13.5–17)
LDL CHOLESTEROL: 113 MG/DL (ref 0–99)
MCHC RBC AUTO-ENTMCNC: 32.5 G/DL (ref 32–36)
MCV RBC AUTO: 89 FL (ref 80–100)
PLATELET # BLD: 184 X10E9/L (ref 150–450)
POTASSIUM SERPL-SCNC: 4.1 MMOL/L (ref 3.5–5.3)
RBC # BLD: 4.66 X10E12/L (ref 4.5–5.9)
SODIUM BLD-SCNC: 140 MMOL/L (ref 136–145)
TOTAL PROTEIN: 7 G/DL (ref 6.4–8.2)
TRIGL SERPL-MCNC: 148 MG/DL (ref 0–149)
UREA NITROGEN: 17 MG/DL (ref 6–23)
VLDLC SERPL CALC-MCNC: 30 MG/DL (ref 0–40)
WBC: 5.1 X10E9/L (ref 4.4–11.3)

## 2023-02-13 ENCOUNTER — OFFICE VISIT (OUTPATIENT)
Dept: PULMONOLOGY | Age: 83
End: 2023-02-13
Payer: MEDICARE

## 2023-02-13 VITALS
HEART RATE: 84 BPM | SYSTOLIC BLOOD PRESSURE: 138 MMHG | WEIGHT: 239 LBS | TEMPERATURE: 98.6 F | BODY MASS INDEX: 29.72 KG/M2 | DIASTOLIC BLOOD PRESSURE: 84 MMHG | OXYGEN SATURATION: 97 % | HEIGHT: 75 IN

## 2023-02-13 DIAGNOSIS — G47.33 OSA ON CPAP: Primary | ICD-10-CM

## 2023-02-13 DIAGNOSIS — Z99.89 OSA ON CPAP: Primary | ICD-10-CM

## 2023-02-13 DIAGNOSIS — G47.34 SLEEP RELATED HYPOXIA: ICD-10-CM

## 2023-02-13 DIAGNOSIS — I10 HYPERTENSION, UNSPECIFIED TYPE: ICD-10-CM

## 2023-02-13 PROCEDURE — G8427 DOCREV CUR MEDS BY ELIG CLIN: HCPCS | Performed by: INTERNAL MEDICINE

## 2023-02-13 PROCEDURE — G8484 FLU IMMUNIZE NO ADMIN: HCPCS | Performed by: INTERNAL MEDICINE

## 2023-02-13 PROCEDURE — 1123F ACP DISCUSS/DSCN MKR DOCD: CPT | Performed by: INTERNAL MEDICINE

## 2023-02-13 PROCEDURE — 99213 OFFICE O/P EST LOW 20 MIN: CPT | Performed by: INTERNAL MEDICINE

## 2023-02-13 PROCEDURE — 3079F DIAST BP 80-89 MM HG: CPT | Performed by: INTERNAL MEDICINE

## 2023-02-13 PROCEDURE — 1036F TOBACCO NON-USER: CPT | Performed by: INTERNAL MEDICINE

## 2023-02-13 PROCEDURE — 3075F SYST BP GE 130 - 139MM HG: CPT | Performed by: INTERNAL MEDICINE

## 2023-02-13 PROCEDURE — G8417 CALC BMI ABV UP PARAM F/U: HCPCS | Performed by: INTERNAL MEDICINE

## 2023-02-13 RX ORDER — VALSARTAN AND HYDROCHLOROTHIAZIDE 160; 25 MG/1; MG/1
TABLET ORAL
COMMUNITY
Start: 2023-01-26

## 2023-02-13 NOTE — PROGRESS NOTES
PATIENT VISIT-PULMONARY/SLEEP    2/13/2023     REFERRING PHYSICIAN:  Dell Dickerson MD     REASON FOR REFERRAL:  CARO    HPI:     Dhara Montoya is a 80 y.o. male who was referred to sleep and pulmonary clinic for evaluation. He is known to have history of coronary artery disease, A. fib, hypertension and pulmonary emboli. He recently had a sleep study which I reviewed personally and interpreted the results independently. He had a mild case of sleep apnea with an overall AHI of 12. There was moderate nocturnal desaturations as well. He had a machine for the last 5 years. However, his machine is on recall. He has not been using it for few months. He has been waiting for [facility] to get him a new machine but this has been taken longer than he expected. He is only using oxygen for now. He started and sleepy during the day and he dozes off easily. Weight has been stable overall. Has some mild shortness of breath with activities. 2/13/23:    He comes for follow up. He was prescribed AutoPAP, min pressure of 5, max pressure 10 CM H2O. compliance has been good. He uses the machine regularly every night. He is averaging about 6 hours. He feels more refreshed. He feels that he has more energy. His sleep is resting. Denies any issues with the mask of the machine or the pressure. Past Medical History   No past medical history on file. Past Surgical History  No past surgical history on file. Allergies  Allergies   Allergen Reactions    Adhesive Tape Rash       Medications  Current Outpatient Medications   Medication Sig Dispense Refill    warfarin (COUMADIN) 5 MG tablet Take as directed by [facility]. Quantity equals 90 day supply.  100 tablet 1    torsemide (DEMADEX) 20 MG tablet Take 1 tablet by mouth daily 30 tablet 3    metoprolol succinate (TOPROL XL) 100 MG extended release tablet Take 1 tablet by mouth daily 30 tablet 3 TROSPIUM CHLORIDE ER PO Take 20 mg by mouth daily      aspirin 81 MG tablet Take 81 mg by mouth daily      vitamin C (ASCORBIC ACID) 500 MG tablet Take 1,000 mg by mouth See Admin Instructions Take 1 tablet 2-3 times a week      Cholecalciferol (VITAMIN D3) 5000 units TABS Take 1 tablet by mouth every other day       vitamin E 400 UNIT capsule Take 400 Units by mouth every other day      Krill Oil 1000 MG CAPS Take 1 capsule by mouth See Admin Instructions Take 1 Tablet 2-3 times a week      Coenzyme Q10 30 MG/5ML LIQD Take 10 mLs by mouth See Admin Instructions Take 2 teaspoons twice per week      magnesium oxide (MAG-OX) 400 MG tablet Take 400 mg by mouth daily      Misc Natural Products (TURMERIC CURCUMIN) CAPS Take 1 capsule by mouth See Admin Instructions Take 1 capsule three days a week      tamsulosin (FLOMAX) 0.4 MG capsule Take 0.4 mg by mouth daily      atorvastatin (LIPITOR) 10 MG tablet Take 5 mg by mouth daily       valsartan-hydroCHLOROthiazide (DIOVAN-HCT) 160-25 MG per tablet TAKE 1 TABLET BY MOUTH EVERY DAY      valsartan (DIOVAN) 160 mg tablet Take 1 tablet by mouth daily (Patient not taking: Reported on 2/13/2023) 30 tablet 3     No current facility-administered medications for this visit. Social History  Social History     Tobacco Use    Smoking status: Never    Smokeless tobacco: Never   Substance Use Topics    Alcohol use: Yes     Comment: occ       Family History  Non pertinent. Review of Systems  All review of systems has been obtained and negative other than what was mentionedin HPI. Physical Exam                 Vitals:    02/13/23 0837   BP: 138/84   Pulse: 84   Temp: 98.6 °F (37 °C)   TempSrc: Tympanic   SpO2: 97%   Weight: 239 lb (108.4 kg)   Height: 6' 3\" (1.905 m)          General appearance: Well appearing. No acute distress. AAOX3  Head: Normocephalic, without obvious abnormality, atraumatic   Eyes:Pupils bilateral equal and reactive, EOM intact.  Normal sclera and conjunctiva   Nose: Mucosa pink  Throat: Clear,  [place] 3  Neck: Supple, No JVD. Nothyromegaly. Neck is thin  Lungs: Clear bilaterally. No wheezing. No crackles. No use of accessory muscles. Heart: RRR, S1, S2 normal, no murmur, click, rub or gallop   Abdomen: soft, non-tender, nondistended. Bowel sounds normal. No hernia. No organomegaly. Extremities: extremities normal, atraumatic, no cyanosis, no edema  Skin: Skin color, texture, turgor normal. No rashes or lesions   Neurological: No focal deficits,cranial nerves grossly intact. No weakness. Sensation normal   Psych: Normal Mood  Musculoskeletal: No joint abnormalities. Impression:   Diagnosis Orders   1. CARO on CPAP        2. Hypertension, unspecified type        3. Sleep related hypoxia              No orders of the defined types were placed in this encounter. Recommendations:     -Continues using AutoPap at the same setting, minimum pressure of 5 and a maximum pressure 20 cm H2O. Oxygen bleed in at 2 L.  -He is due for a new mask.  -We will obtain compliance data from [facility].  -No driving if sleepy or drowsy or otherwise impaired.  -Weight loss and exercise has been advised. Return in about 1 year (around 2/13/2024).        Electronically signed by David Kelley MD on 2/13/2023 at 9:01 AM

## 2023-02-23 ENCOUNTER — HOSPITAL ENCOUNTER (OUTPATIENT)
Dept: CARDIOLOGY | Age: 83
Discharge: HOME | End: 2023-02-23
Payer: MEDICARE

## 2023-02-23 PROCEDURE — 93280 PM DEVICE PROGR EVAL DUAL: CPT

## 2023-02-24 ENCOUNTER — HOSPITAL ENCOUNTER (OUTPATIENT)
Dept: PHARMACY | Age: 83
Setting detail: THERAPIES SERIES
Discharge: HOME | End: 2023-02-24
Payer: MEDICARE

## 2023-02-24 DIAGNOSIS — T81.718D IATROGENIC PULMONARY EMBOLISM, SUBSEQUENT ENCOUNTER (HCC): Primary | ICD-10-CM

## 2023-02-24 DIAGNOSIS — I26.99 IATROGENIC PULMONARY EMBOLISM, SUBSEQUENT ENCOUNTER (HCC): Primary | ICD-10-CM

## 2023-02-24 LAB — INTERNATIONAL NORMALIZATION RATIO, POC: 1.9

## 2023-02-24 PROCEDURE — 99211 OFF/OP EST MAY X REQ PHY/QHP: CPT

## 2023-02-24 PROCEDURE — 85610 PROTHROMBIN TIME: CPT

## 2023-02-24 NOTE — PROGRESS NOTES
Mr. Kennedy Chew is a 80 y.o. y/o male with history of PE who presents today for anticoagulation monitoring and adjustment. INR 1.9 is subtherapeutic for this patient (goal range 2.0-3.0) and is reflective of 32.5 mg TWD  Patient verifies current dosing regimen, patient able to verbally recall dose  Patient reports 1  missed doses since last INR   Patient denies s/sx clotting and/or stroke  Patient denies hematuria, epistaxis, rectal bleeding  Patient denies changes in diet, alcohol, or tobacco use  Reviewed medication list and drug allergies with patient, updated any medication additions or modifications accordingly  Patient also denies any pending medical or dental procedures scheduled at this time  Patient has been therapeutic every visit dating back to 2022. INR just slightly subtherapeutic today at 1.9. Patient stated he missed a dose earlier this week. Will not boost as INR is 0.1 under therapeutic.  Patient was instructed to continue current regimen of 2.5mg Wed, 5mg all other days and RTC 6 weeks    For Pharmacy Admin Tracking Only    Intervention Detail: Adherence Monitorin and Lab(s) Ordered  Total # of Interventions Recommended: 1  Total # of Interventions Accepted: 1  Time Spent (min): 15    Dago Dela Cruz PharmD   2023 8:03 AM

## 2023-04-07 ENCOUNTER — HOSPITAL ENCOUNTER (OUTPATIENT)
Dept: PHARMACY | Age: 83
Setting detail: THERAPIES SERIES
Discharge: HOME | End: 2023-04-07
Payer: MEDICARE

## 2023-04-07 DIAGNOSIS — T81.718S: Primary | ICD-10-CM

## 2023-04-07 DIAGNOSIS — I26.99: Primary | ICD-10-CM

## 2023-04-07 LAB — INTERNATIONAL NORMALIZATION RATIO, POC: 1.8

## 2023-04-07 PROCEDURE — 99211 OFF/OP EST MAY X REQ PHY/QHP: CPT

## 2023-04-07 PROCEDURE — 85610 PROTHROMBIN TIME: CPT

## 2023-04-07 NOTE — PROGRESS NOTES
Mr. Geovanna Alexis is a 80 y.o. y/o male with history of PE who presents today for anticoagulation monitoring and adjustment. INR 1.8 is sub-therapeutic for this patient (goal range 2-3) and is reflective of 32.5 mg TWD  Patient verifies current dosing regimen, patient able to verbally recall dose  Patient reports 0 missed doses since last INR   Patient denies s/sx clotting and/or stroke  Patient denies hematuria, epistaxis, rectal bleeding  Patient denies changes in diet, alcohol, or tobacco use  Reviewed medication list and drug allergies with patient, updated any medication additions or modifications accordingly  Pt started brain vitamins 3 weeks ago. Which may be cause for the INR shift.   Patient also denies any pending medical or dental procedures scheduled at this time  Patient was instructed to increase to 35 mg TWD and RTC 6 weeks    For Pharmacy Admin Tracking Only    Intervention Detail: Adherence Monitorin  Total # of Interventions Recommended: 1  Total # of Interventions Accepted: 1  Time Spent (min): 20

## 2023-05-19 ENCOUNTER — HOSPITAL ENCOUNTER (OUTPATIENT)
Dept: PHARMACY | Age: 83
Setting detail: THERAPIES SERIES
Discharge: HOME | End: 2023-05-19
Payer: MEDICARE

## 2023-05-19 DIAGNOSIS — I26.99: Primary | ICD-10-CM

## 2023-05-19 DIAGNOSIS — T81.718S: Primary | ICD-10-CM

## 2023-05-19 LAB — INTERNATIONAL NORMALIZATION RATIO, POC: 2.6

## 2023-05-19 PROCEDURE — 99211 OFF/OP EST MAY X REQ PHY/QHP: CPT

## 2023-05-19 PROCEDURE — 85610 PROTHROMBIN TIME: CPT

## 2023-05-19 NOTE — PROGRESS NOTES
Mr. Jacoby Voss is a 80 y.o. y/o male with history of PE who presents today for anticoagulation monitoring and adjustment.   INR 2.6 is therapeutic for this patient (goal range 2-3) and is reflective of 35 mg TWD  Patient verifies current dosing regimen, patient able to verbally recall dose  Patient reports no missed doses since last INR   Patient denies s/sx clotting and/or stroke  Patient denies hematuria, epistaxis, rectal bleeding  Patient denies changes in diet, alcohol, or tobacco use  Reviewed medication list and drug allergies with patient, updated any medication additions or modifications accordingly  Patient also denies any pending medical or dental procedures scheduled at this time  Patient was instructed to continue 35 mg TWD and RTC 6 weeks          For Pharmacy Admin Tracking Only    Intervention Detail: Adherence Monitorin  Total # of Interventions Recommended: 1  Total # of Interventions Accepted: 1  Time Spent (min): 10

## 2023-05-23 ENCOUNTER — HOSPITAL ENCOUNTER (OUTPATIENT)
Dept: CARDIOLOGY | Age: 83
Discharge: HOME | End: 2023-05-23
Payer: MEDICARE

## 2023-05-23 PROCEDURE — 93296 REM INTERROG EVL PM/IDS: CPT

## 2023-06-30 ENCOUNTER — HOSPITAL ENCOUNTER (OUTPATIENT)
Dept: PHARMACY | Age: 83
Setting detail: THERAPIES SERIES
Discharge: HOME | End: 2023-06-30
Payer: MEDICARE

## 2023-06-30 DIAGNOSIS — I26.99 IATROGENIC PULMONARY EMBOLISM, SUBSEQUENT ENCOUNTER (HCC): Primary | ICD-10-CM

## 2023-06-30 DIAGNOSIS — T81.718D IATROGENIC PULMONARY EMBOLISM, SUBSEQUENT ENCOUNTER (HCC): Primary | ICD-10-CM

## 2023-06-30 LAB — INTERNATIONAL NORMALIZATION RATIO, POC: 3.1

## 2023-06-30 PROCEDURE — 85610 PROTHROMBIN TIME: CPT

## 2023-06-30 PROCEDURE — 99211 OFF/OP EST MAY X REQ PHY/QHP: CPT

## 2023-08-11 ENCOUNTER — HOSPITAL ENCOUNTER (OUTPATIENT)
Dept: PHARMACY | Age: 83
Setting detail: THERAPIES SERIES
Discharge: HOME | End: 2023-08-11
Payer: MEDICARE

## 2023-08-11 DIAGNOSIS — I26.99 IATROGENIC PULMONARY EMBOLISM, SUBSEQUENT ENCOUNTER (HCC): Primary | ICD-10-CM

## 2023-08-11 DIAGNOSIS — T81.718D IATROGENIC PULMONARY EMBOLISM, SUBSEQUENT ENCOUNTER (HCC): Primary | ICD-10-CM

## 2023-08-11 LAB — INTERNATIONAL NORMALIZATION RATIO, POC: 3.2

## 2023-08-11 PROCEDURE — 99211 OFF/OP EST MAY X REQ PHY/QHP: CPT

## 2023-08-11 PROCEDURE — 85610 PROTHROMBIN TIME: CPT

## 2023-08-11 RX ORDER — HYDRALAZINE HYDROCHLORIDE 50 MG/1
50 TABLET, FILM COATED ORAL 3 TIMES DAILY
COMMUNITY

## 2023-08-11 RX ORDER — WARFARIN SODIUM 5 MG/1
TABLET ORAL
Qty: 100 TABLET | Refills: 1 | Status: SHIPPED | OUTPATIENT
Start: 2023-08-11

## 2023-08-11 RX ORDER — PROPRANOLOL HYDROCHLORIDE 40 MG/1
40 TABLET ORAL 2 TIMES DAILY
COMMUNITY

## 2023-08-11 NOTE — PROGRESS NOTES
Mr. Marylu Jacques is a 80 y.o. y/o male with history of PE who presents today for anticoagulation monitoring and adjustment. INR 3.2 is therapeutic for this patient (goal range 2-3) and is reflective of 35 mg TWD    Patient verifies current dosing regimen, patient able to verbally recall dose  Patient reports 0 missed doses since last INR   Patient denies s/sx clotting and/or stroke  Patient denies hematuria, epistaxis, rectal bleeding  Patient denies changes in diet, alcohol, or tobacco use  Reviewed medication list and drug allergies with patient, updated any medication additions or modifications accordingly  Patient also denies any pending medical or dental procedures scheduled at this time    Refill sent to [facility]    Patient's dose increased back in April - since then, he has been trending on the high end of his range.  May possibly need dose reduction if this trend continues    Patient was instructed to continue 35 mg TWD and RTC 6 weeks    Tim Villasenor PharmD  2023 8:03 AM      For Pharmacy Admin Tracking Only    Intervention Detail: Adherence Monitorin and Refill(s) Provided  Total # of Interventions Recommended: 1  Total # of Interventions Accepted: 1  Time Spent (min): 20

## 2023-08-22 ENCOUNTER — HOSPITAL ENCOUNTER (OUTPATIENT)
Dept: CARDIOLOGY | Age: 83
Discharge: HOME | End: 2023-08-22
Payer: MEDICARE

## 2023-08-22 PROCEDURE — 93280 PM DEVICE PROGR EVAL DUAL: CPT

## 2023-09-22 ENCOUNTER — HOSPITAL ENCOUNTER (OUTPATIENT)
Dept: PHARMACY | Age: 83
Setting detail: THERAPIES SERIES
Discharge: HOME | End: 2023-09-22
Payer: MEDICARE

## 2023-09-22 DIAGNOSIS — T81.718D IATROGENIC PULMONARY EMBOLISM, SUBSEQUENT ENCOUNTER (HCC): Primary | ICD-10-CM

## 2023-09-22 DIAGNOSIS — I26.99 IATROGENIC PULMONARY EMBOLISM, SUBSEQUENT ENCOUNTER (HCC): Primary | ICD-10-CM

## 2023-09-22 LAB
ALANINE AMINOTRANSFERASE (SGPT) (U/L) IN SER/PLAS: 17 U/L (ref 10–52)
ALBUMIN (G/DL) IN SER/PLAS: 4.1 G/DL (ref 3.4–5)
ALBUMIN: 4.1 G/DL (ref 3.4–5)
ALKALINE PHOSPHATASE (U/L) IN SER/PLAS: 43 U/L (ref 33–136)
ALP BLD-CCNC: 43 U/L (ref 33–136)
ALT SERPL-CCNC: 17 U/L (ref 10–52)
ANION GAP IN SER/PLAS: 9 MMOL/L (ref 10–20)
ANION GAP SERPL CALCULATED.3IONS-SCNC: 9 MMOL/L (ref 10–20)
ASPARTATE AMINOTRANSFERASE (SGOT) (U/L) IN SER/PLAS: 23 U/L (ref 9–39)
AST SERPL-CCNC: 23 U/L (ref 9–39)
BICARBONATE: 32 MMOL/L (ref 21–32)
BILIRUB SERPL-MCNC: 1 MG/DL (ref 0–1.2)
BILIRUBIN TOTAL (MG/DL) IN SER/PLAS: 1 MG/DL (ref 0–1.2)
CALCIUM (MG/DL) IN SER/PLAS: 9.2 MG/DL (ref 8.6–10.3)
CALCIUM SERPL-MCNC: 9.2 MG/DL (ref 8.6–10.3)
CARBON DIOXIDE, TOTAL (MMOL/L) IN SER/PLAS: 32 MMOL/L (ref 21–32)
CHLORIDE (MMOL/L) IN SER/PLAS: 103 MMOL/L (ref 98–107)
CHLORIDE BLD-SCNC: 103 MMOL/L (ref 98–107)
CHOLESTEROL (MG/DL) IN SER/PLAS: 127 MG/DL (ref 0–199)
CHOLESTEROL IN HDL (MG/DL) IN SER/PLAS: 43.4 MG/DL
CHOLESTEROL/HDL RATIO: 2.9
CHOLESTEROL/HDL RATIO: 2.9
CHOLESTEROL: 127 MG/DL (ref 0–199)
CREAT SERPL-MCNC: 1 MG/DL (ref 0.5–1.3)
CREATININE (MG/DL) IN SER/PLAS: 1 MG/DL (ref 0.5–1.3)
EGFR MALE: 74 ML/MIN/1.73M2
ERYTHROCYTE DISTRIBUTION WIDTH (RATIO) BY AUTOMATED COUNT: 14.2 % (ref 11.5–14.5)
ERYTHROCYTE MEAN CORPUSCULAR HEMOGLOBIN CONCENTRATION (G/DL) BY AUTOMATED: 32 G/DL (ref 32–36)
ERYTHROCYTE MEAN CORPUSCULAR VOLUME (FL) BY AUTOMATED COUNT: 92 FL (ref 80–100)
ERYTHROCYTE [DISTWIDTH] IN BLOOD BY AUTOMATED COUNT: 14.2 % (ref 11.5–14.5)
ERYTHROCYTES (10*6/UL) IN BLOOD BY AUTOMATED COUNT: 4.48 X10E12/L (ref 4.5–5.9)
GFR MALE: 74 ML/MIN/1.73M2
GLUCOSE (MG/DL) IN SER/PLAS: 98 MG/DL (ref 74–99)
GLUCOSE: 98 MG/DL (ref 74–99)
HCT VFR BLD CALC: 41.2 % (ref 41–52)
HDLC SERPL-MCNC: 43.4 MG/DL
HEMATOCRIT (%) IN BLOOD BY AUTOMATED COUNT: 41.2 % (ref 41–52)
HEMOGLOBIN (G/DL) IN BLOOD: 13.2 G/DL (ref 13.5–17.5)
HEMOGLOBIN: 13.2 G/DL (ref 13.5–17.5)
INTERNATIONAL NORMALIZATION RATIO, POC: 2.8
LDL CHOLESTEROL: 70 MG/DL (ref 0–99)
LDL: 70 MG/DL (ref 0–99)
LEUKOCYTES (10*3/UL) IN BLOOD BY AUTOMATED COUNT: 5.1 X10E9/L (ref 4.4–11.3)
MCHC RBC AUTO-ENTMCNC: 32 G/DL (ref 32–36)
MCV RBC AUTO: 92 FL (ref 80–100)
PLATELET # BLD: 192 X10E9/L (ref 150–450)
PLATELETS (10*3/UL) IN BLOOD AUTOMATED COUNT: 192 X10E9/L (ref 150–450)
POTASSIUM (MMOL/L) IN SER/PLAS: 4.4 MMOL/L (ref 3.5–5.3)
POTASSIUM SERPL-SCNC: 4.4 MMOL/L (ref 3.5–5.3)
PROTEIN TOTAL: 7 G/DL (ref 6.4–8.2)
RBC # BLD: 4.48 X10E12/L (ref 4.5–5.9)
SODIUM (MMOL/L) IN SER/PLAS: 140 MMOL/L (ref 136–145)
SODIUM BLD-SCNC: 140 MMOL/L (ref 136–145)
TOTAL PROTEIN: 7 G/DL (ref 6.4–8.2)
TRIGL SERPL-MCNC: 66 MG/DL (ref 0–149)
TRIGLYCERIDE (MG/DL) IN SER/PLAS: 66 MG/DL (ref 0–149)
UREA NITROGEN (MG/DL) IN SER/PLAS: 12 MG/DL (ref 6–23)
UREA NITROGEN: 12 MG/DL (ref 6–23)
VLDL: 13 MG/DL (ref 0–40)
VLDLC SERPL CALC-MCNC: 13 MG/DL (ref 0–40)
WBC: 5.1 X10E9/L (ref 4.4–11.3)

## 2023-09-22 PROCEDURE — 99211 OFF/OP EST MAY X REQ PHY/QHP: CPT

## 2023-09-22 PROCEDURE — 85610 PROTHROMBIN TIME: CPT

## 2023-09-22 NOTE — PROGRESS NOTES
Mr. Mariela Montoya is a 80 y.o. y/o male with history of PE who presents today for anticoagulation monitoring and adjustment.     INR 2.8 is therapeutic for this patient (goal range 2-3) and is reflective of 35 mg TWD    Patient verifies current dosing regimen, patient able to verbally recall dose  Patient reports 0 missed doses since last INR   Patient denies s/sx clotting and/or stroke  Patient denies hematuria, epistaxis, rectal bleeding  Patient denies changes in diet, alcohol, or tobacco use  - Patient reports increased intake of green vegetables due to two high readings in a row, and is now therapeutic  Reviewed medication list and drug allergies with patient, updated any medication additions or modifications accordingly  Patient also denies any pending medical or dental procedures scheduled at this time    Patient was instructed to continue 35 mg TWD and RTC 6 weeks    Radha Claudio, Jules  2023 8:02 AM      For Pharmacy Admin Tracking Only    Intervention Detail: Adherence Monitorin  Total # of Interventions Recommended: 1  Total # of Interventions Accepted: 1  Time Spent (min): 15

## 2023-11-03 ENCOUNTER — HOSPITAL ENCOUNTER (OUTPATIENT)
Dept: PHARMACY | Age: 83
Setting detail: THERAPIES SERIES
Discharge: HOME | End: 2023-11-03
Payer: MEDICARE

## 2023-11-03 DIAGNOSIS — I26.99 IATROGENIC PULMONARY EMBOLISM, INITIAL ENCOUNTER (HCC): Primary | ICD-10-CM

## 2023-11-03 DIAGNOSIS — T81.718A IATROGENIC PULMONARY EMBOLISM, INITIAL ENCOUNTER (HCC): Primary | ICD-10-CM

## 2023-11-03 LAB — INTERNATIONAL NORMALIZATION RATIO, POC: 3

## 2023-11-03 PROCEDURE — 99211 OFF/OP EST MAY X REQ PHY/QHP: CPT

## 2023-11-03 PROCEDURE — 85610 PROTHROMBIN TIME: CPT

## 2023-11-03 NOTE — PROGRESS NOTES
Mr. Carissa Garcia is a 80 y.o. y/o male with history of PE who presents today for anticoagulation monitoring and adjustment. INR 3 is therapeutic for this patient (goal range 2-3) and is reflective of 35 mg TWD  Patient verifies current dosing regimen, patient able to verbally recall dose  Patient reports 1 missed doses since last INR 2 weeks ago  Patient denies s/sx clotting and/or stroke  Patient denies hematuria, epistaxis, rectal bleeding  Patient denies changes in diet, alcohol, or tobacco use  Reviewed medication list and drug allergies with patient, updated any medication additions or modifications accordingly. Pt is currently on doxycycline for cellulitis for 4 more days (10 days total).  Advised pt that if antibiotic changes please call [facility]  Patient also denies any pending medical or dental procedures scheduled at this time  Patient was instructed to continue 35 mg TWD and RTC 6 weeks          For Pharmacy Admin Tracking Only    Intervention Detail: Adherence Monitorin  Total # of Interventions Recommended: 1  Total # of Interventions Accepted: 1  Time Spent (min): 15

## 2023-11-07 PROBLEM — I44.2 THIRD DEGREE AV BLOCK (MULTI): Status: ACTIVE | Noted: 2023-11-07

## 2023-11-07 PROBLEM — Z95.0 PRESENCE OF PERMANENT CARDIAC PACEMAKER: Status: ACTIVE | Noted: 2023-11-07

## 2023-11-07 PROBLEM — Z79.899 HIGH RISK MEDICATION USE: Status: ACTIVE | Noted: 2023-11-07

## 2023-11-07 PROBLEM — I42.0 DILATED CARDIOMYOPATHY (MULTI): Status: ACTIVE | Noted: 2023-11-07

## 2023-11-07 PROBLEM — E78.5 HYPERLIPIDEMIA: Status: ACTIVE | Noted: 2023-11-07

## 2023-11-07 PROBLEM — Z86.79 H/O SICK SINUS SYNDROME: Status: ACTIVE | Noted: 2023-11-07

## 2023-11-07 PROBLEM — R06.02 SOB (SHORTNESS OF BREATH) ON EXERTION: Status: ACTIVE | Noted: 2023-11-07

## 2023-11-07 PROBLEM — I25.10 ATHEROSCLEROSIS OF NATIVE CORONARY ARTERY OF NATIVE HEART WITHOUT ANGINA PECTORIS: Status: ACTIVE | Noted: 2023-11-07

## 2023-11-07 PROBLEM — I26.99 PULMONARY EMBOLISM (MULTI): Status: ACTIVE | Noted: 2023-11-07

## 2023-11-07 PROBLEM — I48.21 ATRIAL FIBRILLATION, PERMANENT (MULTI): Status: ACTIVE | Noted: 2023-11-07

## 2023-11-07 PROBLEM — I49.5 SINOATRIAL NODE DYSFUNCTION (MULTI): Status: ACTIVE | Noted: 2023-11-07

## 2023-11-07 PROBLEM — G47.30 SLEEP APNEA: Status: ACTIVE | Noted: 2023-11-07

## 2023-11-07 PROBLEM — I10 ACCELERATED ESSENTIAL HYPERTENSION: Status: ACTIVE | Noted: 2023-11-07

## 2023-11-07 PROBLEM — R00.2 PALPITATIONS: Status: ACTIVE | Noted: 2023-11-07

## 2023-11-07 PROBLEM — Z79.01 ANTICOAGULANT LONG-TERM USE: Status: ACTIVE | Noted: 2023-11-07

## 2023-11-07 PROBLEM — I50.1 CONGESTIVE HEART FAILURE, CHRONIC, LEFT-SIDED (MULTI): Status: ACTIVE | Noted: 2023-11-07

## 2023-11-07 PROBLEM — I44.1 ATRIOVENTRICULAR BLOCK, SECOND DEGREE: Status: ACTIVE | Noted: 2023-11-07

## 2023-11-07 RX ORDER — NITROGLYCERIN 0.4 MG/1
TABLET SUBLINGUAL
COMMUNITY

## 2023-11-07 RX ORDER — VALSARTAN 320 MG/1
320 TABLET ORAL DAILY
COMMUNITY
Start: 2023-08-28 | End: 2023-11-27 | Stop reason: SDUPTHER

## 2023-11-07 RX ORDER — WARFARIN SODIUM 5 MG/1
1 TABLET ORAL DAILY
COMMUNITY

## 2023-11-07 RX ORDER — IBUPROFEN 100 MG/5ML
SUSPENSION, ORAL (FINAL DOSE FORM) ORAL
COMMUNITY

## 2023-11-07 RX ORDER — TROSPIUM CHLORIDE 20 MG/1
1 TABLET, FILM COATED ORAL 2 TIMES DAILY
COMMUNITY

## 2023-11-07 RX ORDER — ACETAMINOPHEN 500 MG
1 TABLET ORAL EVERY 4 HOURS PRN
COMMUNITY

## 2023-11-07 RX ORDER — LUTEIN 6 MG
1 TABLET ORAL DAILY
COMMUNITY

## 2023-11-07 RX ORDER — ASPIRIN 81 MG/1
1 TABLET ORAL DAILY
COMMUNITY

## 2023-11-07 RX ORDER — TAMSULOSIN HYDROCHLORIDE 0.4 MG/1
1 CAPSULE ORAL DAILY
COMMUNITY

## 2023-11-07 RX ORDER — ATORVASTATIN CALCIUM 10 MG/1
10 TABLET, FILM COATED ORAL DAILY
COMMUNITY
End: 2023-11-27 | Stop reason: SDUPTHER

## 2023-11-07 RX ORDER — HYDRALAZINE HYDROCHLORIDE 50 MG/1
50 TABLET, FILM COATED ORAL 2 TIMES DAILY
COMMUNITY
Start: 2023-10-23 | End: 2023-11-27 | Stop reason: DRUGHIGH

## 2023-11-07 RX ORDER — ACETAMINOPHEN 500 MG
1 TABLET ORAL DAILY
COMMUNITY

## 2023-11-07 RX ORDER — DILTIAZEM HYDROCHLORIDE 180 MG/1
1 CAPSULE, EXTENDED RELEASE ORAL DAILY
COMMUNITY
Start: 2023-09-19 | End: 2023-11-27 | Stop reason: SDUPTHER

## 2023-11-07 RX ORDER — PROPRANOLOL HYDROCHLORIDE 80 MG/1
80 TABLET ORAL EVERY 12 HOURS
COMMUNITY
Start: 2023-10-23 | End: 2023-11-27 | Stop reason: SDUPTHER

## 2023-11-07 RX ORDER — CALCIUM CARBONATE/VITAMIN D3 500-10/5ML
1 LIQUID (ML) ORAL DAILY
COMMUNITY

## 2023-11-07 RX ORDER — MULTIVITAMIN
1 TABLET ORAL DAILY
COMMUNITY

## 2023-11-17 ENCOUNTER — HOSPITAL ENCOUNTER (OUTPATIENT)
Dept: CARDIOLOGY | Age: 83
Discharge: HOME | End: 2023-11-17
Payer: MEDICARE

## 2023-11-17 PROCEDURE — 93294 REM INTERROG EVL PM/LDLS PM: CPT | Performed by: INTERNAL MEDICINE

## 2023-11-17 PROCEDURE — 93296 REM INTERROG EVL PM/IDS: CPT

## 2023-11-27 ENCOUNTER — OFFICE VISIT (OUTPATIENT)
Dept: CARDIOLOGY | Facility: CLINIC | Age: 83
End: 2023-11-27
Payer: MEDICARE

## 2023-11-27 VITALS
HEIGHT: 75 IN | HEART RATE: 77 BPM | SYSTOLIC BLOOD PRESSURE: 144 MMHG | DIASTOLIC BLOOD PRESSURE: 84 MMHG | BODY MASS INDEX: 31.33 KG/M2 | WEIGHT: 252 LBS

## 2023-11-27 DIAGNOSIS — Z79.899 HIGH RISK MEDICATION USE: ICD-10-CM

## 2023-11-27 DIAGNOSIS — Z79.01 ANTICOAGULANT LONG-TERM USE: ICD-10-CM

## 2023-11-27 DIAGNOSIS — I48.21 ATRIAL FIBRILLATION, PERMANENT (MULTI): ICD-10-CM

## 2023-11-27 DIAGNOSIS — Z86.79 H/O SICK SINUS SYNDROME: ICD-10-CM

## 2023-11-27 DIAGNOSIS — E78.2 MIXED HYPERLIPIDEMIA: ICD-10-CM

## 2023-11-27 DIAGNOSIS — Z95.0 PRESENCE OF PERMANENT CARDIAC PACEMAKER: ICD-10-CM

## 2023-11-27 DIAGNOSIS — I49.5 SINOATRIAL NODE DYSFUNCTION (MULTI): ICD-10-CM

## 2023-11-27 DIAGNOSIS — I44.1 ATRIOVENTRICULAR BLOCK, SECOND DEGREE: ICD-10-CM

## 2023-11-27 DIAGNOSIS — I25.10 ATHEROSCLEROSIS OF NATIVE CORONARY ARTERY OF NATIVE HEART WITHOUT ANGINA PECTORIS: Primary | ICD-10-CM

## 2023-11-27 PROCEDURE — 1036F TOBACCO NON-USER: CPT | Performed by: INTERNAL MEDICINE

## 2023-11-27 PROCEDURE — 1159F MED LIST DOCD IN RCRD: CPT | Performed by: INTERNAL MEDICINE

## 2023-11-27 PROCEDURE — 3077F SYST BP >= 140 MM HG: CPT | Performed by: INTERNAL MEDICINE

## 2023-11-27 PROCEDURE — 3079F DIAST BP 80-89 MM HG: CPT | Performed by: INTERNAL MEDICINE

## 2023-11-27 PROCEDURE — 99214 OFFICE O/P EST MOD 30 MIN: CPT | Performed by: INTERNAL MEDICINE

## 2023-11-27 RX ORDER — HYDRALAZINE HYDROCHLORIDE 100 MG/1
TABLET, FILM COATED ORAL
Qty: 180 TABLET | Refills: 3 | Status: SHIPPED | OUTPATIENT
Start: 2023-11-27 | End: 2024-05-16 | Stop reason: SDUPTHER

## 2023-11-27 RX ORDER — DILTIAZEM HYDROCHLORIDE 180 MG/1
180 CAPSULE, EXTENDED RELEASE ORAL DAILY
Qty: 90 CAPSULE | Refills: 3 | Status: SHIPPED | OUTPATIENT
Start: 2023-11-27 | End: 2024-05-16 | Stop reason: SDUPTHER

## 2023-11-27 RX ORDER — VALSARTAN 320 MG/1
320 TABLET ORAL DAILY
Qty: 90 TABLET | Refills: 3 | Status: SHIPPED | OUTPATIENT
Start: 2023-11-27 | End: 2024-05-16 | Stop reason: SDUPTHER

## 2023-11-27 RX ORDER — PROPRANOLOL HYDROCHLORIDE 80 MG/1
80 TABLET ORAL EVERY 12 HOURS
Qty: 180 TABLET | Refills: 3 | Status: SHIPPED | OUTPATIENT
Start: 2023-11-27 | End: 2024-05-16 | Stop reason: SDUPTHER

## 2023-11-27 RX ORDER — ATORVASTATIN CALCIUM 10 MG/1
10 TABLET, FILM COATED ORAL DAILY
Qty: 90 TABLET | Refills: 3 | Status: SHIPPED | OUTPATIENT
Start: 2023-11-27 | End: 2024-05-16 | Stop reason: SDUPTHER

## 2023-11-27 NOTE — PROGRESS NOTES
Patient:  Oc Enriquez  YOB: 1940  MRN: [id number]       HPI:       Oc Enriquez is a 83 y.o. male who returns today for cardiac follow-up.   He has a history of permanent atrial fibrillation being managed with a rate control strategy. He was previously on sotalol but had prolonged asymptomatic episodes of atrial fibrillation. Dr. Potter subsequently discontinued sotalol. He has a history of sick sinus syndrome and complete heart block for which he underwent pacemaker implant in 2011. He had a generator change on September 27, 2019 ([employer] MRI device). He is pacemaker dependent. He has a history of mild coronary artery disease with cardiac catheterization in 2015 showing 40% distal LAD and otherwise trivial disease. He has normal left ventricular systolic function. He has a history of essential hypertension and hyperlipidemia. He had a pulmonary embolism and also a CVA in 1995. He has had recurrent TIAs with previous PFO closure. He is chronically anticoagulated with warfarin. He has obstructive sleep apnea for which he uses CPAP. He has chronic lower extremity edema. He has varicose veins and underwent previous laser vein therapy.      He was hospitalized at [facility] on May 24, 2022 for shortness of breath and chest discomfort. He was diagnosed with acute on chronic diastolic congestive heart failure. Chest x-ray showed vascular congestion. BNP was 3277. He was noted to have peripheral edema and amlodipine was discontinued. He complained of a dry cough and ACE inhibitor was changed to valsasrtan. He was given intravenous Lasix and had an approximate 5 L negative fluid balance. He was started on torsemide. He was also diagnosed with exacerbation of COPD. Echocardiogram on May 24, 2022 showed estimated LV ejection fraction 45 to 50%. There was distal inferior, distal septal, and apical hypokinesis. There was 1-2+ aortic regurgitation. 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 He was seen by Dr. Potter on June 20, 2022. He recommended continued medical therapy with plans to upgrade to a biventricular system if he has recurrent symptoms. CT scan of the chest May 24, 2022 was negative for pulmonary embolus. Myocardial perfusion study on June 30, 2022 was normal without ischemia or infarction. Calculated LV ejection fraction was 61%.     He has been doing reasonably well since his last visit. He was diagnosed with essential tremor and was placed on low-dose propranolol 40 mg twice daily.  This was subsequently increased to 80 mg twice daily.  He notes that he is tremor is markedly improved.  At the time of his last visit he was started on hydralazine 50 mg twice daily.  His current blood pressures are running in the 130s to 140s range.  He was previously intolerant to amlodipine and HCTZ.  He denies any chest pain or shortness of breath. He denies any orthopnea, PND, or increasing peripheral edema. He denies any palpitations, lightheadedness, near-syncope, or syncope. He denies any fever, chills, or cough. He denies any nausea, vomiting, or diaphoresis. He denies any hemoptysis, hematemesis, melena, or hematochezia.      Device check on Mary 23, 2023 showed normal function. 100% RV pacing. Atrial fibrillation burden 100%. Lab studies dated September 20, 2023 showed a hemoglobin 13.2 and hematocrit 41.2.  Comprehensive metabolic profile was normal.  Cholesterol 127 with HDL 44, LDL 70, and triglycerides 66.  He was advised to increase hydralazine to 100 mg twice daily.  Other details as noted below.     The above portion of this note was dictated by me using voice recognition software. I personally performed the services described in the documentation. The scribe entering the documentation below was in my presence. I affirm that the information is both accurate and complete.       Objective:     Vitals:     11/27/23 1410   BP: 144/84   Pulse: 77       Wt Readings from Last 4 Encounters:   11/27/23 114 kg (252 lb)   07/27/23 111 kg (244 lb)   03/20/23 116 kg (255 lb)   01/26/23 110 kg (243 lb)       Allergies:     Allergies   Allergen Reactions    Adhesive Tape-Silicones Unknown        Medications:     Current Outpatient Medications   Medication Instructions    acetaminophen (Tylenol Extra Strength) 500 mg tablet 1 tablet, oral, Every 4 hours PRN    ascorbic acid (Vitamin C) 1,000 mg tablet TAKE 1 TABLET TWO TO THREE TIMES WEEKLY    aspirin 81 mg EC tablet 1 tablet, oral, Daily    atorvastatin (LIPITOR) 10 mg, oral, Daily    cholecalciferol (Vitamin D-3) 5,000 Units tablet 1 tablet, oral, Daily    dilTIAZem ER (Tiazac) 180 mg 24 hr capsule 1 capsule, oral, Daily    hydrALAZINE (APRESOLINE) 50 mg, oral, 2 times daily    magnesium oxide 400 mg magnesium capsule 1 capsule, oral, Daily    multivitamin (Daily Multi-Vitamin) tablet 1 tablet, oral, Daily    nitroglycerin (Nitrostat) 0.4 mg SL tablet sublingual    propranolol (INDERAL) 80 mg, oral, Every 12 hours    tamsulosin (Flomax) 0.4 mg 24 hr capsule 1 tablet, oral, Daily    trospium (Sanctura) 20 mg tablet 1 tablet, oral, 2 times daily    valsartan (DIOVAN) 320 mg, oral, Daily    vitamin E acid succinate (vitamin E succinate) 268 mg (400 unit) tablet 1 tablet, oral, Daily    warfarin (Jantoven) 5 mg tablet 1 tablet, oral, Daily       Physical Examination:   GENERAL:  Well developed, well nourished, in no acute distress.  CHEST:  Symmetric and nontender.  NEURO/PSYCH:  Alert and oriented times three with approppriate behavior and responses.  NECK:  Supple, no JVD, no bruit.  LUNGS:  Clear to auscultation bilaterally, normal respiratory effort.  HEART:  Irregularly irregular rhythm and rate with no evident murmur, no gallop appreciated.        There are no rubs, clicks or heaves.  EXTREMITIES:  Warm with good color, no clubbing or cyanosis.  There is 1-2+ edema noted  "bilateral.  PERIPHERAL VASCULAR:  Pulses present and equally palpable; 2+ throughout.      Lab:     CBC:   Lab Results   Component Value Date    WBC 5.1 09/22/2023    RBC 4.48 (L) 09/22/2023    HGB 13.2 (L) 09/22/2023    HCT 41.2 09/22/2023     09/22/2023        CMP:    Lab Results   Component Value Date     09/22/2023    K 4.4 09/22/2023     09/22/2023    CO2 32 09/22/2023    BUN 12 09/22/2023    CREATININE 1.00 09/22/2023    GLUCOSE 98 09/22/2023    CALCIUM 9.2 09/22/2023       Lipid Profile:    Lab Results   Component Value Date    TRIG 66 09/22/2023    HDL 43.4 09/22/2023       PT/INR:    Lab Results   Component Value Date    PROTIME 22.3 (H) 09/13/2019    INR 2.0 (H) 09/13/2019       HgBA1c:    Lab Results   Component Value Date    HGBA1C 5.9 (H) 06/12/2023       BMP:  Lab Results   Component Value Date     09/22/2023     01/19/2023     01/07/2022    K 4.4 09/22/2023    K 4.1 01/19/2023    K 4.4 01/07/2022     09/22/2023     01/19/2023     01/07/2022    CO2 32 09/22/2023    CO2 33 (H) 01/19/2023    CO2 31 01/07/2022    BUN 12 09/22/2023    BUN 17 01/19/2023    BUN 20 01/07/2022    CREATININE 1.00 09/22/2023    CREATININE 1.11 01/19/2023    CREATININE 0.93 01/07/2022       CBC:  Lab Results   Component Value Date    WBC 5.1 09/22/2023    WBC 5.1 01/19/2023    WBC 5.8 01/07/2022    RBC 4.48 (L) 09/22/2023    RBC 4.66 01/19/2023    RBC 5.03 01/07/2022    HGB 13.2 (L) 09/22/2023    HGB 13.5 01/19/2023    HGB 14.6 01/07/2022    HCT 41.2 09/22/2023    HCT 41.5 01/19/2023    HCT 45.8 01/07/2022    MCV 92 09/22/2023    MCV 89 01/19/2023    MCV 91 01/07/2022    MCHC 32.0 09/22/2023    MCHC 32.5 01/19/2023    MCHC 31.9 (L) 01/07/2022    RDW 14.2 09/22/2023    RDW 13.9 01/19/2023    RDW 13.5 01/07/2022     09/22/2023     01/19/2023     01/07/2022       Cardiac Enzymes:    No results found for: \"TROPHS\"    Hepatic Function Panel:    Lab Results "   Component Value Date    ALKPHOS 43 09/22/2023    ALT 17 09/22/2023    AST 23 09/22/2023    PROT 7.0 09/22/2023    BILITOT 1.0 09/22/2023       Diagnostic Studies:     === 06/30/22 ===    - Impression -  Normal Lexiscan Myoview cardiac perfusion stress test.  No evidence of ischemia or myocardial infarction by perfusion imaging.  Normal left ventricular systolic function, ejection fraction 61%.  Low risk Lexiscan Myoview cardiac perfusion stress test.  Comparison study dated March 9, 2012 was negative for ischemia or  infarction. Calculated LV ejection fraction 68%.      Problem List:     Patient Active Problem List   Diagnosis    Accelerated essential hypertension    Atherosclerosis of native coronary artery of native heart without angina pectoris    Atrial fibrillation, permanent (CMS/HCC)    Atrioventricular block, second degree    Congestive heart failure, chronic, left-sided (CMS/HCC)    Dilated cardiomyopathy (CMS/HCC)    H/O sick sinus syndrome    Hyperlipidemia    Palpitations    Presence of permanent cardiac pacemaker    Pulmonary embolism (CMS/HCC)    Sinoatrial node dysfunction (CMS/HCC)    Sleep apnea    SOB (shortness of breath) on exertion    Third degree AV block (CMS/HCC)    Anticoagulant long-term use    High risk medication use       Asessment:     Problem List Items Addressed This Visit             ICD-10-CM    Atherosclerosis of native coronary artery of native heart without angina pectoris - Primary I25.10    Relevant Medications    hydrALAZINE (Apresoline) 100 mg tablet    atorvastatin (Lipitor) 10 mg tablet    dilTIAZem ER (Tiazac) 180 mg 24 hr capsule    propranolol (Inderal) 80 mg tablet    valsartan (Diovan) 320 mg tablet    Other Relevant Orders    Follow Up In Cardiology    Comprehensive Metabolic Panel    CBC    Lipid Panel    Atrial fibrillation, permanent (CMS/HCC) I48.21    Relevant Medications    hydrALAZINE (Apresoline) 100 mg tablet    atorvastatin (Lipitor) 10 mg tablet     dilTIAZem ER (Tiazac) 180 mg 24 hr capsule    propranolol (Inderal) 80 mg tablet    valsartan (Diovan) 320 mg tablet    Other Relevant Orders    Follow Up In Cardiology    Comprehensive Metabolic Panel    CBC    Lipid Panel    Atrioventricular block, second degree I44.1    Relevant Medications    hydrALAZINE (Apresoline) 100 mg tablet    atorvastatin (Lipitor) 10 mg tablet    dilTIAZem ER (Tiazac) 180 mg 24 hr capsule    propranolol (Inderal) 80 mg tablet    valsartan (Diovan) 320 mg tablet    Other Relevant Orders    Follow Up In Cardiology    Comprehensive Metabolic Panel    CBC    Lipid Panel    H/O sick sinus syndrome Z86.79    Relevant Medications    hydrALAZINE (Apresoline) 100 mg tablet    atorvastatin (Lipitor) 10 mg tablet    dilTIAZem ER (Tiazac) 180 mg 24 hr capsule    propranolol (Inderal) 80 mg tablet    valsartan (Diovan) 320 mg tablet    Other Relevant Orders    Follow Up In Cardiology    Comprehensive Metabolic Panel    CBC    Lipid Panel    Hyperlipidemia E78.5    Relevant Medications    hydrALAZINE (Apresoline) 100 mg tablet    atorvastatin (Lipitor) 10 mg tablet    dilTIAZem ER (Tiazac) 180 mg 24 hr capsule    propranolol (Inderal) 80 mg tablet    valsartan (Diovan) 320 mg tablet    Other Relevant Orders    Follow Up In Cardiology    Comprehensive Metabolic Panel    CBC    Lipid Panel    Presence of permanent cardiac pacemaker Z95.0    Relevant Medications    hydrALAZINE (Apresoline) 100 mg tablet    atorvastatin (Lipitor) 10 mg tablet    dilTIAZem ER (Tiazac) 180 mg 24 hr capsule    propranolol (Inderal) 80 mg tablet    valsartan (Diovan) 320 mg tablet    Other Relevant Orders    Follow Up In Cardiology    Comprehensive Metabolic Panel    CBC    Lipid Panel    Sinoatrial node dysfunction (CMS/HCC) I49.5    Relevant Medications    hydrALAZINE (Apresoline) 100 mg tablet    atorvastatin (Lipitor) 10 mg tablet    dilTIAZem ER (Tiazac) 180 mg 24 hr capsule    propranolol (Inderal) 80 mg tablet     valsartan (Diovan) 320 mg tablet    Other Relevant Orders    Follow Up In Cardiology    Comprehensive Metabolic Panel    CBC    Lipid Panel    Anticoagulant long-term use Z79.01    Relevant Medications    hydrALAZINE (Apresoline) 100 mg tablet    atorvastatin (Lipitor) 10 mg tablet    dilTIAZem ER (Tiazac) 180 mg 24 hr capsule    propranolol (Inderal) 80 mg tablet    valsartan (Diovan) 320 mg tablet    Other Relevant Orders    Follow Up In Cardiology    Comprehensive Metabolic Panel    CBC    Lipid Panel    High risk medication use Z79.899    Relevant Medications    hydrALAZINE (Apresoline) 100 mg tablet    atorvastatin (Lipitor) 10 mg tablet    dilTIAZem ER (Tiazac) 180 mg 24 hr capsule    propranolol (Inderal) 80 mg tablet    valsartan (Diovan) 320 mg tablet    Other Relevant Orders    Follow Up In Cardiology    Comprehensive Metabolic Panel    CBC    Lipid Panel       Scribe Attestation  By signing my name below, I, Leola Martinez CMA   , Scribe   attest that this documentation has been prepared under the direction and in the presence of Emery Bragg MD.

## 2023-12-14 ENCOUNTER — HOSPITAL ENCOUNTER (OUTPATIENT)
Dept: PHARMACY | Age: 83
Setting detail: THERAPIES SERIES
Discharge: HOME | End: 2023-12-14
Payer: MEDICARE

## 2023-12-14 DIAGNOSIS — T81.718D IATROGENIC PULMONARY EMBOLISM, SUBSEQUENT ENCOUNTER (HCC): Primary | ICD-10-CM

## 2023-12-14 DIAGNOSIS — I26.99 IATROGENIC PULMONARY EMBOLISM, SUBSEQUENT ENCOUNTER (HCC): Primary | ICD-10-CM

## 2023-12-14 LAB — INTERNATIONAL NORMALIZATION RATIO, POC: 2.8

## 2023-12-14 PROCEDURE — 85610 PROTHROMBIN TIME: CPT | Performed by: PHARMACIST

## 2023-12-14 PROCEDURE — 99211 OFF/OP EST MAY X REQ PHY/QHP: CPT | Performed by: PHARMACIST

## 2023-12-14 NOTE — PROGRESS NOTES
Mr. Maddie Lezama is a 80 y.o. y/o male with history of PE who presents today for anticoagulation monitoring and adjustment.     INR 2.8 is therapeutic for this patient (goal range 2.0-3.0) and is reflective of 35 mg TWD    Patient verifies current dosing regimen, patient able to verbally recall dose  Patient reports 0 missed doses since last INR   Patient denies s/sx clotting and/or stroke  Patient denies hematuria, epistaxis, rectal bleeding  Patient denies changes in diet, alcohol, or tobacco use  Reviewed medication list and drug allergies with patient, updated any medication additions or modifications accordingly  Patient also denies any pending medical or dental procedures scheduled at this time    Patient was instructed to continue current regimen and RTC 6 weeks      For Pharmacy Admin Tracking Only    Intervention Detail: Adherence Monitorin  Total # of Interventions Recommended: 1  Total # of Interventions Accepted: 1  Time Spent (min): 15

## 2024-01-26 ENCOUNTER — HOSPITAL ENCOUNTER (OUTPATIENT)
Dept: PHARMACY | Age: 84
Setting detail: THERAPIES SERIES
Discharge: HOME | End: 2024-01-26
Payer: MEDICARE

## 2024-01-26 DIAGNOSIS — T81.718D IATROGENIC PULMONARY EMBOLISM, SUBSEQUENT ENCOUNTER (HCC): Primary | ICD-10-CM

## 2024-01-26 DIAGNOSIS — I26.99 IATROGENIC PULMONARY EMBOLISM, SUBSEQUENT ENCOUNTER (HCC): Primary | ICD-10-CM

## 2024-01-26 LAB — INTERNATIONAL NORMALIZATION RATIO, POC: 2.5

## 2024-01-26 PROCEDURE — 85610 PROTHROMBIN TIME: CPT

## 2024-01-26 PROCEDURE — 99211 OFF/OP EST MAY X REQ PHY/QHP: CPT

## 2024-01-26 RX ORDER — WARFARIN SODIUM 5 MG/1
TABLET ORAL
Qty: 100 TABLET | Refills: 1 | Status: SHIPPED | OUTPATIENT
Start: 2024-01-26

## 2024-01-26 NOTE — PROGRESS NOTES
Mr. Ian Bates is a 84 y.o. y/o male with history of PE who presents today for anticoagulation monitoring and adjustment.    INR 2.5 is therapeutic for this patient (goal range 2-3) and is reflective of 35 mg TWD  Patient verifies current dosing regimen, patient able to verbally recall dose  Patient reports 0  missed doses since last INR   Patient denies s/sx clotting and/or stroke  Patient denies hematuria, epistaxis, rectal bleeding  Patient denies changes in diet, alcohol, or tobacco use  Reviewed medication list and drug allergies with patient, updated any medication additions or modifications accordingly  Patient also denies any pending medical or dental procedures scheduled at this time  Patient was instructed to continue 35 mg TWD and RTC 6 weeks  Pt now sees Dr Jiang at [facility]- will send consult agreement to their office   For Pharmacy Admin Tracking Only    Intervention Detail: Adherence Monitorin  Total # of Interventions Recommended: 1  Total # of Interventions Accepted: 1  Time Spent (min): 15

## 2024-02-15 ENCOUNTER — OFFICE VISIT (OUTPATIENT)
Dept: PULMONOLOGY | Age: 84
End: 2024-02-15
Payer: MEDICARE

## 2024-02-15 VITALS
DIASTOLIC BLOOD PRESSURE: 78 MMHG | OXYGEN SATURATION: 95 % | TEMPERATURE: 97.6 F | BODY MASS INDEX: 29.66 KG/M2 | HEIGHT: 75 IN | WEIGHT: 238.54 LBS | SYSTOLIC BLOOD PRESSURE: 138 MMHG | HEART RATE: 78 BPM

## 2024-02-15 DIAGNOSIS — I10 HYPERTENSION, UNSPECIFIED TYPE: ICD-10-CM

## 2024-02-15 DIAGNOSIS — G47.34 SLEEP RELATED HYPOXIA: ICD-10-CM

## 2024-02-15 DIAGNOSIS — G47.33 OSA ON CPAP: Primary | ICD-10-CM

## 2024-02-15 PROCEDURE — G8484 FLU IMMUNIZE NO ADMIN: HCPCS | Performed by: INTERNAL MEDICINE

## 2024-02-15 PROCEDURE — 3078F DIAST BP <80 MM HG: CPT | Performed by: INTERNAL MEDICINE

## 2024-02-15 PROCEDURE — G8417 CALC BMI ABV UP PARAM F/U: HCPCS | Performed by: INTERNAL MEDICINE

## 2024-02-15 PROCEDURE — 1123F ACP DISCUSS/DSCN MKR DOCD: CPT | Performed by: INTERNAL MEDICINE

## 2024-02-15 PROCEDURE — 1036F TOBACCO NON-USER: CPT | Performed by: INTERNAL MEDICINE

## 2024-02-15 PROCEDURE — 99213 OFFICE O/P EST LOW 20 MIN: CPT | Performed by: INTERNAL MEDICINE

## 2024-02-15 PROCEDURE — G8427 DOCREV CUR MEDS BY ELIG CLIN: HCPCS | Performed by: INTERNAL MEDICINE

## 2024-02-15 PROCEDURE — 3075F SYST BP GE 130 - 139MM HG: CPT | Performed by: INTERNAL MEDICINE

## 2024-02-15 NOTE — PROGRESS NOTES
PATIENT VISIT-PULMONARY/SLEEP    2/15/2024       May  REFERRING PHYSICIAN:  Carina Kelly MD     REASON FOR REFERRAL:  CARO    HPI:     Ian Bates is a 84 y.o. male who was referred to sleep and pulmonary clinic for evaluation.     He is known to have history of coronary artery disease, A. fib, hypertension and pulmonary emboli.  He recently had a sleep study which I reviewed personally and interpreted the results independently.  He had a mild case of sleep apnea with an overall AHI of 12.  There was moderate nocturnal desaturations as well.  He had a machine for the last 5 years.  However, his machine is on recall.  He has not been using it for few months.  He has been waiting for [facility] to get him a new machine but this has been taken longer than he expected.  He is only using oxygen for now.  He started and sleepy during the day and he dozes off easily.  Weight has been stable overall.  Has some mild shortness of breath with activities.      2/13/23:    He comes for follow up. He was prescribed AutoPAP, min pressure of 5, max pressure 10 CM H2O. compliance has been good.  He uses the machine regularly every night.  He is averaging about 6 hours.  He feels more refreshed.  He feels that he has more energy.  His sleep is resting.  Denies any issues with the mask of the machine or the pressure.    2/15/24:    Has been doing well overall.  Using the machine regularly.  Averaging about 5 to 6 hours every night.  Tolerating the machine pretty well.  No issues with the mask.  Getting supplies regularly.  Weight has been overall about the same.  Continues to be using oxygen with the CPAP.        Past Medical History   No past medical history on file.    Past Surgical History  No past surgical history on file.    Allergies  Allergies   Allergen Reactions    Adhesive Tape Rash       Medications  Current Outpatient Medications   Medication Sig Dispense Refill    warfarin (COUMADIN) 5

## 2024-03-08 ENCOUNTER — HOSPITAL ENCOUNTER (OUTPATIENT)
Dept: PHARMACY | Age: 84
Setting detail: THERAPIES SERIES
Discharge: HOME | End: 2024-03-08
Payer: MEDICARE

## 2024-03-08 DIAGNOSIS — T81.718D IATROGENIC PULMONARY EMBOLISM, SUBSEQUENT ENCOUNTER (HCC): Primary | ICD-10-CM

## 2024-03-08 DIAGNOSIS — I26.99 IATROGENIC PULMONARY EMBOLISM, SUBSEQUENT ENCOUNTER (HCC): Primary | ICD-10-CM

## 2024-03-08 LAB — INTERNATIONAL NORMALIZATION RATIO, POC: 2.2

## 2024-03-08 PROCEDURE — 85610 PROTHROMBIN TIME: CPT | Performed by: PHARMACIST

## 2024-03-08 PROCEDURE — 99211 OFF/OP EST MAY X REQ PHY/QHP: CPT | Performed by: PHARMACIST

## 2024-03-08 NOTE — PROGRESS NOTES
Mr. Ian Bates is a 84 y.o. y/o male with history of PE who presents today for anticoagulation monitoring and adjustment.    INR 2.2 is therapeutic for this patient (goal range 2.0-3.0) and is reflective of 35 mg TWD    Patient verifies current dosing regimen, patient able to verbally recall dose    Patient reports 1 missed doses since last INR - patient is unsure it may be Friday or Saturday of last week    Patient denies s/sx clotting and/or stroke  Patient denies hematuria, epistaxis, rectal bleeding  Patient denies changes in diet, alcohol, or tobacco use  Reviewed medication list and drug allergies with patient, updated any medication additions or modifications accordingly  Patient also denies any pending medical or dental procedures scheduled at this time    Patient was instructed to continue current regimen and RTC 6 weeks      For Pharmacy Admin Tracking Only    Intervention Detail: Adherence Monitorin  Total # of Interventions Recommended: 1  Total # of Interventions Accepted: 1  Time Spent (min): 15

## 2024-03-26 ENCOUNTER — HOSPITAL ENCOUNTER (OUTPATIENT)
Dept: CARDIOLOGY | Age: 84
Discharge: HOME | End: 2024-03-26
Payer: MEDICARE

## 2024-03-26 PROCEDURE — 93296 REM INTERROG EVL PM/IDS: CPT

## 2024-03-26 PROCEDURE — 93294 REM INTERROG EVL PM/LDLS PM: CPT | Performed by: INTERNAL MEDICINE

## 2024-03-28 DIAGNOSIS — Z95.0 PRESENCE OF PERMANENT CARDIAC PACEMAKER: ICD-10-CM

## 2024-04-19 ENCOUNTER — HOSPITAL ENCOUNTER (OUTPATIENT)
Dept: PHARMACY | Age: 84
Setting detail: THERAPIES SERIES
Discharge: HOME | End: 2024-04-19
Payer: MEDICARE

## 2024-04-19 DIAGNOSIS — T81.718D IATROGENIC PULMONARY EMBOLISM, SUBSEQUENT ENCOUNTER (HCC): Primary | ICD-10-CM

## 2024-04-19 DIAGNOSIS — I26.99 IATROGENIC PULMONARY EMBOLISM, SUBSEQUENT ENCOUNTER (HCC): Primary | ICD-10-CM

## 2024-04-19 LAB — INTERNATIONAL NORMALIZATION RATIO, POC: 3.5

## 2024-04-19 PROCEDURE — 85610 PROTHROMBIN TIME: CPT | Performed by: PHARMACIST

## 2024-04-19 PROCEDURE — 99213 OFFICE O/P EST LOW 20 MIN: CPT | Performed by: PHARMACIST

## 2024-04-19 NOTE — PROGRESS NOTES
Mr. Ian Bates is a 84 y.o. y/o male with history of PE who presents today for anticoagulation monitoring and adjustment.  INR 3.5 is supratherapeutic for this patient (goal range 2-3) and is reflective of 35 mg TWD  Patient verifies current dosing regimen, patient able to verbally recall dose  Patient reports no  missed doses since last INR   Patient denies s/sx clotting and/or stroke  Patient denies hematuria, epistaxis, rectal bleeding  Patient denies changes in diet, alcohol, or tobacco use  Reviewed medication list and drug allergies with patient, updated any medication additions or modifications accordingly  Patient also denies any pending medical or dental procedures scheduled at this time  Pt has been using Tylenol 3000mg/day last few days for muscle pain after yard work.  Advised that >2000mg/day for multiple days may increase INR.  Pt says he is feeling better now and expects to use Tylenol only occasionally.  Patient was instructed to hold dose today then continue 35mg TWD and RTC 6 weeks      For Pharmacy Admin Tracking Only    Intervention Detail: Dose Adjustment: 1, reason: Therapy De-escalation  Total # of Interventions Recommended: 1  Total # of Interventions Accepted: 1  Time Spent (min): 20

## 2024-05-06 ENCOUNTER — APPOINTMENT (OUTPATIENT)
Dept: CARDIOLOGY | Facility: CLINIC | Age: 84
End: 2024-05-06
Payer: MEDICARE

## 2024-05-16 ENCOUNTER — OFFICE VISIT (OUTPATIENT)
Dept: CARDIOLOGY | Facility: CLINIC | Age: 84
End: 2024-05-16
Payer: MEDICARE

## 2024-05-16 VITALS
DIASTOLIC BLOOD PRESSURE: 88 MMHG | SYSTOLIC BLOOD PRESSURE: 130 MMHG | HEIGHT: 75 IN | BODY MASS INDEX: 30.71 KG/M2 | HEART RATE: 74 BPM | WEIGHT: 247 LBS

## 2024-05-16 DIAGNOSIS — I48.21 ATRIAL FIBRILLATION, PERMANENT (MULTI): Primary | ICD-10-CM

## 2024-05-16 DIAGNOSIS — I50.1 CONGESTIVE HEART FAILURE, CHRONIC, LEFT-SIDED (MULTI): ICD-10-CM

## 2024-05-16 DIAGNOSIS — E78.2 MIXED HYPERLIPIDEMIA: ICD-10-CM

## 2024-05-16 DIAGNOSIS — Z78.9 NEVER SMOKED TOBACCO: ICD-10-CM

## 2024-05-16 DIAGNOSIS — Z79.01 ANTICOAGULANT LONG-TERM USE: ICD-10-CM

## 2024-05-16 DIAGNOSIS — Z86.79 H/O SICK SINUS SYNDROME: ICD-10-CM

## 2024-05-16 DIAGNOSIS — I49.5 SINOATRIAL NODE DYSFUNCTION (MULTI): ICD-10-CM

## 2024-05-16 DIAGNOSIS — Z79.899 HIGH RISK MEDICATION USE: ICD-10-CM

## 2024-05-16 DIAGNOSIS — Z95.0 PRESENCE OF PERMANENT CARDIAC PACEMAKER: ICD-10-CM

## 2024-05-16 DIAGNOSIS — I44.1 ATRIOVENTRICULAR BLOCK, SECOND DEGREE: ICD-10-CM

## 2024-05-16 DIAGNOSIS — I25.10 ATHEROSCLEROSIS OF NATIVE CORONARY ARTERY OF NATIVE HEART WITHOUT ANGINA PECTORIS: ICD-10-CM

## 2024-05-16 PROCEDURE — 99214 OFFICE O/P EST MOD 30 MIN: CPT | Performed by: INTERNAL MEDICINE

## 2024-05-16 PROCEDURE — 1036F TOBACCO NON-USER: CPT | Performed by: INTERNAL MEDICINE

## 2024-05-16 PROCEDURE — 1159F MED LIST DOCD IN RCRD: CPT | Performed by: INTERNAL MEDICINE

## 2024-05-16 PROCEDURE — 3079F DIAST BP 80-89 MM HG: CPT | Performed by: INTERNAL MEDICINE

## 2024-05-16 PROCEDURE — 3075F SYST BP GE 130 - 139MM HG: CPT | Performed by: INTERNAL MEDICINE

## 2024-05-16 RX ORDER — PROPRANOLOL HYDROCHLORIDE 80 MG/1
80 TABLET ORAL EVERY 12 HOURS
Qty: 180 TABLET | Refills: 3 | Status: SHIPPED | OUTPATIENT
Start: 2024-05-16

## 2024-05-16 RX ORDER — HYDRALAZINE HYDROCHLORIDE 100 MG/1
TABLET, FILM COATED ORAL
Qty: 180 TABLET | Refills: 3 | Status: SHIPPED | OUTPATIENT
Start: 2024-05-16

## 2024-05-16 RX ORDER — ATORVASTATIN CALCIUM 10 MG/1
10 TABLET, FILM COATED ORAL DAILY
Qty: 90 TABLET | Refills: 3 | Status: SHIPPED | OUTPATIENT
Start: 2024-05-16

## 2024-05-16 RX ORDER — VALSARTAN 320 MG/1
320 TABLET ORAL DAILY
Qty: 90 TABLET | Refills: 3 | Status: SHIPPED | OUTPATIENT
Start: 2024-05-16

## 2024-05-16 RX ORDER — DILTIAZEM HYDROCHLORIDE 180 MG/1
180 CAPSULE, EXTENDED RELEASE ORAL DAILY
Qty: 90 CAPSULE | Refills: 3 | Status: SHIPPED | OUTPATIENT
Start: 2024-05-16

## 2024-05-16 ASSESSMENT — ENCOUNTER SYMPTOMS
OCCASIONAL FEELINGS OF UNSTEADINESS: 0
LOSS OF SENSATION IN FEET: 0
DEPRESSION: 0

## 2024-05-16 NOTE — PATIENT INSTRUCTIONS
Please bring all medicines, vitamins, and herbal supplements with you in original bottles to every appointment!    Prescriptions will not be filled unless you are compliant with your follow up appointments or have a follow up appointment scheduled as per instruction of your physician. Refills should be requested at the time of your visit.    
rectal

## 2024-05-31 ENCOUNTER — APPOINTMENT (OUTPATIENT)
Dept: CARDIOLOGY | Facility: CLINIC | Age: 84
End: 2024-05-31
Payer: MEDICARE

## 2024-05-31 ENCOUNTER — HOSPITAL ENCOUNTER (OUTPATIENT)
Dept: PHARMACY | Age: 84
Setting detail: THERAPIES SERIES
Discharge: HOME | End: 2024-05-31
Payer: MEDICARE

## 2024-05-31 DIAGNOSIS — T81.718D IATROGENIC PULMONARY EMBOLISM, SUBSEQUENT ENCOUNTER (HCC): Primary | ICD-10-CM

## 2024-05-31 DIAGNOSIS — I26.99 IATROGENIC PULMONARY EMBOLISM, SUBSEQUENT ENCOUNTER (HCC): Primary | ICD-10-CM

## 2024-05-31 LAB — INTERNATIONAL NORMALIZATION RATIO, POC: 3.3

## 2024-05-31 PROCEDURE — 99213 OFFICE O/P EST LOW 20 MIN: CPT | Performed by: PHARMACIST

## 2024-05-31 PROCEDURE — 85610 PROTHROMBIN TIME: CPT | Performed by: PHARMACIST

## 2024-05-31 NOTE — PROGRESS NOTES
Mr. Ian Bates is a 84 y.o. y/o male with history of PE who presents today for anticoagulation monitoring and adjustment.  INR 3.3 is supratherapeutic for this patient (goal range 2-3) and is reflective of 35 mg TWD  Patient verifies current dosing regimen, patient able to verbally recall dose  Patient reports no  missed doses since last INR   Patient denies s/sx clotting and/or stroke  Patient denies hematuria, epistaxis, rectal bleeding  Patient denies changes in diet, alcohol, or tobacco use  Reviewed medication list and drug allergies with patient, updated any medication additions or modifications accordingly  Patient also denies any pending medical or dental procedures scheduled at this time.  Pt  uses Tylenol for back pain, advised to limit to 2000mg/day or less as needed.   Patient was instructed to hold dose today then continue 35mg TWD and RTC 6 weeks      For Pharmacy Admin Tracking Only    Intervention Detail: Dose Adjustment: 1, reason: Therapy De-escalation  Total # of Interventions Recommended: 1  Total # of Interventions Accepted: 1  Time Spent (min): 20

## 2024-06-03 ENCOUNTER — OFFICE VISIT (OUTPATIENT)
Dept: CARDIOLOGY | Facility: CLINIC | Age: 84
End: 2024-06-03
Payer: MEDICARE

## 2024-06-03 VITALS
BODY MASS INDEX: 30.29 KG/M2 | DIASTOLIC BLOOD PRESSURE: 76 MMHG | HEIGHT: 75 IN | WEIGHT: 243.6 LBS | SYSTOLIC BLOOD PRESSURE: 132 MMHG | HEART RATE: 72 BPM

## 2024-06-03 DIAGNOSIS — I42.0 DILATED CARDIOMYOPATHY (MULTI): Primary | ICD-10-CM

## 2024-06-03 DIAGNOSIS — Z79.899 HIGH RISK MEDICATION USE: ICD-10-CM

## 2024-06-03 DIAGNOSIS — Z79.01 ANTICOAGULANT LONG-TERM USE: ICD-10-CM

## 2024-06-03 DIAGNOSIS — I49.5 SINOATRIAL NODE DYSFUNCTION (MULTI): ICD-10-CM

## 2024-06-03 DIAGNOSIS — I48.21 ATRIAL FIBRILLATION, PERMANENT (MULTI): ICD-10-CM

## 2024-06-03 DIAGNOSIS — I44.1 ATRIOVENTRICULAR BLOCK, SECOND DEGREE: ICD-10-CM

## 2024-06-03 DIAGNOSIS — Z95.0 PRESENCE OF PERMANENT CARDIAC PACEMAKER: ICD-10-CM

## 2024-06-03 DIAGNOSIS — I25.10 ATHEROSCLEROSIS OF NATIVE CORONARY ARTERY OF NATIVE HEART WITHOUT ANGINA PECTORIS: ICD-10-CM

## 2024-06-03 DIAGNOSIS — Z86.79 H/O SICK SINUS SYNDROME: ICD-10-CM

## 2024-06-03 DIAGNOSIS — E78.2 MIXED HYPERLIPIDEMIA: ICD-10-CM

## 2024-06-03 PROCEDURE — 1036F TOBACCO NON-USER: CPT | Performed by: INTERNAL MEDICINE

## 2024-06-03 PROCEDURE — 99214 OFFICE O/P EST MOD 30 MIN: CPT | Performed by: INTERNAL MEDICINE

## 2024-06-03 PROCEDURE — 3078F DIAST BP <80 MM HG: CPT | Performed by: INTERNAL MEDICINE

## 2024-06-03 PROCEDURE — 3075F SYST BP GE 130 - 139MM HG: CPT | Performed by: INTERNAL MEDICINE

## 2024-06-03 PROCEDURE — 1159F MED LIST DOCD IN RCRD: CPT | Performed by: INTERNAL MEDICINE

## 2024-06-03 NOTE — PROGRESS NOTES
Patient:  Oc Enriquez  YOB: 1940  MRN: [id number]       HPI:       Oc Enriquez is a 84 y.o. male who returns today for cardiac follow-up.  He has a history of permanent atrial fibrillation being managed with a rate control strategy. He was previously on sotalol but had prolonged asymptomatic episodes of atrial fibrillation.  He has a history of sick sinus syndrome and complete heart block for which he underwent pacemaker implant in 2011. He had a generator change on September 27, 2019 (Swiftype Leah XT DR MRI device). He is pacemaker dependent. He has a history of mild coronary artery disease with cardiac catheterization in 2015 showing 40% distal LAD and otherwise trivial disease. He has normal left ventricular systolic function. He has a history of essential hypertension and hyperlipidemia. He had a pulmonary embolism and also a CVA in 1995. He has had recurrent TIAs with previous PFO closure. He is chronically anticoagulated with warfarin. He has obstructive sleep apnea for which he uses CPAP. He has chronic lower extremity edema. He has varicose veins and underwent previous laser vein therapy.      He was hospitalized at [facility] on May 24, 2022 for shortness of breath and chest discomfort. He was diagnosed with acute on chronic diastolic congestive heart failure. Chest x-ray showed vascular congestion. BNP was 3277. He was noted to have peripheral edema and amlodipine was discontinued. He complained of a dry cough and ACE inhibitor was changed to valsasrtan. He was given intravenous Lasix and had an approximate 5 L negative fluid balance. He was started on torsemide. He was also diagnosed with exacerbation of COPD. Echocardiogram on May 24, 2022 showed estimated LV ejection fraction 45 to 50%. There was distal inferior, distal septal, and apical hypokinesis. There was 1-2+ aortic regurgitation. 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 He was seen by Dr. Potter on June 20, 2022. He recommended continued medical therapy with plans to upgrade to a biventricular system if he has recurrent symptoms. CT scan of the chest May 24, 2022 was negative for pulmonary embolus. Myocardial perfusion study on June 30, 2022 was normal without ischemia or infarction. Calculated LV ejection fraction was 61%.   He was diagnosed with essential tremor and was placed on low-dose propranolol 40 mg twice daily.  This was subsequently increased to 80 mg twice daily.  His tremor significantly improved.  He was previously intolerant to amlodipine and HCTZ.       He continues to do well since his last visit.  He remains reasonably active.  He has 7.5 acres of property which he regularly mows with a zero turn mower.  He denies any chest pain or shortness of breath. He denies any orthopnea, PND, or increasing peripheral edema. He denies any palpitations, lightheadedness, near-syncope, or syncope. He denies any fever, chills, or cough. He denies any nausea, vomiting, or diaphoresis. He denies any hemoptysis, hematemesis, melena, or hematochezia.   Device check March 26, 2024 showed normal function.  100% burden of atrial fibrillation.  100% RV pacing.  Battery status 5.92 years.  Lab studies dated September 20, 2023 showed a hemoglobin 13.2 and hematocrit 41.2.  Comprehensive metabolic profile was normal.  Cholesterol 127 with HDL 44, LDL 70, and triglycerides 66.  His blood pressures are well-controlled.  He is currently free of any anginal or CHF symptoms.  He will continue on his same medications.  Other details as noted below.     The above portion of this note was dictated by me using voice recognition software. I personally performed the services described in the documentation. The scribe entering the documentation below was in my presence. I affirm that the information is both accurate and complete.       Objective:     Vitals:     06/03/24 1422   BP: 132/76   Pulse: 72       Wt Readings from Last 4 Encounters:   06/03/24 110 kg (243 lb 9.6 oz)   05/16/24 112 kg (247 lb)   11/27/23 114 kg (252 lb)   09/25/23 110 kg (243 lb)       Allergies:     Allergies   Allergen Reactions    Adhesive Tape-Silicones Unknown        Medications:     Current Outpatient Medications   Medication Instructions    acetaminophen (Tylenol Extra Strength) 500 mg tablet 1 tablet, oral, Every 4 hours PRN    ascorbic acid (Vitamin C) 1,000 mg tablet TAKE 1 TABLET TWO TO THREE TIMES WEEKLY    aspirin 81 mg EC tablet 1 tablet, oral, Daily    atorvastatin (LIPITOR) 10 mg, oral, Daily    cholecalciferol (Vitamin D-3) 5,000 Units tablet 1 tablet, oral, Daily    dilTIAZem ER (TIAZAC) 180 mg, oral, Daily    hydrALAZINE (Apresoline) 100 mg tablet 1 TABLET TWICE A DAY    magnesium oxide 400 mg magnesium capsule 1 capsule, oral, Daily    multivitamin (Daily Multi-Vitamin) tablet 1 tablet, oral, Daily    nitroglycerin (Nitrostat) 0.4 mg SL tablet sublingual    propranolol (INDERAL) 80 mg, oral, Every 12 hours    tamsulosin (Flomax) 0.4 mg 24 hr capsule 1 tablet, oral, Daily    trospium (Sanctura) 20 mg tablet 1 tablet, oral, 2 times daily    valsartan (DIOVAN) 320 mg, oral, Daily    vitamin E acid succinate (vitamin E succinate) 268 mg (400 unit) tablet 1 tablet, oral, Daily    warfarin (Jantoven) 5 mg tablet 1 tablet, oral, Daily       Physical Examination:   GENERAL:  Well developed, well nourished, in no acute distress.  CHEST:  Symmetric and nontender.  Pacemaker device noted.  NEURO/PSYCH:  Alert and oriented times three with approppriate behavior and responses.  NECK:  Supple, no JVD, no bruit.  LUNGS:  Clear to auscultation bilaterally, normal respiratory effort.  HEART:  Rate and rhythm regular with no evident murmur, no gallop appreciated.        There are no rubs, clicks or heaves.  EXTREMITIES:  Warm with good color, no clubbing or cyanosis.  There is no edema  noted.  PERIPHERAL VASCULAR:  Pulses present and equally palpable; 2+ throughout.      Lab:     CBC:   Lab Results   Component Value Date    WBC 5.1 09/22/2023    RBC 4.48 (L) 09/22/2023    HGB 13.2 (L) 09/22/2023    HCT 41.2 09/22/2023     09/22/2023        CMP:    Lab Results   Component Value Date     09/22/2023    K 4.4 09/22/2023     09/22/2023    CO2 32 09/22/2023    BUN 12 09/22/2023    CREATININE 1.00 09/22/2023    GLUCOSE 98 09/22/2023    CALCIUM 9.2 09/22/2023       Lipid Profile:    Lab Results   Component Value Date    TRIG 66 09/22/2023    HDL 43.4 09/22/2023       PT/INR:    Lab Results   Component Value Date    PROTIME 22.3 (H) 09/13/2019    INR 2.0 (H) 09/13/2019       HgBA1c:    Lab Results   Component Value Date    HGBA1C 5.9 (H) 06/12/2023       BMP:  Lab Results   Component Value Date     09/22/2023     01/19/2023     01/07/2022    K 4.4 09/22/2023    K 4.1 01/19/2023    K 4.4 01/07/2022     09/22/2023     01/19/2023     01/07/2022    CO2 32 09/22/2023    CO2 33 (H) 01/19/2023    CO2 31 01/07/2022    BUN 12 09/22/2023    BUN 17 01/19/2023    BUN 20 01/07/2022    CREATININE 1.00 09/22/2023    CREATININE 1.11 01/19/2023    CREATININE 0.93 01/07/2022       CBC:  Lab Results   Component Value Date    WBC 5.1 09/22/2023    WBC 5.1 01/19/2023    WBC 5.8 01/07/2022    RBC 4.48 (L) 09/22/2023    RBC 4.66 01/19/2023    RBC 5.03 01/07/2022    HGB 13.2 (L) 09/22/2023    HGB 13.5 01/19/2023    HGB 14.6 01/07/2022    HCT 41.2 09/22/2023    HCT 41.5 01/19/2023    HCT 45.8 01/07/2022    MCV 92 09/22/2023    MCV 89 01/19/2023    MCV 91 01/07/2022    MCHC 32.0 09/22/2023    MCHC 32.5 01/19/2023    MCHC 31.9 (L) 01/07/2022    RDW 14.2 09/22/2023    RDW 13.9 01/19/2023    RDW 13.5 01/07/2022     09/22/2023     01/19/2023     01/07/2022       Hepatic Function Panel:    Lab Results   Component Value Date    ALKPHOS 43 09/22/2023    ALT 17  09/22/2023    AST 23 09/22/2023    PROT 7.0 09/22/2023    BILITOT 1.0 09/22/2023       Diagnostic Studies:     === 06/30/22 ===    - Impression -  Normal Lexiscan Myoview cardiac perfusion stress test.  No evidence of ischemia or myocardial infarction by perfusion imaging.  Normal left ventricular systolic function, ejection fraction 61%.  Low risk Lexiscan Myoview cardiac perfusion stress test.  Comparison study dated March 9, 2012 was negative for ischemia or  infarction. Calculated LV ejection fraction 68%.      Problem List:     Patient Active Problem List   Diagnosis    Accelerated essential hypertension    Atherosclerosis of native coronary artery of native heart without angina pectoris    Atrial fibrillation, permanent (Multi)    Atrioventricular block, second degree    Congestive heart failure, chronic, left-sided (Multi)    Dilated cardiomyopathy (Multi)    H/O sick sinus syndrome    Hyperlipidemia    Palpitations    Presence of permanent cardiac pacemaker    Pulmonary embolism (Multi)    Sinoatrial node dysfunction (Multi)    Sleep apnea    SOB (shortness of breath) on exertion    Third degree AV block (Multi)    Anticoagulant long-term use    High risk medication use       Asessment:     Problem List Items Addressed This Visit             ICD-10-CM    Atherosclerosis of native coronary artery of native heart without angina pectoris I25.10    Relevant Orders    Follow Up In Cardiology    CBC    Comprehensive metabolic panel    Lipid panel    Atrial fibrillation, permanent (Multi) I48.21    Relevant Orders    Follow Up In Cardiology    CBC    Comprehensive metabolic panel    Lipid panel    Atrioventricular block, second degree I44.1    Relevant Orders    Follow Up In Cardiology    Dilated cardiomyopathy (Multi) - Primary I42.0    H/O sick sinus syndrome Z86.79    Relevant Orders    Follow Up In Cardiology    CBC    Comprehensive metabolic panel    Lipid panel    Hyperlipidemia E78.5    Relevant Orders     Follow Up In Cardiology    CBC    Comprehensive metabolic panel    Lipid panel    Presence of permanent cardiac pacemaker Z95.0    Relevant Orders    Follow Up In Cardiology    CBC    Comprehensive metabolic panel    Lipid panel    Sinoatrial node dysfunction (Multi) I49.5    Relevant Orders    Follow Up In Cardiology    Anticoagulant long-term use Z79.01    Relevant Orders    Follow Up In Cardiology    CBC    Comprehensive metabolic panel    Lipid panel    High risk medication use Z79.899    Relevant Orders    Follow Up In Cardiology    CBC    Comprehensive metabolic panel    Lipid panel

## 2024-06-21 NOTE — PROGRESS NOTES
[address] [address]                            PREOPERATIVE H&P      PATIENT NAME: BOOKER BARGER                : 1968  MED REC NO: [id number]                       ROOM: [facility]                                               (General) POOL                                               RM    ACCOUNT NO: [id number]                       ADMIT DATE: 2024  PROVIDER: Cam Hudson MD      CHIEF COMPLAINT:  Left lower leg lipoma.    HISTORY OF PRESENT ILLNESS:  The patient is a 56-year-old female with long history of a small lump in the left lower leg, now causing her discomfort.  She has numbness that goes down below this and actually up the leg she states.  She has been told it is likely a lipoma.  She has had arterial and venous Doppler studies of the leg in the past that were within normal limits.  She is being admitted at this time for excision of this mass due to the pain that is causing.    PAST MEDICAL HISTORY:  Negative for any known coronary artery disease, hypertension, lung abnormalities.  She actually has a history of venous insufficiency and hypoglycemia.    MEDICATIONS:  Pepcid and multivitamins.    ALLERGIES:  NONE.      REVIEW OF SYSTEMS:  10-point review of systems is negative.    PHYSICAL EXAMINATION:  GENERAL:  The patient is a 56-year-old female, appears her age.  HEAD, EARS, EYES, NOSE, AND THROAT:  Show no scleral icterus.  CARDIOVASCULAR:  S1, S2.  LUNGS:  Respirations are clear.  ABDOMEN:  Soft.  EXTREMITIES:  In the lower leg, she has an increasingly tender mass.  It appears to be a lipoma and present for quite a long period of time.  She is being admitted at this time for excision of same.        CAM HUDSON MD    D:  2024 22:29:10     T:  2024 02:07:46     KEENAN/TALIB  Job #:  [id number]     Doc#:  [id number]      Warfarin Dosing - Pharmacy Consult Note  Consulting Provider: Dr. Cindy Dash     Indication:   history of CVA with PFO  and a fib   Warfarin Dose prior to admission: warfarin 2.5mg Monday and Friday, warfarin 5mg Sunday, Tuesday, Wednesday, Thursday, Saturday, for 30mg TWD   Concurrent anticoagulants/antiplatelets: none at this time   Significant Drug Interactions: No obvious interactions: ASA, APAP  Recent Labs     05/24/22  0015 05/25/22  0504   INR 1.9 2.3   HGB 13.3* 13.3*    169   LABALBU 4.1  --      Recent warfarin administrations        No warfarin orders with administrations found. Date      INR       Dose  5/24/22     1.9        6 mg   5/25/22     2.3        5 mg     Assessment/Plan  (Goal INR: 2 - 3)  INR of 2.3 is therapeutic for goal range of 2-3 for history of CVA with PFO and atrial fibrillation. As such, will give a give normal home dose of warfarin 5mg x today. Active problem list reviewed. INR orders are placed. Chart reviewed for pertinent labs, drug/diet interactions, and past doses. Documentation of patient's clinical condition was reviewed. Pharmacy Dosing:  Pharmacy will continue to follow.      Deliah Merlin, [id number] [facility] PharmD

## 2024-07-01 ENCOUNTER — HOSPITAL ENCOUNTER (OUTPATIENT)
Dept: CARDIOLOGY | Age: 84
Discharge: HOME | End: 2024-07-01
Payer: MEDICARE

## 2024-07-01 PROCEDURE — 93296 REM INTERROG EVL PM/IDS: CPT

## 2024-07-01 PROCEDURE — 93294 REM INTERROG EVL PM/LDLS PM: CPT | Performed by: INTERNAL MEDICINE

## 2024-07-12 ENCOUNTER — HOSPITAL ENCOUNTER (OUTPATIENT)
Dept: PHARMACY | Age: 84
Setting detail: THERAPIES SERIES
Discharge: HOME | End: 2024-07-12
Payer: MEDICARE

## 2024-07-12 DIAGNOSIS — T81.718D IATROGENIC PULMONARY EMBOLISM, SUBSEQUENT ENCOUNTER (HCC): Primary | ICD-10-CM

## 2024-07-12 DIAGNOSIS — I26.99 IATROGENIC PULMONARY EMBOLISM, SUBSEQUENT ENCOUNTER (HCC): Primary | ICD-10-CM

## 2024-07-12 LAB
INTERNATIONAL NORMALIZATION RATIO, POC: 2.8
PROTHROMBIN TIME, POC: NORMAL

## 2024-07-12 PROCEDURE — 85610 PROTHROMBIN TIME: CPT | Performed by: PHARMACIST

## 2024-07-12 PROCEDURE — 99211 OFF/OP EST MAY X REQ PHY/QHP: CPT | Performed by: PHARMACIST

## 2024-07-12 NOTE — PROGRESS NOTES
Mr. Ian Bates is a 84 y.o. y/o male with history of PE who presents today for anticoagulation monitoring and adjustment.    INR 2.8 is therapeutic for this patient (goal range 2-3) and is reflective of 35 mg TWD  Patient verifies current dosing regimen, patient able to verbally recall dose  Patient reports no missed doses since last INR   Patient has sufficient quantity of medication/prescription    Patient denies s/sx clotting and/or stroke  Patient denies hematuria, epistaxis, rectal bleeding    Patient has been eating more salads; no changes in alcohol, or tobacco use    Reviewed medication list. Patient reports using less Tylenol.  No changes in maintenance meds.    Patient reports \"his balance has been off\".  He has not fallen and he is using a walker for assistance.  States he is not \"light-headed\" and not experiencing any dizziness.  States he has not changed any blood pressure medications (long-term use of same medications) and is not experiencing balance difficulties upon rising.    Has an appointment scheduled with physician on 24 for this concern.    Patient denies any pending medical or dental procedures scheduled at this time      Patient was instructed to maintain current regimen of 35mg TWD and RTC 6 weeks    For Pharmacy Admin Tracking Only    Intervention Detail: Adherence Monitorin  Total # of Interventions Recommended: 0  Total # of Interventions Accepted: 0  Time Spent (min): 15  Mariaelena Hay [facility]  2024  8:18 AM

## 2024-08-23 ENCOUNTER — ANTI-COAG VISIT (OUTPATIENT)
Age: 84
End: 2024-08-23
Payer: MEDICARE

## 2024-08-23 DIAGNOSIS — T81.718D IATROGENIC PULMONARY EMBOLISM, SUBSEQUENT ENCOUNTER (HCC): Primary | ICD-10-CM

## 2024-08-23 DIAGNOSIS — I26.99 IATROGENIC PULMONARY EMBOLISM, SUBSEQUENT ENCOUNTER (HCC): Primary | ICD-10-CM

## 2024-08-23 LAB
INTERNATIONAL NORMALIZATION RATIO, POC: 3.2
PROTHROMBIN TIME, POC: 0

## 2024-08-23 PROCEDURE — 99212 OFFICE O/P EST SF 10 MIN: CPT

## 2024-08-23 PROCEDURE — 85610 PROTHROMBIN TIME: CPT

## 2024-08-23 NOTE — PROGRESS NOTES
Mr. Ian Bates is a 84 y.o. y/o male with history of PE who presents today for anticoagulation monitoring and adjustment.  INR 3.2 is sl supra-therapeutic for this patient (goal range 2-3) and is reflective of 35 mg TWD  Patient verifies current dosing regimen, patient able to verbally recall dose  Patient reports 1 missed doses since last INR 3 weeks ago for dental work  Patient denies s/sx clotting and/or stroke  Patient denies hematuria, epistaxis, rectal bleeding  Patient denies changes in diet, alcohol, or tobacco use  Reviewed medication list and drug allergies with patient, updated any medication additions or modifications accordingly. INR's have been running at the high end of range-recommended pt add 1 extra serving green vegetables weekly. PT is having balance problems and in therapy. Counseled on falls risks with warfarin  Patient also denies any pending medical or dental procedures scheduled at this time  Patient was instructed to HOLD today then continue 35 mg TWD and RTC 6 weeks          For Pharmacy Admin Tracking Only    Intervention Detail: Dose Adjustment: 1, reason: Therapy De-escalation  Total # of Interventions Recommended: 1  Total # of Interventions Accepted: 1  Time Spent (min): 15

## 2024-10-01 ENCOUNTER — HOSPITAL ENCOUNTER (OUTPATIENT)
Dept: CARDIOLOGY | Age: 84
Discharge: HOME | End: 2024-10-01
Payer: MEDICARE

## 2024-10-01 PROCEDURE — 93280 PM DEVICE PROGR EVAL DUAL: CPT

## 2024-10-01 PROCEDURE — 93280 PM DEVICE PROGR EVAL DUAL: CPT | Performed by: INTERNAL MEDICINE

## 2024-10-04 ENCOUNTER — ANTI-COAG VISIT (OUTPATIENT)
Age: 84
End: 2024-10-04
Payer: MEDICARE

## 2024-10-04 DIAGNOSIS — T81.718S: Primary | ICD-10-CM

## 2024-10-04 DIAGNOSIS — I26.99: Primary | ICD-10-CM

## 2024-10-04 LAB
INTERNATIONAL NORMALIZATION RATIO, POC: 4.7
PROTHROMBIN TIME, POC: 0

## 2024-10-04 PROCEDURE — 99213 OFFICE O/P EST LOW 20 MIN: CPT

## 2024-10-04 PROCEDURE — 85610 PROTHROMBIN TIME: CPT

## 2024-10-04 NOTE — PROGRESS NOTES
Mr. Ian Bates is a 84 y.o. y/o male with history of PE who presents today for anticoagulation monitoring and adjustment.    INR 4.7 is supratherapeutic for this patient (goal range 2.0-3.0) and is reflective of 35 mg TWD (5 mg daily)    Patient verifies current dosing regimen, patient able to verbally recall dose  Patient reports 0 missed doses since last INR   Patient denies s/sx clotting and/or stroke  Patient denies hematuria, epistaxis, rectal bleeding  Patient reports small hangnail cut bled (oozed) for over 24 hours last week  Patient denies changes in diet, alcohol, or tobacco use  Reviewed medication list and drug allergies with patient, updated any medication additions or modifications accordingly  Patient also denies any pending medical or dental procedures scheduled at this time  Patient reports fall 2 weeks ago - sought medical attention and has follow-up with PCP next week  Denies head strike    Patient was instructed to hold today's dose and decrease Saturday doses to 2.5 mg (32.5 mg TWD, a 7.1% decrease) and RTC in 3 weeks    Donavan Gray, PharmD  PGY-1 Pharmacy Resident  10/4/2024 7:53 AM      For Pharmacy Admin Tracking Only    Intervention Detail: Adherence Monitorin and Dose Adjustment: 2, reason: Improve Adherence, Therapy De-escalation  Total # of Interventions Recommended: 3  Total # of Interventions Accepted: 3  Time Spent (min): 15

## 2024-10-14 DIAGNOSIS — I26.99 IATROGENIC PULMONARY EMBOLISM, INITIAL ENCOUNTER (HCC): Primary | ICD-10-CM

## 2024-10-14 DIAGNOSIS — T81.718A IATROGENIC PULMONARY EMBOLISM, INITIAL ENCOUNTER (HCC): Primary | ICD-10-CM

## 2024-10-25 ENCOUNTER — ANTI-COAG VISIT (OUTPATIENT)
Age: 84
End: 2024-10-25
Payer: MEDICARE

## 2024-10-25 DIAGNOSIS — T81.718D IATROGENIC PULMONARY EMBOLISM, SUBSEQUENT ENCOUNTER (HCC): Primary | ICD-10-CM

## 2024-10-25 DIAGNOSIS — I26.99 IATROGENIC PULMONARY EMBOLISM, SUBSEQUENT ENCOUNTER (HCC): Primary | ICD-10-CM

## 2024-10-25 LAB
INTERNATIONAL NORMALIZATION RATIO, POC: 3.5
PROTHROMBIN TIME, POC: 0

## 2024-10-25 PROCEDURE — 85610 PROTHROMBIN TIME: CPT | Performed by: PHARMACIST

## 2024-10-25 PROCEDURE — 99212 OFFICE O/P EST SF 10 MIN: CPT | Performed by: PHARMACIST

## 2024-10-25 NOTE — PROGRESS NOTES
Mr. Ian Bates is a 84 y.o. y/o male with history of PE who presents today for anticoagulation monitoring and adjustment.  INR 3.5 is supratherapeutic for this patient (goal range 2-3) and is reflective of 32.5 mg TWD  Patient verifies current dosing regimen, patient able to verbally recall dose  Patient reports no  missed doses since last INR   Patient denies s/sx clotting and/or stroke  Patient denies hematuria, epistaxis, rectal bleeding  Patient denies changes in diet, alcohol, or tobacco use  Reviewed medication list and drug allergies with patient, updated any medication additions or modifications accordingly  Patient also denies any pending medical or dental procedures scheduled at this time  Patient was instructed to hold dose today then continue 32.5mg TWD and RTC 4 weeks        For Pharmacy Admin Tracking Only    Intervention Detail: Dose Adjustment: 1, reason: Therapy De-escalation  Total # of Interventions Recommended: 1  Total # of Interventions Accepted: 1  Time Spent (min): 20

## 2024-11-21 ENCOUNTER — ANTI-COAG VISIT (OUTPATIENT)
Age: 84
End: 2024-11-21
Payer: MEDICARE

## 2024-11-21 DIAGNOSIS — T81.718D IATROGENIC PULMONARY EMBOLISM, SUBSEQUENT ENCOUNTER (HCC): Primary | ICD-10-CM

## 2024-11-21 DIAGNOSIS — I26.99 IATROGENIC PULMONARY EMBOLISM, SUBSEQUENT ENCOUNTER (HCC): Primary | ICD-10-CM

## 2024-11-21 LAB
INTERNATIONAL NORMALIZATION RATIO, POC: 2.5
PROTHROMBIN TIME, POC: 0

## 2024-11-21 PROCEDURE — 85610 PROTHROMBIN TIME: CPT

## 2024-11-21 PROCEDURE — 99211 OFF/OP EST MAY X REQ PHY/QHP: CPT

## 2024-11-21 NOTE — PROGRESS NOTES
Mr. Ian Bates is a 84 y.o. y/o male with history of PE who presents today for anticoagulation monitoring and adjustment.  INR 2.5 is therapeutic for this patient (goal range 2-3) and is reflective of 32.5 mg TWD  Patient verifies current dosing regimen, patient able to verbally recall dose  Patient reports 0  missed doses since last INR   Patient denies s/sx clotting and/or stroke  Patient denies hematuria, epistaxis, rectal bleeding  Patient denies changes in diet, alcohol, or tobacco use  Reviewed medication list and drug allergies with patient, updated any medication additions or modifications accordingly  Patient also denies any pending medical or dental procedures scheduled at this time  Patient was instructed to continue 32.5 mg TWD and RTC 6 weeks          For Pharmacy Admin Tracking Only    Intervention Detail: Adherence Monitorin  Total # of Interventions Recommended: 1  Total # of Interventions Accepted: 1  Time Spent (min): 15

## 2024-11-25 ENCOUNTER — LAB (OUTPATIENT)
Dept: LAB | Facility: LAB | Age: 84
End: 2024-11-25
Payer: MEDICARE

## 2024-11-25 DIAGNOSIS — I48.21 ATRIAL FIBRILLATION, PERMANENT (MULTI): ICD-10-CM

## 2024-11-25 DIAGNOSIS — Z79.899 HIGH RISK MEDICATION USE: ICD-10-CM

## 2024-11-25 DIAGNOSIS — E78.2 MIXED HYPERLIPIDEMIA: ICD-10-CM

## 2024-11-25 DIAGNOSIS — Z86.79 H/O SICK SINUS SYNDROME: ICD-10-CM

## 2024-11-25 DIAGNOSIS — Z79.01 ANTICOAGULANT LONG-TERM USE: ICD-10-CM

## 2024-11-25 DIAGNOSIS — I49.5 SINOATRIAL NODE DYSFUNCTION (MULTI): ICD-10-CM

## 2024-11-25 DIAGNOSIS — Z95.0 PRESENCE OF PERMANENT CARDIAC PACEMAKER: ICD-10-CM

## 2024-11-25 DIAGNOSIS — I44.1 ATRIOVENTRICULAR BLOCK, SECOND DEGREE: ICD-10-CM

## 2024-11-25 DIAGNOSIS — I25.10 ATHEROSCLEROSIS OF NATIVE CORONARY ARTERY OF NATIVE HEART WITHOUT ANGINA PECTORIS: ICD-10-CM

## 2024-11-25 LAB
ALBUMIN SERPL BCP-MCNC: 4.2 G/DL (ref 3.4–5)
ALP SERPL-CCNC: 47 U/L (ref 33–136)
ALT SERPL W P-5'-P-CCNC: 18 U/L (ref 10–52)
ANION GAP SERPL CALC-SCNC: 11 MMOL/L (ref 10–20)
AST SERPL W P-5'-P-CCNC: 20 U/L (ref 9–39)
BILIRUB SERPL-MCNC: 1 MG/DL (ref 0–1.2)
BUN SERPL-MCNC: 22 MG/DL (ref 6–23)
CALCIUM SERPL-MCNC: 9 MG/DL (ref 8.6–10.3)
CHLORIDE SERPL-SCNC: 104 MMOL/L (ref 98–107)
CHOLEST SERPL-MCNC: 149 MG/DL (ref 0–199)
CHOLESTEROL/HDL RATIO: 3.6
CO2 SERPL-SCNC: 32 MMOL/L (ref 21–32)
CREAT SERPL-MCNC: 1.13 MG/DL (ref 0.5–1.3)
EGFRCR SERPLBLD CKD-EPI 2021: 64 ML/MIN/1.73M*2
ERYTHROCYTE [DISTWIDTH] IN BLOOD BY AUTOMATED COUNT: 14.5 % (ref 11.5–14.5)
GLUCOSE SERPL-MCNC: 113 MG/DL (ref 74–99)
HCT VFR BLD AUTO: 38.9 % (ref 41–52)
HDLC SERPL-MCNC: 41.4 MG/DL
HGB BLD-MCNC: 12.6 G/DL (ref 13.5–17.5)
LDLC SERPL CALC-MCNC: 85 MG/DL
MCH RBC QN AUTO: 30 PG (ref 26–34)
MCHC RBC AUTO-ENTMCNC: 32.4 G/DL (ref 32–36)
MCV RBC AUTO: 93 FL (ref 80–100)
NON HDL CHOLESTEROL: 108 MG/DL (ref 0–149)
NRBC BLD-RTO: 0 /100 WBCS (ref 0–0)
PLATELET # BLD AUTO: 208 X10*3/UL (ref 150–450)
POTASSIUM SERPL-SCNC: 4.4 MMOL/L (ref 3.5–5.3)
PROT SERPL-MCNC: 6.5 G/DL (ref 6.4–8.2)
RBC # BLD AUTO: 4.2 X10*6/UL (ref 4.5–5.9)
SODIUM SERPL-SCNC: 143 MMOL/L (ref 136–145)
TRIGL SERPL-MCNC: 112 MG/DL (ref 0–149)
VLDL: 22 MG/DL (ref 0–40)
WBC # BLD AUTO: 6.1 X10*3/UL (ref 4.4–11.3)

## 2024-11-25 PROCEDURE — 36415 COLL VENOUS BLD VENIPUNCTURE: CPT

## 2024-11-25 PROCEDURE — 80061 LIPID PANEL: CPT

## 2024-11-25 PROCEDURE — 80053 COMPREHEN METABOLIC PANEL: CPT

## 2024-11-25 PROCEDURE — 85027 COMPLETE CBC AUTOMATED: CPT

## 2024-11-26 ENCOUNTER — TELEPHONE (OUTPATIENT)
Dept: CARDIOLOGY | Facility: CLINIC | Age: 84
End: 2024-11-26
Payer: MEDICARE

## 2024-11-26 NOTE — TELEPHONE ENCOUNTER
----- Message from Emery Bragg sent at 11/25/2024  4:52 PM EST -----  Labs reviewed and look fine.  Similar to previous results.  Continue same and follow-up as scheduled.  Thanks.

## 2024-11-26 NOTE — TELEPHONE ENCOUNTER
Called and was unable to get a hold of the patient. Voicemail was verified. Left detailed voicemail in regards to message.

## 2024-12-02 ENCOUNTER — APPOINTMENT (OUTPATIENT)
Dept: CARDIOLOGY | Facility: CLINIC | Age: 84
End: 2024-12-02
Payer: MEDICARE

## 2024-12-02 VITALS
HEIGHT: 75 IN | HEART RATE: 88 BPM | BODY MASS INDEX: 30.71 KG/M2 | DIASTOLIC BLOOD PRESSURE: 78 MMHG | SYSTOLIC BLOOD PRESSURE: 122 MMHG | WEIGHT: 247 LBS

## 2024-12-02 DIAGNOSIS — Z86.79 H/O SICK SINUS SYNDROME: ICD-10-CM

## 2024-12-02 DIAGNOSIS — I25.10 ATHEROSCLEROSIS OF NATIVE CORONARY ARTERY OF NATIVE HEART WITHOUT ANGINA PECTORIS: ICD-10-CM

## 2024-12-02 DIAGNOSIS — I49.5 SINOATRIAL NODE DYSFUNCTION (MULTI): ICD-10-CM

## 2024-12-02 DIAGNOSIS — Z95.0 PRESENCE OF PERMANENT CARDIAC PACEMAKER: ICD-10-CM

## 2024-12-02 DIAGNOSIS — Z79.01 ANTICOAGULANT LONG-TERM USE: ICD-10-CM

## 2024-12-02 DIAGNOSIS — I48.21 ATRIAL FIBRILLATION, PERMANENT (MULTI): ICD-10-CM

## 2024-12-02 DIAGNOSIS — I44.1 ATRIOVENTRICULAR BLOCK, SECOND DEGREE: ICD-10-CM

## 2024-12-02 DIAGNOSIS — E78.2 MIXED HYPERLIPIDEMIA: ICD-10-CM

## 2024-12-02 DIAGNOSIS — Z79.899 HIGH RISK MEDICATION USE: ICD-10-CM

## 2024-12-02 PROCEDURE — 3074F SYST BP LT 130 MM HG: CPT | Performed by: INTERNAL MEDICINE

## 2024-12-02 PROCEDURE — 99214 OFFICE O/P EST MOD 30 MIN: CPT | Performed by: INTERNAL MEDICINE

## 2024-12-02 PROCEDURE — 3078F DIAST BP <80 MM HG: CPT | Performed by: INTERNAL MEDICINE

## 2024-12-02 NOTE — PROGRESS NOTES
Patient:  Oc Enriquez  YOB: 1940  MRN: [id number]       HPI:       Oc Enriquez is a 84 y.o. male who returns today for cardiac follow-up.   He has a history of permanent atrial fibrillation being managed with a rate control strategy. He was previously on sotalol but had prolonged asymptomatic episodes of atrial fibrillation.  He has a history of sick sinus syndrome and complete heart block for which he underwent pacemaker implant in 2011. He had a generator change on September 27, 2019 ([employer] Leah XT DR MRI device). He is pacemaker dependent. He has a history of mild coronary artery disease with cardiac catheterization in 2015 showing 40% distal LAD and otherwise trivial disease. He has normal left ventricular systolic function. He has a history of essential hypertension and hyperlipidemia. He had a pulmonary embolism and also a CVA in 1995. He has had recurrent TIAs with previous PFO closure. He is chronically anticoagulated with warfarin. He has obstructive sleep apnea for which he uses CPAP. He has chronic lower extremity edema. He has varicose veins and underwent previous laser vein therapy.      He was hospitalized at [facility] on May 24, 2022 for shortness of breath and chest discomfort. He was diagnosed with acute on chronic diastolic congestive heart failure. Chest x-ray showed vascular congestion. BNP was 3277. He was noted to have peripheral edema and amlodipine was discontinued. He complained of a dry cough and ACE inhibitor was changed to valsasrtan. He was given intravenous Lasix and had an approximate 5 L negative fluid balance. He was started on torsemide. He was also diagnosed with exacerbation of COPD. Echocardiogram on May 24, 2022 showed estimated LV ejection fraction 45 to 50%. There was distal inferior, distal septal, and apical hypokinesis. There was 1-2+ aortic regurgitation. 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 He was seen by Dr. Potter on June 20, 2022. He recommended continued medical therapy with plans to upgrade to a biventricular system if he has recurrent symptoms. CT scan of the chest May 24, 2022 was negative for pulmonary embolus. Myocardial perfusion study on June 30, 2022 was normal without ischemia or infarction. Calculated LV ejection fraction was 61%.   He was diagnosed with essential tremor and was placed on low-dose propranolol 40 mg twice daily.  This was subsequently increased to 80 mg twice daily.  His tremor significantly improved.  He was previously intolerant to amlodipine and HCTZ.       He has been doing well since his last visit.  He remains active.  He has 7.5 acres of land to attend to.  He denies any chest pain or shortness of breath. He denies any orthopnea, PND, or increasing peripheral edema. He denies any palpitations, lightheadedness, near-syncope, or syncope. He denies any fever, chills, or cough. He denies any nausea, vomiting, or diaphoresis. He denies any hemoptysis, hematemesis, melena, or hematochezia.   Pacemaker device check October 1, 2024 showed normal function.  Atrial fibrillation burden 100%.  Battery status 5 years.   Lab studies November 25, 2024 were reviewed and showed a normal CBC with exception of hemoglobin 12.6 and hematocrit 30.9.  Comprehensive metabolic profile was normal with exception of glucose 113.  Cholesterol 149 with HDL 41, LDL 85, and triglycerides 112.  His blood pressures are well-controlled.  He is currently free of any anginal or CHF symptoms.  He will continue on warfarin.  His INRs have generally been in normal range.  Continue propranolol, Tiazac, hydralazine, and valsartan.  Continue aspirin and atorvastatin.  Other details as noted below.     The above portion of this note was dictated by me using voice recognition software. I personally performed the services described in the documentation. The scribe entering  the documentation below was in my presence. I affirm that the information is both accurate and complete.       Objective:     Vitals:    12/02/24 1415   BP: 122/78   Pulse: 88       Wt Readings from Last 4 Encounters:   06/03/24 110 kg (243 lb 9.6 oz)   05/16/24 112 kg (247 lb)   11/27/23 114 kg (252 lb)   09/25/23 110 kg (243 lb)       Allergies:     Allergies   Allergen Reactions    Adhesive Tape-Silicones Unknown          Medications:     Current Outpatient Medications   Medication Instructions    acetaminophen (Tylenol Extra Strength) 500 mg tablet 1 tablet, oral, Every 4 hours PRN    ascorbic acid (Vitamin C) 1,000 mg tablet TAKE 1 TABLET TWO TO THREE TIMES WEEKLY    aspirin 81 mg EC tablet 1 tablet, oral, Daily    atorvastatin (LIPITOR) 10 mg, oral, Daily    cholecalciferol (Vitamin D-3) 5,000 Units tablet 1 tablet, oral, Daily    dilTIAZem ER (TIAZAC) 180 mg, oral, Daily    hydrALAZINE (Apresoline) 100 mg tablet 1 TABLET TWICE A DAY    magnesium oxide 400 mg magnesium capsule 1 capsule, oral, Daily    multivitamin (Daily Multi-Vitamin) tablet 1 tablet, oral, Daily    nitroglycerin (Nitrostat) 0.4 mg SL tablet sublingual    propranolol (INDERAL) 80 mg, oral, Every 12 hours    tamsulosin (Flomax) 0.4 mg 24 hr capsule 1 tablet, oral, Daily    trospium (Sanctura) 20 mg tablet 1 tablet, oral, 2 times daily    valsartan (DIOVAN) 320 mg, oral, Daily    vitamin E acid succinate (vitamin E succinate) 268 mg (400 unit) tablet 1 tablet, oral, Daily    warfarin (Jantoven) 5 mg tablet 1 tablet, oral, Daily       Physical Examination:   GENERAL:  Well developed, well nourished, in no acute distress.  CHEST:  Symmetric and nontender.  NEURO/PSYCH:  Alert and oriented times three with approppriate behavior and responses.  NECK:  Supple, no JVD, no bruit.  LUNGS:  Clear to auscultation bilaterally, normal respiratory effort.  HEART:  Rate and rhythm regular, paced, with no evident murmur, no gallop appreciated.        There  are no rubs, clicks or heaves.  EXTREMITIES:  Warm with good color, no clubbing or cyanosis.  There is no edema noted.  PERIPHERAL VASCULAR:  Pulses present and equally palpable; 2+ throughout.      Lab:     CBC:   Lab Results   Component Value Date    WBC 6.1 11/25/2024    RBC 4.20 (L) 11/25/2024    HGB 12.6 (L) 11/25/2024    HCT 38.9 (L) 11/25/2024     11/25/2024        CMP:    Lab Results   Component Value Date     11/25/2024    K 4.4 11/25/2024     11/25/2024    CO2 32 11/25/2024    BUN 22 11/25/2024    CREATININE 1.13 11/25/2024    GLUCOSE 113 (H) 11/25/2024    CALCIUM 9.0 11/25/2024       Lipid Profile:    Lab Results   Component Value Date    TRIG 112 11/25/2024    HDL 41.4 11/25/2024    LDLCALC 85 11/25/2024       BMP:  Lab Results   Component Value Date     11/25/2024     09/22/2023     01/19/2023     01/07/2022    K 4.4 11/25/2024    K 4.4 09/22/2023    K 4.1 01/19/2023    K 4.4 01/07/2022     11/25/2024     09/22/2023     01/19/2023     01/07/2022    CO2 32 11/25/2024    CO2 32 09/22/2023    CO2 33 (H) 01/19/2023    CO2 31 01/07/2022    BUN 22 11/25/2024    BUN 12 09/22/2023    BUN 17 01/19/2023    BUN 20 01/07/2022    CREATININE 1.13 11/25/2024    CREATININE 1.00 09/22/2023    CREATININE 1.11 01/19/2023    CREATININE 0.93 01/07/2022       CBC:  Lab Results   Component Value Date    WBC 6.1 11/25/2024    WBC 5.1 09/22/2023    WBC 5.1 01/19/2023    WBC 5.8 01/07/2022    RBC 4.20 (L) 11/25/2024    RBC 4.48 (L) 09/22/2023    RBC 4.66 01/19/2023    RBC 5.03 01/07/2022    HGB 12.6 (L) 11/25/2024    HGB 13.2 (L) 09/22/2023    HGB 13.5 01/19/2023    HGB 14.6 01/07/2022    HCT 38.9 (L) 11/25/2024    HCT 41.2 09/22/2023    HCT 41.5 01/19/2023    HCT 45.8 01/07/2022    MCV 93 11/25/2024    MCV 92 09/22/2023    MCV 89 01/19/2023    MCV 91 01/07/2022    MCH 30.0 11/25/2024    MCHC 32.4 11/25/2024    MCHC 32.0 09/22/2023    MCHC 32.5 01/19/2023    MCHC  31.9 (L) 01/07/2022    RDW 14.5 11/25/2024    RDW 14.2 09/22/2023    RDW 13.9 01/19/2023    RDW 13.5 01/07/2022     11/25/2024     09/22/2023     01/19/2023     01/07/2022       Hepatic Function Panel:    Lab Results   Component Value Date    ALKPHOS 47 11/25/2024    ALT 18 11/25/2024    AST 20 11/25/2024    PROT 6.5 11/25/2024    BILITOT 1.0 11/25/2024       HgBA1c:    Lab Results   Component Value Date    HGBA1C 5.9 (H) 06/12/2023       PT/INR:    Lab Results   Component Value Date    PROTIME 22.3 (H) 09/13/2019    INR 2.0 (H) 09/13/2019       Diagnostic Studies:       === 06/30/22 ===    - Impression -  Normal Lexiscan Myoview cardiac perfusion stress test.  No evidence of ischemia or myocardial infarction by perfusion imaging.  Normal left ventricular systolic function, ejection fraction 61%.  Low risk Lexiscan Myoview cardiac perfusion stress test.  Comparison study dated March 9, 2012 was negative for ischemia or  infarction. Calculated LV ejection fraction 68%.      Problem List:     Patient Active Problem List   Diagnosis    Accelerated essential hypertension    Atherosclerosis of native coronary artery of native heart without angina pectoris    Atrial fibrillation, permanent (Multi)    Atrioventricular block, second degree    Congestive heart failure, chronic, left-sided    Dilated cardiomyopathy (Multi)    H/O sick sinus syndrome    Hyperlipidemia    Palpitations    Presence of permanent cardiac pacemaker    Pulmonary embolism    Sinoatrial node dysfunction (Multi)    Sleep apnea    SOB (shortness of breath) on exertion    Third degree AV block (Multi)    Anticoagulant long-term use    High risk medication use       Asessment:     Problem List Items Addressed This Visit             ICD-10-CM    Atherosclerosis of native coronary artery of native heart without angina pectoris I25.10    Relevant Orders    Follow Up In Cardiology    Comprehensive Metabolic Panel    CBC    Lipid  Panel    Atrial fibrillation, permanent (Multi) I48.21    Relevant Orders    Follow Up In Cardiology    Comprehensive Metabolic Panel    CBC    Lipid Panel    Atrioventricular block, second degree I44.1    Relevant Orders    Follow Up In Cardiology    Comprehensive Metabolic Panel    CBC    Lipid Panel    H/O sick sinus syndrome Z86.79    Relevant Orders    Follow Up In Cardiology    Comprehensive Metabolic Panel    CBC    Lipid Panel    Hyperlipidemia E78.5    Relevant Orders    Follow Up In Cardiology    Comprehensive Metabolic Panel    CBC    Lipid Panel    Presence of permanent cardiac pacemaker Z95.0    Relevant Orders    Follow Up In Cardiology    Comprehensive Metabolic Panel    CBC    Lipid Panel    Sinoatrial node dysfunction (Multi) I49.5    Relevant Orders    Follow Up In Cardiology    Comprehensive Metabolic Panel    CBC    Lipid Panel    Anticoagulant long-term use Z79.01    Relevant Orders    Follow Up In Cardiology    Comprehensive Metabolic Panel    CBC    Lipid Panel    High risk medication use Z79.899    Relevant Orders    Follow Up In Cardiology    Comprehensive Metabolic Panel    CBC    Lipid Panel

## 2024-12-02 NOTE — PATIENT INSTRUCTIONS
6 month follow up    Fasting labs to be done approximately 1 week prior to next appointment. You no longer get lab requisitions. The order is in the computer and you can go to any  lab to have done.         DID YOU KNOW  We have a pharmacy here in the [facility].  They can fill all prescriptions, not just cardiac medications.  Prescriptions from other pharmacies can easily be transferred to the  pharmacy by the  pharmacist on site.   pharmacies offer FREE HOME DELIVERY on medications to anywhere in [place]. They can sync your medications. Typically prescriptions can be ready in 10 - 15 minutes. If pharmacy is unable to fill your  prescription or if cost is more than your paying now the Pharmacist can easily transfer back to your Pharmacy of choice. Pharmacy phone # [phone].       Please bring all medicines, vitamins, and herbal supplements with you in original bottles to every appointment!!!!    Prescriptions will not be filled unless you are compliant with your follow up appointments or have a follow up appointment scheduled as per instruction of your physician. Refills should be requested at the time of your visit.

## 2025-01-03 ENCOUNTER — ANTI-COAG VISIT (OUTPATIENT)
Age: 85
End: 2025-01-03
Payer: MEDICARE

## 2025-01-03 DIAGNOSIS — T81.718A IATROGENIC PULMONARY EMBOLISM, INITIAL ENCOUNTER (HCC): Primary | ICD-10-CM

## 2025-01-03 DIAGNOSIS — I26.99 IATROGENIC PULMONARY EMBOLISM, INITIAL ENCOUNTER (HCC): Primary | ICD-10-CM

## 2025-01-03 LAB
INTERNATIONAL NORMALIZATION RATIO, POC: 3.3
PROTHROMBIN TIME, POC: 0

## 2025-01-03 PROCEDURE — 85610 PROTHROMBIN TIME: CPT

## 2025-01-03 PROCEDURE — 99212 OFFICE O/P EST SF 10 MIN: CPT

## 2025-01-03 NOTE — PROGRESS NOTES
Mr. Ian Bates is a 84 y.o. y/o male with history of PE who presents today for anticoagulation monitoring and adjustment.  INR 3.3 is supratherapeutic for this patient (goal range 2.0-3.0) and is reflective of 32.5 mg TWD  Patient verifies current dosing regimen, patient able to verbally recall dose  Patient reports 0  missed doses since last INR   Patient denies s/sx clotting and/or stroke  Patient denies hematuria, epistaxis, rectal bleeding  Patient denies changes in diet, alcohol, or tobacco use  Reviewed medication list and drug allergies with patient, updated any medication additions or modifications accordingly  Patient also denies any pending medical or dental procedures scheduled at this time  Patient mildly supratherapeutic at 3.3, previous visit was therapeutic. Patient already took warfarin today, so was instructed to hold dose tomorrow and then take a half dose of 2.5mg on . Patient then to resume current home regimen of 2.5mg Saturday, 5mg daily. RTC 6 weeks    For Pharmacy Admin Tracking Only    Intervention Detail: Adherence Monitorin, Dose Adjustment: 1, reason: Therapy Optimization, and Lab(s) Ordered  Total # of Interventions Recommended: 2  Total # of Interventions Accepted: 2  Time Spent (min): 15    Ashley CaoD, BCPS  1/3/2025 8:00 AM

## 2025-01-07 ENCOUNTER — HOSPITAL ENCOUNTER (OUTPATIENT)
Dept: CARDIOLOGY | Age: 85
Discharge: HOME | End: 2025-01-07
Payer: MEDICARE

## 2025-01-07 PROCEDURE — 93296 REM INTERROG EVL PM/IDS: CPT

## 2025-02-11 ENCOUNTER — TELEPHONE (OUTPATIENT)
Dept: CARDIOLOGY | Facility: CLINIC | Age: 85
End: 2025-02-11
Payer: MEDICARE

## 2025-02-11 NOTE — TELEPHONE ENCOUNTER
Rec'd fax from [facility], General Surgery, Dr. Rowan. Pt to be scheduled for open right inguinal hernia repair under general. Will need to withhold Coumadin 5 days and Asa 7 days prior.     Form placed in Dr. Bragg's basket.   Last seen 12/2/24

## 2025-02-13 ENCOUNTER — PREP FOR PROCEDURE (OUTPATIENT)
Age: 85
End: 2025-02-13

## 2025-02-13 ENCOUNTER — ANTI-COAG VISIT (OUTPATIENT)
Age: 85
End: 2025-02-13

## 2025-02-13 DIAGNOSIS — I26.99 IATROGENIC PULMONARY EMBOLISM, SUBSEQUENT ENCOUNTER (HCC): Primary | ICD-10-CM

## 2025-02-13 DIAGNOSIS — T81.718D IATROGENIC PULMONARY EMBOLISM, SUBSEQUENT ENCOUNTER (HCC): Primary | ICD-10-CM

## 2025-02-13 NOTE — TELEPHONE ENCOUNTER
Faxed and received confirmation.   Contacted [facility] advised of  bridging instructions, verbalized understanding. Faxed telephone encounter with confirmation. Charity MAI

## 2025-02-13 NOTE — PROGRESS NOTES
Okay to Hold warfarin 5 days prior to scheduled surgery   Lovenox 100 mg BID prior to surgery 3 days prior. Hold 24 hr prior to surgery  Resume warfarin day of surgery  Resume Lovenox 100 mg BID once bleeding risk is deemed to be minimal by his surgeon.    PT/INR post day 3 then daily until INR > 1.9  Continue Lovenox until INR>1.9 then discontinue.    Per Dr. Apolinar MD

## 2025-02-14 ENCOUNTER — ANTI-COAG VISIT (OUTPATIENT)
Age: 85
End: 2025-02-14
Payer: MEDICARE

## 2025-02-14 DIAGNOSIS — T81.718D IATROGENIC PULMONARY EMBOLISM, SUBSEQUENT ENCOUNTER (HCC): Primary | ICD-10-CM

## 2025-02-14 DIAGNOSIS — I26.99 IATROGENIC PULMONARY EMBOLISM, SUBSEQUENT ENCOUNTER (HCC): Primary | ICD-10-CM

## 2025-02-14 LAB
INTERNATIONAL NORMALIZATION RATIO, POC: 2.6
PROTHROMBIN TIME, POC: 0

## 2025-02-14 PROCEDURE — 99211 OFF/OP EST MAY X REQ PHY/QHP: CPT | Performed by: PHARMACIST

## 2025-02-14 PROCEDURE — 85610 PROTHROMBIN TIME: CPT | Performed by: PHARMACIST

## 2025-02-14 NOTE — PROGRESS NOTES
Mr. Ian Bates is a 85 y.o. y/o male with history of PE who presents today for anticoagulation monitoring and adjustment.    INR 2.6 is therapeutic for this patient (goal range 2-3) and is reflective of 32.5 mg TWD  Patient verifies current dosing regimen, patient able to verbally recall dose  Patient reports possible missed doses once last Tuesday since last INR     Patient denies s/sx clotting and/or stroke  Patient denies hematuria, epistaxis, rectal bleeding  Patient denies changes in diet, alcohol, or tobacco use  Reviewed medication list; reports starting Nail exodus daily for the last 3 weeks  Anticipate no drug interactions after reviewing ingredients.    Patient has  a plan for hernia repair with Dr. Gómez at [facility] in the next month (no date set as of yet).  Received warfarin/aspirin hold and Lovenox bridging instructions from Dr. Jiang's office.  (Hold warfarin 5 days prior and aspirin 7 days prior.  Start Lovenox 100mg bid 3 days prior to surgery- 6 doses.  Resume Lovenox post procedure under direction of surgeon. INR to be checked post-op day 3 and then daily until INR is greater than 1.9. Lovenox can then be discontinued)  Patient to contact Lindsey Villareal when date is finalized.    Patient was instructed to maintain current regimen and RTC 6 weeks (may be rescheduled depending on anticipated scheduling of his hernia surgery)    For Pharmacy Admin Tracking Only    Intervention Detail: Adherence Monitorin  Total # of Interventions Recommended: 1  Total # of Interventions Accepted: 1  Time Spent (min): 20  Mariaelena Hay [facility]  2025  8:30 AM

## 2025-02-19 ENCOUNTER — OFFICE VISIT (OUTPATIENT)
Dept: PULMONOLOGY | Age: 85
End: 2025-02-19
Payer: MEDICARE

## 2025-02-19 VITALS
DIASTOLIC BLOOD PRESSURE: 80 MMHG | TEMPERATURE: 97.6 F | SYSTOLIC BLOOD PRESSURE: 138 MMHG | WEIGHT: 250 LBS | HEART RATE: 86 BPM | HEIGHT: 75 IN | OXYGEN SATURATION: 96 % | BODY MASS INDEX: 31.08 KG/M2

## 2025-02-19 DIAGNOSIS — G47.33 OSA ON CPAP: Primary | ICD-10-CM

## 2025-02-19 DIAGNOSIS — G47.34 SLEEP RELATED HYPOXIA: ICD-10-CM

## 2025-02-19 PROCEDURE — 1036F TOBACCO NON-USER: CPT | Performed by: INTERNAL MEDICINE

## 2025-02-19 PROCEDURE — 1123F ACP DISCUSS/DSCN MKR DOCD: CPT | Performed by: INTERNAL MEDICINE

## 2025-02-19 PROCEDURE — G8417 CALC BMI ABV UP PARAM F/U: HCPCS | Performed by: INTERNAL MEDICINE

## 2025-02-19 PROCEDURE — 99213 OFFICE O/P EST LOW 20 MIN: CPT | Performed by: INTERNAL MEDICINE

## 2025-02-19 PROCEDURE — G8428 CUR MEDS NOT DOCUMENT: HCPCS | Performed by: INTERNAL MEDICINE

## 2025-02-19 NOTE — PROGRESS NOTES
PATIENT VISIT-PULMONARY/SLEEP    2/19/2025       May  REFERRING PHYSICIAN:  Carina Kelly MD     REASON FOR REFERRAL:  CARO    HPI:     Ian Bates is a 85 y.o. male who was referred to sleep and pulmonary clinic for evaluation.     He is known to have history of coronary artery disease, A. fib, hypertension and pulmonary emboli.  He recently had a sleep study which I reviewed personally and interpreted the results independently.  He had a mild case of sleep apnea with an overall AHI of 12.  There was moderate nocturnal desaturations as well.  He had a machine for the last 5 years.  However, his machine is on recall.  He has not been using it for few months.  He has been waiting for [facility] to get him a new machine but this has been taken longer than he expected.  He is only using oxygen for now.  He started and sleepy during the day and he dozes off easily.  Weight has been stable overall.  Has some mild shortness of breath with activities.      2/13/23:    He comes for follow up. He was prescribed AutoPAP, min pressure of 5, max pressure 10 CM H2O. compliance has been good.  He uses the machine regularly every night.  He is averaging about 6 hours.  He feels more refreshed.  He feels that he has more energy.  His sleep is resting.  Denies any issues with the mask of the machine or the pressure.    2/15/24:    Has been doing well overall.  Using the machine regularly.  Averaging about 5 to 6 hours every night.  Tolerating the machine pretty well.  No issues with the mask.  Getting supplies regularly.  Weight has been overall about the same.  Continues to be using oxygen with the CPAP.        2/19/25:      Comes back for follow-up.  Has been doing relatively well.  Continues to be compliant with CPAP.  Averaging about 7 hours and 40 minutes.  AHI is normal on the machine.  Denies any issues with the mask.  Feels refreshed during the day.  Continues to be on oxygen with

## 2025-02-20 ENCOUNTER — TELEPHONE (OUTPATIENT)
Age: 85
End: 2025-02-20

## 2025-02-20 NOTE — TELEPHONE ENCOUNTER
Pt called to update us- Procedure will be 3-20-25  Pt was instructed to HOLD warfarin for 5 evenings prior to scheduled surgery. Pt to start Lovenox 100 mg BID starting 3 days prior to surgery. (6 doses of Lovenox total).     HOLD Lovenox starting 24 hours before surgery. Resume Warfarin the evening of surgery and continue daily. Resume Lovenox once bleeding risk is minimal by surgeon.   Continue Lovenox until INR is greater that 1.9.  DAILY INR's until INR is greater than 1.9    Pt has appt in Coumadin clinic the following Monday 3-24-25 in the AM

## 2025-03-12 RX ORDER — ENOXAPARIN SODIUM 100 MG/ML
1 INJECTION SUBCUTANEOUS 2 TIMES DAILY
Qty: 14 EACH | Refills: 0 | OUTPATIENT
Start: 2025-03-12

## 2025-03-24 ENCOUNTER — ANTI-COAG VISIT (OUTPATIENT)
Age: 85
End: 2025-03-24
Payer: MEDICARE

## 2025-03-24 DIAGNOSIS — I26.99 IATROGENIC PULMONARY EMBOLISM, SUBSEQUENT ENCOUNTER (HCC): Primary | ICD-10-CM

## 2025-03-24 DIAGNOSIS — T81.718D IATROGENIC PULMONARY EMBOLISM, SUBSEQUENT ENCOUNTER (HCC): Primary | ICD-10-CM

## 2025-03-24 LAB
INTERNATIONAL NORMALIZATION RATIO, POC: 1
PROTHROMBIN TIME, POC: 0

## 2025-03-24 PROCEDURE — 99213 OFFICE O/P EST LOW 20 MIN: CPT

## 2025-03-24 PROCEDURE — 85610 PROTHROMBIN TIME: CPT

## 2025-03-24 RX ORDER — ENOXAPARIN SODIUM 100 MG/ML
1 INJECTION SUBCUTANEOUS 2 TIMES DAILY
Qty: 6 EACH | Refills: 0 | Status: SHIPPED | OUTPATIENT
Start: 2025-03-24

## 2025-03-24 NOTE — PROGRESS NOTES
Mr. Ian Batse is a 85 y.o. y/o male with history of PE who presents today for anticoagulation monitoring and adjustment.  INR 1.0 is sub-therapeutic for this patient (goal range 2.0-3.0) and is reflective of 5 mg TWD due to holding warfarin for procedure  Patient verifies current dosing regimen, patient able to verbally recall dose  Patient held warfarin starting 03/15 for procedure. Patient was supposed to resume warfarin the evening of the surgery on  but misunderstood instructions and took first warfarin dose on .   Patient denies s/sx clotting and/or stroke  Patient denies hematuria, epistaxis, rectal bleeding - Patient reports some drainage from incision but it is not bloody. Noticed some purple bruising around testicle area that may be due to restrictive clothing per patient, and patient states he is following up with Dr. Gómez's office to address   Patient denies changes in diet, alcohol, or tobacco use  Reviewed medication list and drug allergies with patient, updated any medication additions or modifications accordingly - Patient has been taking Lovenox 100 mg BID as instructed. He has 3 more injections left. Reports minimal bruising around injection sites  Per surgeon's instructions, patient to continue Lovenox until INR is greater that 1.9, and to have daily INR's until INR is greater than 1.9.   Phoned in prescription for 6 more syringes  Patient was instructed to boost warfarin 10 mg today, continue the Lovenox injections and RTC tomorrow     Zuleika Bella, PharmD   For Pharmacy Admin Tracking Only    Intervention Detail: Adherence Monitorin, Dose Adjustment: 1, reason: Therapy Optimization, and Refill(s) Provided  Total # of Interventions Recommended: 3  Total # of Interventions Accepted: 3  Time Spent (min): 20

## 2025-03-24 NOTE — TELEPHONE ENCOUNTER
Patient seen in clinic today for INR recheck and will be continuing Lovenox until INR > 1.9. Patient has 3 syringes left. [email or web address]    Lovenox (enoxaparin) 100 mg/mL syringe  Quantity # 6 syringes (3 day supply)  Instructions: Inject 1 ml into the skin twice daily

## 2025-03-25 ENCOUNTER — ANTI-COAG VISIT (OUTPATIENT)
Age: 85
End: 2025-03-25
Payer: MEDICARE

## 2025-03-25 DIAGNOSIS — T81.718D IATROGENIC PULMONARY EMBOLISM, SUBSEQUENT ENCOUNTER (HCC): Primary | ICD-10-CM

## 2025-03-25 DIAGNOSIS — I26.99 IATROGENIC PULMONARY EMBOLISM, SUBSEQUENT ENCOUNTER (HCC): Primary | ICD-10-CM

## 2025-03-25 LAB
INTERNATIONAL NORMALIZATION RATIO, POC: 1.1
PROTHROMBIN TIME, POC: 0

## 2025-03-25 PROCEDURE — 99213 OFFICE O/P EST LOW 20 MIN: CPT | Performed by: PHARMACIST

## 2025-03-25 PROCEDURE — 85610 PROTHROMBIN TIME: CPT | Performed by: PHARMACIST

## 2025-03-25 NOTE — PROGRESS NOTES
Mr. Ian Bates is a 85 y.o. y/o male with history of PE who presents today for anticoagulation monitoring and adjustment.  INR 1.1 is SUB-therapeutic for this patient (goal range 2-3) and is reflective of 5 mg TWD  Patient verifies current dosing regimen, patient able to verbally recall dose  Patient held warfarin starting 03/15 for procedure. Patient was supposed to resume warfarin the evening of the surgery on  but misunderstood instructions and took first warfarin dose on .   Patient denies s/sx clotting and/or stroke  Patient denies hematuria, epistaxis, rectal bleeding  Patient denies changes in diet, alcohol, or tobacco use  Reviewed medication list and drug allergies with patient, updated any medication additions or modifications accordingly  Patient also denies any pending medical or dental procedures scheduled at this time  Patient has been taking Lovenox 100 mg BID as instructed.     Per surgeon's instructions, patient to continue Lovenox until INR is greater that 1.9, and to have daily INR's until INR is greater than 1.9.     Patient was instructed to boost warfarin 10 mg today, continue the Lovenox injections and RTC tomorrow     For Pharmacy Admin Tracking Only    Intervention Detail: Adherence Monitorin  Total # of Interventions Recommended: 1  Total # of Interventions Accepted: 1  Time Spent (min): 15

## 2025-03-26 ENCOUNTER — ANTI-COAG VISIT (OUTPATIENT)
Age: 85
End: 2025-03-26
Payer: MEDICARE

## 2025-03-26 DIAGNOSIS — I26.99 IATROGENIC PULMONARY EMBOLISM, SUBSEQUENT ENCOUNTER (HCC): Primary | ICD-10-CM

## 2025-03-26 DIAGNOSIS — T81.718D IATROGENIC PULMONARY EMBOLISM, SUBSEQUENT ENCOUNTER (HCC): Primary | ICD-10-CM

## 2025-03-26 LAB
INTERNATIONAL NORMALIZATION RATIO, POC: 1.4
PROTHROMBIN TIME, POC: 0

## 2025-03-26 PROCEDURE — 99212 OFFICE O/P EST SF 10 MIN: CPT

## 2025-03-26 PROCEDURE — 85610 PROTHROMBIN TIME: CPT

## 2025-03-26 NOTE — PROGRESS NOTES
Mr. Ian Bates is a 85 y.o. y/o male with history of PE who presents today for anticoagulation monitoring and adjustment.  INR 1.4 is sub-therapeutic for this patient (goal range 2.0-3.0) and is reflective of 25 mg TWD. Patient held warfarin starting 03/15 for procedure. Patient was supposed to resume warfarin the evening of the surgery on  but misunderstood instructions and took first warfarin dose on .   Patient verifies current dosing regimen, patient able to verbally recall dose  Patient reports 0 missed doses since last INR   Patient denies s/sx clotting and/or stroke  Patient denies hematuria, epistaxis, rectal bleeding - Patient reports minimal bruising around the injection sites for Lovenox. Patient states he has been rubbing the area after injection. Advised patient against rubbing the injection site as this can increase bruising   Patient denies changes in diet, alcohol, or tobacco use  Reviewed medication list and drug allergies with patient, updated any medication additions or modifications accordingly. Patient has been taking Lovenox 100 mg BID as instructed.   Per surgeon's instructions, patient to continue Lovenox until INR is greater that 1.9, and to have daily INR's until INR is greater than 1.9.   Patient also denies any pending medical or dental procedures scheduled at this time    Patient was instructed to boost warfarin 7.5 mg today and continue the Lovenox injections and RTC tomorrow    Zuleika Bella, PharmD   For Pharmacy Admin Tracking Only    Intervention Detail: Adherence Monitorin and Dose Adjustment: 1, reason: Therapy Optimization  Total # of Interventions Recommended: 2  Total # of Interventions Accepted: 2  Time Spent (min): 15

## 2025-03-27 ENCOUNTER — ANTI-COAG VISIT (OUTPATIENT)
Age: 85
End: 2025-03-27
Payer: MEDICARE

## 2025-03-27 DIAGNOSIS — T81.718D IATROGENIC PULMONARY EMBOLISM, SUBSEQUENT ENCOUNTER (HCC): Primary | ICD-10-CM

## 2025-03-27 DIAGNOSIS — I26.99 IATROGENIC PULMONARY EMBOLISM, SUBSEQUENT ENCOUNTER (HCC): Primary | ICD-10-CM

## 2025-03-27 LAB
INTERNATIONAL NORMALIZATION RATIO, POC: 2
PROTHROMBIN TIME, POC: 0

## 2025-03-27 PROCEDURE — 85610 PROTHROMBIN TIME: CPT | Performed by: PHARMACIST

## 2025-03-27 PROCEDURE — 99211 OFF/OP EST MAY X REQ PHY/QHP: CPT | Performed by: PHARMACIST

## 2025-03-27 NOTE — PROGRESS NOTES
Mr. Ian Bates is a 85 y.o. y/o male with history of PE who presents today for anticoagulation monitoring and adjustment.  INR 2.0 is therapeutic for this patient (goal range 2-3) and is reflective of 32.5 mg TWD  Patient verifies current dosing regimen, patient able to verbally recall dose  Patient reports no  missed doses since last INR   Patient has been taking Lovenox 100 mg BID as instructed since procedure 3/20/25.  Per surgeon's instructions, patient to continue Lovenox until INR is greater that 1.9, and to have daily INR's until INR is greater than 1.9.   Patient denies s/sx clotting and/or stroke  Patient denies hematuria, epistaxis, rectal bleeding  Patient denies changes in diet, alcohol, or tobacco use  Reviewed medication list and drug allergies with patient, updated any medication additions or modifications accordingly  Patient also denies any pending medical or dental procedures scheduled at this time  Patient was instructed to stop Lovenox and follow prior clinic dose of 32.5mg TWD  .    RTC 2 weeks    For Pharmacy Admin Tracking Only    Intervention Detail: Adherence Monitorin  Total # of Interventions Recommended: 1  Total # of Interventions Accepted: 1  Time Spent (min): 30

## 2025-04-11 ENCOUNTER — ANTI-COAG VISIT (OUTPATIENT)
Age: 85
End: 2025-04-11
Payer: MEDICARE

## 2025-04-11 DIAGNOSIS — T81.718D IATROGENIC PULMONARY EMBOLISM, SUBSEQUENT ENCOUNTER (HCC): Primary | ICD-10-CM

## 2025-04-11 DIAGNOSIS — I26.99 IATROGENIC PULMONARY EMBOLISM, SUBSEQUENT ENCOUNTER (HCC): Primary | ICD-10-CM

## 2025-04-11 LAB
INTERNATIONAL NORMALIZATION RATIO, POC: 3.3
PROTHROMBIN TIME, POC: 0

## 2025-04-11 PROCEDURE — 85610 PROTHROMBIN TIME: CPT

## 2025-04-11 PROCEDURE — 99212 OFFICE O/P EST SF 10 MIN: CPT

## 2025-04-11 NOTE — PROGRESS NOTES
Mr. Ian Bates is a 85 y.o. y/o male with history of PE who presents today for anticoagulation monitoring and adjustment.  INR 3.3 is supra-therapeutic for this patient (goal range 2-3) and is reflective of 32.5 mg TWD  Patient verifies current dosing regimen, patient able to verbally recall dose  Patient reports 0  missed doses since last INR   Patient denies s/sx clotting and/or stroke  Patient denies hematuria, epistaxis, rectal bleeding  Patient denies changes in diet, alcohol, or tobacco use  Reviewed medication list and drug allergies with patient, updated any medication additions or modifications accordingly  Patient also denies any pending medical or dental procedures scheduled at this time  Patient was instructed to take half dose today (2.5 mg), then resume 32.5 mg TWD and RTC 2 weeks. Pt previously 6 weeks prior to procedure      For Pharmacy Admin Tracking Only    Intervention Detail: Dose Adjustment: 1, reason: Therapy De-escalation  Total # of Interventions Recommended: 1  Total # of Interventions Accepted: 1  Time Spent (min): 15

## 2025-04-15 ENCOUNTER — HOSPITAL ENCOUNTER (OUTPATIENT)
Dept: CARDIOLOGY | Age: 85
Discharge: HOME | End: 2025-04-15
Payer: MEDICARE

## 2025-04-15 PROCEDURE — 93279 PRGRMG DEV EVAL PM/LDLS PM: CPT | Performed by: INTERNAL MEDICINE

## 2025-04-15 PROCEDURE — 93279 PRGRMG DEV EVAL PM/LDLS PM: CPT

## 2025-04-25 ENCOUNTER — ANTI-COAG VISIT (OUTPATIENT)
Age: 85
End: 2025-04-25
Payer: MEDICARE

## 2025-04-25 DIAGNOSIS — I26.99 IATROGENIC PULMONARY EMBOLISM, SUBSEQUENT ENCOUNTER (HCC): Primary | ICD-10-CM

## 2025-04-25 DIAGNOSIS — T81.718D IATROGENIC PULMONARY EMBOLISM, SUBSEQUENT ENCOUNTER (HCC): Primary | ICD-10-CM

## 2025-04-25 LAB
INTERNATIONAL NORMALIZATION RATIO, POC: 2.6 (ref 2–3)
PROTHROMBIN TIME, POC: 0

## 2025-04-25 PROCEDURE — 99211 OFF/OP EST MAY X REQ PHY/QHP: CPT | Performed by: PHARMACIST

## 2025-04-25 PROCEDURE — 85610 PROTHROMBIN TIME: CPT | Performed by: PHARMACIST

## 2025-04-25 NOTE — PROGRESS NOTES
Mr. Ian Bates is a 85 y.o. y/o male with history of PE who presents today for anticoagulation monitoring and adjustment.  INR 2.6 is therapeutic for this patient (goal range 2-3) and is reflective of 32.5 mg TWD  Patient verifies current dosing regimen, patient able to verbally recall dose  Patient reports NO missed doses since last INR   Patient denies s/sx clotting and/or stroke  Patient denies hematuria, epistaxis, rectal bleeding  Patient denies changes in diet, alcohol, or tobacco use  Reviewed medication list and drug allergies with patient, updated any medication additions or modifications accordingly  Patient also denies any pending medical or dental procedures scheduled at this time  Patient states surgical site has stopped draining  Patient was instructed to continue 32.5 mg TWD and RTC 6 weeks (patient request).    Faraz Ortega R.Ph.  2025  7:52 AM    For Pharmacy Admin Tracking Only    Intervention Detail: Adherence Monitorin  Total # of Interventions Recommended: 0  Total # of Interventions Accepted: 0  Time Spent (min): 15

## 2025-05-15 ENCOUNTER — APPOINTMENT (OUTPATIENT)
Dept: CARDIOLOGY | Facility: CLINIC | Age: 85
End: 2025-05-15
Payer: MEDICARE

## 2025-05-15 VITALS
BODY MASS INDEX: 31.47 KG/M2 | SYSTOLIC BLOOD PRESSURE: 116 MMHG | HEART RATE: 60 BPM | DIASTOLIC BLOOD PRESSURE: 66 MMHG | HEIGHT: 74 IN | WEIGHT: 245.2 LBS

## 2025-05-15 DIAGNOSIS — Z95.0 PRESENCE OF PERMANENT CARDIAC PACEMAKER: ICD-10-CM

## 2025-05-15 DIAGNOSIS — I48.21 ATRIAL FIBRILLATION, PERMANENT (MULTI): ICD-10-CM

## 2025-05-15 DIAGNOSIS — Z79.899 HIGH RISK MEDICATION USE: ICD-10-CM

## 2025-05-15 DIAGNOSIS — Z86.79 H/O SICK SINUS SYNDROME: ICD-10-CM

## 2025-05-15 DIAGNOSIS — I44.1 ATRIOVENTRICULAR BLOCK, SECOND DEGREE: ICD-10-CM

## 2025-05-15 DIAGNOSIS — Z78.9 NEVER SMOKED TOBACCO: ICD-10-CM

## 2025-05-15 DIAGNOSIS — R06.02 SHORTNESS OF BREATH: ICD-10-CM

## 2025-05-15 DIAGNOSIS — I49.5 SINOATRIAL NODE DYSFUNCTION (MULTI): ICD-10-CM

## 2025-05-15 DIAGNOSIS — Z79.01 ANTICOAGULANT LONG-TERM USE: ICD-10-CM

## 2025-05-15 DIAGNOSIS — I50.22 CHRONIC SYSTOLIC (CONGESTIVE) HEART FAILURE: ICD-10-CM

## 2025-05-15 DIAGNOSIS — I50.1 CONGESTIVE HEART FAILURE, CHRONIC, LEFT-SIDED: ICD-10-CM

## 2025-05-15 DIAGNOSIS — I25.10 ATHEROSCLEROSIS OF NATIVE CORONARY ARTERY OF NATIVE HEART WITHOUT ANGINA PECTORIS: Primary | ICD-10-CM

## 2025-05-15 DIAGNOSIS — E78.2 MIXED HYPERLIPIDEMIA: ICD-10-CM

## 2025-05-15 DIAGNOSIS — J44.1 COPD EXACERBATION (MULTI): ICD-10-CM

## 2025-05-15 PROCEDURE — 3078F DIAST BP <80 MM HG: CPT | Performed by: INTERNAL MEDICINE

## 2025-05-15 PROCEDURE — 99215 OFFICE O/P EST HI 40 MIN: CPT | Performed by: INTERNAL MEDICINE

## 2025-05-15 PROCEDURE — 1036F TOBACCO NON-USER: CPT | Performed by: INTERNAL MEDICINE

## 2025-05-15 PROCEDURE — 1159F MED LIST DOCD IN RCRD: CPT | Performed by: INTERNAL MEDICINE

## 2025-05-15 PROCEDURE — 3074F SYST BP LT 130 MM HG: CPT | Performed by: INTERNAL MEDICINE

## 2025-05-15 RX ORDER — DILTIAZEM HYDROCHLORIDE 180 MG/1
180 CAPSULE, EXTENDED RELEASE ORAL DAILY
Qty: 90 CAPSULE | Refills: 3 | Status: SHIPPED | OUTPATIENT
Start: 2025-05-15

## 2025-05-15 RX ORDER — PROPRANOLOL HYDROCHLORIDE 80 MG/1
80 TABLET ORAL EVERY 12 HOURS
Qty: 180 TABLET | Refills: 3 | Status: SHIPPED | OUTPATIENT
Start: 2025-05-15

## 2025-05-15 RX ORDER — TORSEMIDE 20 MG/1
10 TABLET ORAL
COMMUNITY
Start: 2025-03-13

## 2025-05-15 NOTE — PATIENT INSTRUCTIONS
Follow up 3 months    Keep appointment 6/2/25 with Dr. Bragg    Echocardiogram (ultrasound of heart) to be done near future    Lexiscan (chemical stress test) to be done near future      DID YOU KNOW  We have a pharmacy here in the [facility].  They can fill all prescriptions, not just cardiac medications.  Prescriptions from other pharmacies can easily be transferred to the  pharmacy by the  pharmacist on site.   pharmacies offer FREE HOME DELIVERY on medications to anywhere in [place]. They can sync your medications. Typically prescriptions can be ready in 10 - 15 minutes. If pharmacy is unable to fill your  prescription or if cost is more than your paying now the Pharmacist can easily transfer back to your Pharmacy of choice. Pharmacy phone # [phone].     Please bring all medicines, vitamins, and herbal supplements with you in original bottles to every appointment!!!!    Prescriptions will not be filled unless you are compliant with your follow up appointments or have a follow up appointment scheduled as per instruction of your physician. Refills should be requested at the time of your visit.

## 2025-05-15 NOTE — PROGRESS NOTES
CARDIOLOGY OFFICE VISIT      CHIEF COMPLAINT  Chief Complaint   Patient presents with    Follow-up    Hyperlipidemia    Atrial Fibrillation       HISTORY OF PRESENT ILLNESS  HPI  85-year-old male with a past medical history of coronary artery disease with mild to moderate disease per cardiac catheterization in 2015 and normal left ventricular function per echocardiogram in 2015. He has a history of sinus node dysfunction for which he underwent plantation of a dual-chamber pacemaker (Medtronic device) in December 2011 and also generator change out in September 2018 with no complications. He is on chronic anticoagulant therapy with warfarin.     Patient had a device interrogation October 2021 that shows battery longevity 10 years. Patient was 100% atrial fibrillation. Rates controlled during atrial fibrillation. Looking at his records, he was admitted in May 2022 at [facility] complaining of shortness of breath and edema in the lower extremities. He had an echocardiogram during this admission that shows left ventricular ejection fraction 45 to 50%. He was diuresed. Stress test in June 2022 shows no evidence of ischemia or myocardial infarction with a left ventricular ejection fraction of 61% Patient had to stop metoprolol so now he is using propranolol for significant tremors with significant progression of the symptoms.    Patient said lately he has been noticing episodes of shortness of breath with moderate activities.  No chest pain.  No palpitations.    Patient had a device interrogation in April 2025.  Patient has a dual-chamber pacemaker Medtronic with battery longevity 3.6 years.  Patient is 100% in atrial fibrillation.  99% RV paced.            Past Medical History  Medical History[1]    Social History  Social History[2]    Family History   Family History[3]     Allergies:  RX Allergies[4]     Outpatient Medications:  Current Outpatient Medications   Medication Instructions    acetaminophen (Tylenol  Extra Strength) 500 mg tablet 1 tablet, Every 4 hours PRN    ascorbic acid (Vitamin C) 1,000 mg tablet TAKE 1 TABLET TWO TO THREE TIMES WEEKLY    aspirin 81 mg EC tablet 1 tablet, Daily    atorvastatin (LIPITOR) 10 mg, oral, Daily    cholecalciferol (Vitamin D-3) 5,000 Units tablet 1 tablet    dilTIAZem ER (TIAZAC) 180 mg, oral, Daily    hydrALAZINE (Apresoline) 100 mg tablet 1 TABLET TWICE A DAY    magnesium oxide 400 mg magnesium capsule 1 capsule, Daily    multivitamin (Daily Multi-Vitamin) tablet 1 tablet, Daily    nitroglycerin (Nitrostat) 0.4 mg SL tablet Place under the tongue.    propranolol (INDERAL) 80 mg, oral, Every 12 hours    tamsulosin (Flomax) 0.4 mg 24 hr capsule 1 tablet, Daily    torsemide (DEMADEX) 10 mg, oral, Daily (0630)    trospium (Sanctura) 20 mg tablet 1 tablet, 2 times daily    valsartan (DIOVAN) 320 mg, oral, Daily    vitamin E acid succinate (vitamin E succinate) 268 mg (400 unit) tablet 1 tablet, Daily    warfarin (Jantoven) 5 mg tablet Daily          REVIEW OF SYSTEMS  Review of Systems   All other systems reviewed and are negative.        VITALS  Vitals:    05/15/25 0904   BP: 116/66   Pulse: 60       PHYSICAL EXAM  Constitutional:       Appearance: Healthy appearance. Not in distress.   Neck:      Vascular: No JVR. JVD normal.   Pulmonary:      Effort: Pulmonary effort is normal.      Breath sounds: Normal breath sounds. No wheezing. No rhonchi. No rales.   Chest:      Chest wall: Not tender to palpatation.   Cardiovascular:      PMI at left midclavicular line. Normal rate. Regular rhythm. Normal S1. Normal S2.       Murmurs: There is no murmur.      No gallop.  No click. No rub.      Comments: Device left pectoral area. No hematoma or infection noted.     Pulses:     Intact distal pulses.   Edema:     Peripheral edema absent.   Abdominal:      General: Bowel sounds are normal.      Palpations: Abdomen is soft.      Tenderness: There is no abdominal tenderness.   Musculoskeletal:  Normal range of motion.         General: No tenderness. Skin:     General: Skin is warm and dry.   Neurological:      General: No focal deficit present.      Mental Status: Alert and oriented to person, place and time.           ASSESSMENT AND PLAN    Clinical impression     1. Longstandingâ€“permanent atrial fibrillation, on rate control strategy  2. High risk medication  3. Sinus node dysfunction, status post dual-chamber pacemaker, resolved  4. Status post dual-chamber pacemaker. Device interrogation reviewed with patient during this office visit  5. Long-term anticoagulant therapy with warfarin. No evidence of bleeding  6. Hypertension, controlled  7. Hyperlipidemia, controlled  8. Coronary artery disease with percutaneous coronary interventions in the past, stable  9. Cardiomyopathy with a low ventricular ejection fraction 45 to 50% by echocardiogram in May 2022.  10. Congestive heart failure new Heart Association class II, currently compensated  11.  Shortness of breath, multifactorial    Plan recommendations    Patient has been noticing shortness of breath for the last 2 to 3 months.  Will obtain an echocardiogram for left ventricular ejection fraction evaluation.    Get Lexiscan stress test prior to office visit with cardiology service.    Patient will be seen very closely my office in the next 3 months.  If he still complaining of shortness of breath, we can offer him upgrading his device to biventricular paced system.    Follow device clinic as scheduled.    Continue with anticoagulant therapy with warfarin.    Rate control strategy for atrial fibrillation.    Risk factor modification and lifestyle modification discussed with patient. Diet , exercise and hydration discussed with patient.    I have personally review with patient during this office visit, laboratory data, echocardiogram results, stress test results, Holter-event monitor results prior and after the last electrophysiology visit. All questions  has been answered.    Please excuse any errors in grammar or translation related to this dictation.  Voice recognition software was utilized to prepare this document.  ''    I, Dr. Potter, personally performed the services described in the documentation as scribed by the nurse in my presence, and confirm it is both accurate and complete.       Scribe Attestation  By signing my name below, IPiedad LPN , Scribe   attest that this documentation has been prepared under the direction and in the presence of Henrique Potter MD           [1] History reviewed. No pertinent past medical history.  [2]   Social History  Tobacco Use    Smoking status: Never    Smokeless tobacco: Never   Substance Use Topics    Alcohol use: Yes    Drug use: Never   [3] No family history on file.  [4]   Allergies  Allergen Reactions    Adhesive Tape-Silicones Unknown

## 2025-05-21 ENCOUNTER — HOSPITAL ENCOUNTER (OUTPATIENT)
Dept: RADIOLOGY | Facility: CLINIC | Age: 85
Discharge: HOME | End: 2025-05-21
Payer: MEDICARE

## 2025-05-21 ENCOUNTER — HOSPITAL ENCOUNTER (OUTPATIENT)
Dept: CARDIOLOGY | Facility: CLINIC | Age: 85
Discharge: HOME | End: 2025-05-21
Payer: MEDICARE

## 2025-05-21 DIAGNOSIS — R06.02 SHORTNESS OF BREATH: ICD-10-CM

## 2025-05-21 DIAGNOSIS — R06.02 SOB (SHORTNESS OF BREATH) ON EXERTION: Primary | ICD-10-CM

## 2025-05-21 LAB
ALBUMIN SERPL-MCNC: 4.2 G/DL (ref 3.6–5.1)
ALBUMIN/GLOB SERPL: 1.7 (CALC) (ref 1–2.5)
ALP SERPL-CCNC: 44 U/L (ref 35–144)
ALT SERPL-CCNC: 14 U/L (ref 9–46)
AORTIC VALVE MEAN GRADIENT: 4 MMHG
AORTIC VALVE PEAK VELOCITY: 1.24 M/S
AST SERPL-CCNC: 17 U/L (ref 10–35)
AV PEAK GRADIENT: 6 MMHG
AVA (PEAK VEL): 4.19 CM2
AVA (VTI): 4.08 CM2
BASOPHILS # BLD AUTO: 69 CELLS/UL (ref 0–200)
BASOPHILS NFR BLD AUTO: 1.4 %
BILIRUB SERPL-MCNC: 0.8 MG/DL (ref 0.2–1.2)
BUN SERPL-MCNC: 16 MG/DL (ref 7–25)
BUN/CREAT SERPL: ABNORMAL (CALC) (ref 6–22)
CALCIUM SERPL-MCNC: 9 MG/DL (ref 8.6–10.3)
CHLORIDE SERPL-SCNC: 104 MMOL/L (ref 98–110)
CHOLEST SERPL-MCNC: 127 MG/DL
CHOLEST/HDLC SERPL: 3.2 (CALC)
CO2 SERPL-SCNC: 27 MMOL/L (ref 20–32)
CREAT SERPL-MCNC: 0.96 MG/DL (ref 0.7–1.22)
EGFRCR SERPLBLD CKD-EPI 2021: 77 ML/MIN/1.73M2
EJECTION FRACTION APICAL 4 CHAMBER: 53.5
EJECTION FRACTION: 53 %
EOSINOPHIL # BLD AUTO: 221 CELLS/UL (ref 15–500)
EOSINOPHIL NFR BLD AUTO: 4.5 %
ERYTHROCYTE [DISTWIDTH] IN BLOOD BY AUTOMATED COUNT: 13 % (ref 11–15)
GLOBULIN SER CALC-MCNC: 2.5 G/DL (CALC) (ref 1.9–3.7)
GLUCOSE SERPL-MCNC: 112 MG/DL (ref 65–99)
HCT VFR BLD AUTO: 39.3 % (ref 38.5–50)
HDLC SERPL-MCNC: 40 MG/DL
HGB BLD-MCNC: 12.7 G/DL (ref 13.2–17.1)
LDLC SERPL CALC-MCNC: 70 MG/DL (CALC)
LEFT VENTRICLE INTERNAL DIMENSION DIASTOLE: 5.8 CM (ref 3.5–6)
LEFT VENTRICULAR OUTFLOW TRACT DIAMETER: 2.6 CM
LV EJECTION FRACTION BIPLANE: 50 %
LYMPHOCYTES # BLD AUTO: 980 CELLS/UL (ref 850–3900)
LYMPHOCYTES NFR BLD AUTO: 20 %
MCH RBC QN AUTO: 29.5 PG (ref 27–33)
MCHC RBC AUTO-ENTMCNC: 32.3 G/DL (ref 32–36)
MCV RBC AUTO: 91.2 FL (ref 80–100)
MITRAL VALVE E/A RATIO: 0.9
MITRAL VALVE E/E' RATIO: 9
MONOCYTES # BLD AUTO: 544 CELLS/UL (ref 200–950)
MONOCYTES NFR BLD AUTO: 11.1 %
NEUTROPHILS # BLD AUTO: 3087 CELLS/UL (ref 1500–7800)
NEUTROPHILS NFR BLD AUTO: 63 %
NONHDLC SERPL-MCNC: 87 MG/DL (CALC)
PLATELET # BLD AUTO: 195 THOUSAND/UL (ref 140–400)
PMV BLD REES-ECKER: 12.5 FL (ref 7.5–12.5)
POTASSIUM SERPL-SCNC: 4.3 MMOL/L (ref 3.5–5.3)
PROT SERPL-MCNC: 6.7 G/DL (ref 6.1–8.1)
RBC # BLD AUTO: 4.31 MILLION/UL (ref 4.2–5.8)
RIGHT VENTRICLE FREE WALL PEAK S': 9.46 CM/S
RIGHT VENTRICLE PEAK SYSTOLIC PRESSURE: 37.5 MMHG
SODIUM SERPL-SCNC: 140 MMOL/L (ref 135–146)
TRICUSPID ANNULAR PLANE SYSTOLIC EXCURSION: 2.1 CM
TRIGL SERPL-MCNC: 88 MG/DL
WBC # BLD AUTO: 4.9 THOUSAND/UL (ref 3.8–10.8)

## 2025-05-21 PROCEDURE — 2500000004 HC RX 250 GENERAL PHARMACY W/ HCPCS (ALT 636 FOR OP/ED): Performed by: INTERNAL MEDICINE

## 2025-05-21 PROCEDURE — A9502 TC99M TETROFOSMIN: HCPCS | Performed by: INTERNAL MEDICINE

## 2025-05-21 PROCEDURE — 78452 HT MUSCLE IMAGE SPECT MULT: CPT | Performed by: INTERNAL MEDICINE

## 2025-05-21 PROCEDURE — 93018 CV STRESS TEST I&R ONLY: CPT | Performed by: INTERNAL MEDICINE

## 2025-05-21 PROCEDURE — 93017 CV STRESS TEST TRACING ONLY: CPT

## 2025-05-21 PROCEDURE — 93306 TTE W/DOPPLER COMPLETE: CPT

## 2025-05-21 PROCEDURE — 93306 TTE W/DOPPLER COMPLETE: CPT | Performed by: INTERNAL MEDICINE

## 2025-05-21 PROCEDURE — 93016 CV STRESS TEST SUPVJ ONLY: CPT | Performed by: INTERNAL MEDICINE

## 2025-05-21 PROCEDURE — 3430000001 HC RX 343 DIAGNOSTIC RADIOPHARMACEUTICALS: Performed by: INTERNAL MEDICINE

## 2025-05-21 PROCEDURE — 78452 HT MUSCLE IMAGE SPECT MULT: CPT

## 2025-05-21 RX ORDER — REGADENOSON 0.08 MG/ML
0.4 INJECTION, SOLUTION INTRAVENOUS ONCE
Status: COMPLETED | OUTPATIENT
Start: 2025-05-21 | End: 2025-05-21

## 2025-05-21 RX ADMIN — TETROFOSMIN 35.4 MILLICURIE: 0.23 INJECTION, POWDER, LYOPHILIZED, FOR SOLUTION INTRAVENOUS at 09:03

## 2025-05-21 RX ADMIN — REGADENOSON 0.4 MG: 0.08 INJECTION, SOLUTION INTRAVENOUS at 09:03

## 2025-05-21 RX ADMIN — TETROFOSMIN 11.8 MILLICURIE: 0.23 INJECTION, POWDER, LYOPHILIZED, FOR SOLUTION INTRAVENOUS at 07:41

## 2025-06-02 ENCOUNTER — APPOINTMENT (OUTPATIENT)
Dept: CARDIOLOGY | Facility: CLINIC | Age: 85
End: 2025-06-02
Payer: MEDICARE

## 2025-06-02 VITALS
HEIGHT: 75 IN | DIASTOLIC BLOOD PRESSURE: 84 MMHG | WEIGHT: 245.6 LBS | BODY MASS INDEX: 30.54 KG/M2 | SYSTOLIC BLOOD PRESSURE: 138 MMHG | HEART RATE: 88 BPM

## 2025-06-02 DIAGNOSIS — Z95.0 PRESENCE OF PERMANENT CARDIAC PACEMAKER: ICD-10-CM

## 2025-06-02 DIAGNOSIS — Z79.01 ANTICOAGULANT LONG-TERM USE: ICD-10-CM

## 2025-06-02 DIAGNOSIS — I48.21 ATRIAL FIBRILLATION, PERMANENT (MULTI): ICD-10-CM

## 2025-06-02 DIAGNOSIS — I25.10 ATHEROSCLEROSIS OF NATIVE CORONARY ARTERY OF NATIVE HEART WITHOUT ANGINA PECTORIS: ICD-10-CM

## 2025-06-02 DIAGNOSIS — R60.1 GENERALIZED EDEMA: Primary | ICD-10-CM

## 2025-06-02 DIAGNOSIS — I49.5 SINOATRIAL NODE DYSFUNCTION (MULTI): ICD-10-CM

## 2025-06-02 DIAGNOSIS — Z86.79 H/O SICK SINUS SYNDROME: ICD-10-CM

## 2025-06-02 DIAGNOSIS — E78.2 MIXED HYPERLIPIDEMIA: ICD-10-CM

## 2025-06-02 DIAGNOSIS — I71.20 THORACIC AORTIC ANEURYSM WITHOUT RUPTURE, UNSPECIFIED PART: ICD-10-CM

## 2025-06-02 DIAGNOSIS — I44.1 ATRIOVENTRICULAR BLOCK, SECOND DEGREE: ICD-10-CM

## 2025-06-02 DIAGNOSIS — Z79.899 HIGH RISK MEDICATION USE: ICD-10-CM

## 2025-06-02 PROCEDURE — 3075F SYST BP GE 130 - 139MM HG: CPT | Performed by: INTERNAL MEDICINE

## 2025-06-02 PROCEDURE — 99214 OFFICE O/P EST MOD 30 MIN: CPT | Performed by: INTERNAL MEDICINE

## 2025-06-02 PROCEDURE — 3079F DIAST BP 80-89 MM HG: CPT | Performed by: INTERNAL MEDICINE

## 2025-06-02 PROCEDURE — 1159F MED LIST DOCD IN RCRD: CPT | Performed by: INTERNAL MEDICINE

## 2025-06-02 PROCEDURE — 1036F TOBACCO NON-USER: CPT | Performed by: INTERNAL MEDICINE

## 2025-06-02 RX ORDER — NITROGLYCERIN 0.4 MG/1
0.4 TABLET SUBLINGUAL EVERY 5 MIN PRN
Qty: 25 TABLET | Refills: 5 | Status: SHIPPED | OUTPATIENT
Start: 2025-06-02

## 2025-06-02 RX ORDER — ATORVASTATIN CALCIUM 10 MG/1
10 TABLET, FILM COATED ORAL DAILY
Qty: 90 TABLET | Refills: 3 | Status: SHIPPED | OUTPATIENT
Start: 2025-06-02

## 2025-06-02 RX ORDER — VALSARTAN 320 MG/1
320 TABLET ORAL DAILY
Qty: 90 TABLET | Refills: 3 | Status: SHIPPED | OUTPATIENT
Start: 2025-06-02

## 2025-06-02 RX ORDER — HYDRALAZINE HYDROCHLORIDE 100 MG/1
TABLET, FILM COATED ORAL
Qty: 180 TABLET | Refills: 3 | Status: SHIPPED | OUTPATIENT
Start: 2025-06-02

## 2025-06-02 RX ORDER — TORSEMIDE 20 MG/1
10 TABLET ORAL
Qty: 25 TABLET | Refills: 3 | Status: SHIPPED | OUTPATIENT
Start: 2025-06-02

## 2025-06-02 NOTE — PROGRESS NOTES
Patient:  Oc Enriquez  YOB: 1940  MRN: [id number]       HPI:       Oc Enriquez is a 85 y.o. male who returns today for cardiac follow-up.   He has a history of permanent atrial fibrillation being managed with a rate control strategy. He was previously on sotalol but had prolonged asymptomatic episodes of atrial fibrillation.  He has a history of sick sinus syndrome and complete heart block for which he underwent a pacemaker implant in 2011. He had a generator change on September 27, 2019 ([employer] Leah XT DR MRI device). He is pacemaker dependent. He has a history of mild coronary artery disease with cardiac catheterization in 2015 showing 40% distal LAD and otherwise trivial disease. He has normal left ventricular systolic function. He has a history of essential hypertension and hyperlipidemia. He had a pulmonary embolism and also a stroke in 1995. He has had recurrent TIAs and had a previous PFO closure. He is chronically anticoagulated with warfarin. He has obstructive sleep apnea for which he uses CPAP. He has chronic lower extremity edema. He has varicose veins and underwent previous laser vein therapy.      He was hospitalized at [facility] on May 24, 2022 for shortness of breath and chest discomfort. He was diagnosed with acute on chronic diastolic congestive heart failure. Chest x-ray showed vascular congestion. BNP was 3277. He was noted to have peripheral edema and amlodipine was discontinued. He complained of a dry cough and ACE inhibitor was changed to valsasrtan. He was given intravenous Lasix and had an approximate 5 L negative fluid balance. He was started on torsemide. He was also diagnosed with exacerbation of COPD. An echocardiogram on May 24, 2022 showed an estimated LV ejection fraction 45 to 50%. There was distal inferior, distal septal, and apical hypokinesis. There was 1-2+ aortic regurgitation. It was felt felt he might have developed a pacemaker mediated  cardiomyopathy as he was continuously pacing in the RV. He was seen by Dr. Potter on June 20, 2022. He recommended continued medical therapy with plans to upgrade to a biventricular system if he has recurrent symptoms. CT scan of the chest May 24, 2022 was negative for pulmonary embolus. A myocardial perfusion study on June 30, 2022 was normal without ischemia or infarction. Calculated LV ejection fraction was 61%.   He was diagnosed with essential tremor and was placed on low-dose propranolol 40 mg twice daily.  This was subsequently increased to 80 mg twice daily.  His tremor significantly improved.  He was previously intolerant to amlodipine and HCTZ.      He was seen by Dr. Potter on May 15, 2025 and reported episodes of shortness of breath with moderate activity.  No chest pain.  He recommended to consider upgrade to a biventricular system if his symptoms persisted.  He was scheduled for a follow-up echocardiogram and Lexiscan myocardial perfusion study.  A Lexiscan myocardial perfusion study on May 21, 2025 was negative for ischemia or infarction.  Calculated LV ejection fraction 51%.  Echocardiogram done the same day showed an estimated LV ejection fraction of 50 to 55%.  Mild (1-2+) mitral regurgitation, mild (1+) tricuspid regurgitation, and moderate (2-3+) aortic regurgitation.  The aortic root measured 4.8 cm and the ascending aorta measured 5.4 cm.  Estimated RVSP 37 mmHg.  A device interrogation in April 2025. Patient has a dual-chamber pacemaker [employer] with battery longevity 3.6 years. Patient is 100% in atrial fibrillation. 99% RV paced.     He has been doing reasonably well.  He notes mild shortness of breath but remains very active.  He has 7.5 acres of land to attend to.  He denies any chest pain.  He denies any orthopnea, PND, or increasing peripheral edema. He denies any palpitations, lightheadedness, near-syncope, or syncope. He denies any fever, chills, or cough. He denies any nausea, vomiting,  or diaphoresis. He denies any hemoptysis, hematemesis, melena, or hematochezia.   His blood pressures are well-controlled.  He is currently free of any anginal or CHF symptoms.  He will continue on warfarin.  His INRs have generally been in normal range.  Continue propranolol, Tiazac, hydralazine, and valsartan.  Continue aspirin and atorvastatin.  He will be scheduled for a follow-up CT scan of the chest without contrast.  Refer to thoracic surgery if any noted increase in size of the aneurysm.  He will follow-up with Dr. Potter regarding any future plans to upgrade his pacemaker device.  Other details as noted below.      Objective:     Vitals:    06/02/25 1304   BP: (!) 164/92   Pulse: 88         Wt Readings from Last 4 Encounters:   06/02/25 111 kg (245 lb 9.6 oz)   05/15/25 111 kg (245 lb 3.2 oz)   12/02/24 112 kg (247 lb)   06/03/24 110 kg (243 lb 9.6 oz)       Allergies:     Allergies   Allergen Reactions    Adhesive Tape-Silicones Unknown          Medications:     Current Outpatient Medications   Medication Instructions    acetaminophen (Tylenol Extra Strength) 500 mg tablet 1 tablet, Every 4 hours PRN    ascorbic acid (Vitamin C) 1,000 mg tablet TAKE 1 TABLET TWO TO THREE TIMES WEEKLY    aspirin 81 mg EC tablet 1 tablet, Daily    atorvastatin (LIPITOR) 10 mg, oral, Daily    cholecalciferol (Vitamin D-3) 5,000 Units tablet 1 tablet    dilTIAZem ER (TIAZAC) 180 mg, oral, Daily    hydrALAZINE (Apresoline) 100 mg tablet 1 TABLET TWICE A DAY    magnesium oxide 400 mg magnesium capsule 1 capsule, Daily    multivitamin (Daily Multi-Vitamin) tablet 1 tablet, Daily    nitroglycerin (Nitrostat) 0.4 mg SL tablet Place under the tongue.    propranolol (INDERAL) 80 mg, oral, Every 12 hours    tamsulosin (Flomax) 0.4 mg 24 hr capsule 1 tablet, Daily    torsemide (DEMADEX) 10 mg, Daily (0630)    trospium (Sanctura) 20 mg tablet 1 tablet, 2 times daily    valsartan (DIOVAN) 320 mg, oral, Daily    vitamin E acid succinate  (vitamin E succinate) 268 mg (400 unit) tablet 1 tablet, Daily    warfarin (Jantoven) 5 mg tablet Daily       Physical Examination:   GENERAL:  Well developed, well nourished, in no acute distress.  CHEST:  Symmetric and nontender.  Pacemaker device noted.  NEURO/PSYCH:  Alert and oriented times three with approppriate behavior and responses.  NECK:  Supple, no JVD, no bruit.  LUNGS:  Clear to auscultation bilaterally, normal respiratory effort.  HEART:  Rate and rhythm regular, paced, with no evident murmur, no gallop appreciated.        There are no rubs, clicks or heaves.  EXTREMITIES:  Warm with good color, no clubbing or cyanosis.  There is no edema noted.  PERIPHERAL VASCULAR:  Pulses present and equally palpable; 2+ throughout.      Lab:     CBC:   Lab Results   Component Value Date    WBC 4.9 05/21/2025    RBC 4.31 05/21/2025    HGB 12.7 (L) 05/21/2025    HCT 39.3 05/21/2025     05/21/2025        CMP:    Lab Results   Component Value Date     05/21/2025    K 4.3 05/21/2025     05/21/2025    CO2 27 05/21/2025    BUN 16 05/21/2025    CREATININE 0.96 05/21/2025    GLUCOSE 112 (H) 05/21/2025    CALCIUM 9.0 05/21/2025       Lipid Profile:    Lab Results   Component Value Date    TRIG 88 05/21/2025    HDL 40 05/21/2025    LDLCALC 70 05/21/2025       BMP:  Lab Results   Component Value Date     05/21/2025     11/25/2024     09/22/2023     01/19/2023     01/07/2022    K 4.3 05/21/2025    K 4.4 11/25/2024    K 4.4 09/22/2023    K 4.1 01/19/2023    K 4.4 01/07/2022     05/21/2025     11/25/2024     09/22/2023     01/19/2023     01/07/2022    CO2 27 05/21/2025    CO2 32 11/25/2024    CO2 32 09/22/2023    CO2 33 (H) 01/19/2023    CO2 31 01/07/2022    BUN 16 05/21/2025    BUN 22 11/25/2024    BUN 12 09/22/2023    BUN 17 01/19/2023    BUN 20 01/07/2022    CREATININE 0.96 05/21/2025    CREATININE 1.13 11/25/2024    CREATININE 1.00 09/22/2023     CREATININE 1.11 01/19/2023    CREATININE 0.93 01/07/2022       CBC:  Lab Results   Component Value Date    WBC 4.9 05/21/2025    WBC 6.1 11/25/2024    WBC 5.1 09/22/2023    WBC 5.1 01/19/2023    WBC 5.8 01/07/2022    RBC 4.31 05/21/2025    RBC 4.20 (L) 11/25/2024    RBC 4.48 (L) 09/22/2023    RBC 4.66 01/19/2023    RBC 5.03 01/07/2022    HGB 12.7 (L) 05/21/2025    HGB 12.6 (L) 11/25/2024    HGB 13.2 (L) 09/22/2023    HGB 13.5 01/19/2023    HGB 14.6 01/07/2022    HCT 39.3 05/21/2025    HCT 38.9 (L) 11/25/2024    HCT 41.2 09/22/2023    HCT 41.5 01/19/2023    HCT 45.8 01/07/2022    MCV 91.2 05/21/2025    MCV 93 11/25/2024    MCV 92 09/22/2023    MCV 89 01/19/2023    MCV 91 01/07/2022    MCH 29.5 05/21/2025    MCH 30.0 11/25/2024    MCHC 32.3 05/21/2025    MCHC 32.4 11/25/2024    MCHC 32.0 09/22/2023    MCHC 32.5 01/19/2023    MCHC 31.9 (L) 01/07/2022    RDW 13.0 05/21/2025    RDW 14.5 11/25/2024    RDW 14.2 09/22/2023    RDW 13.9 01/19/2023    RDW 13.5 01/07/2022     05/21/2025     11/25/2024     09/22/2023     01/19/2023     01/07/2022    MPV 12.5 05/21/2025       Hepatic Function Panel:    Lab Results   Component Value Date    ALKPHOS 44 05/21/2025    ALT 14 05/21/2025    AST 17 05/21/2025    PROT 6.7 05/21/2025    BILITOT 0.8 05/21/2025       HgBA1c:    Lab Results   Component Value Date    HGBA1C 5.9 (H) 06/12/2023       PT/INR:    Lab Results   Component Value Date    PROTIME 22.3 (H) 09/13/2019    INR 2.0 (H) 09/13/2019       Diagnostic Studies:       === 06/30/22 ===    - Impression -  Normal Lexiscan Myoview cardiac perfusion stress test.  No evidence of ischemia or myocardial infarction by perfusion imaging.  Normal left ventricular systolic function, ejection fraction 61%.  Low risk Lexiscan Myoview cardiac perfusion stress test.  Comparison study dated March 9, 2012 was negative for ischemia or  infarction. Calculated LV ejection fraction 68%.      Problem List:     Patient  Active Problem List   Diagnosis    Accelerated essential hypertension    Atherosclerosis of native coronary artery of native heart without angina pectoris    Atrial fibrillation, permanent (Multi)    Atrioventricular block, second degree    Congestive heart failure, chronic, left-sided    Dilated cardiomyopathy (Multi)    H/O sick sinus syndrome    Hyperlipidemia    Palpitations    Presence of permanent cardiac pacemaker    Pulmonary embolism    Sinoatrial node dysfunction (Multi)    Sleep apnea    SOB (shortness of breath) on exertion    Third degree AV block (Multi)    Anticoagulant long-term use    High risk medication use    COPD exacerbation (Multi)       Asessment:     Problem List Items Addressed This Visit           ICD-10-CM    Atherosclerosis of native coronary artery of native heart without angina pectoris I25.10    Relevant Medications    atorvastatin (Lipitor) 10 mg tablet    hydrALAZINE (Apresoline) 100 mg tablet    nitroglycerin (Nitrostat) 0.4 mg SL tablet    valsartan (Diovan) 320 mg tablet    Other Relevant Orders    Comprehensive Metabolic Panel    CBC    Lipid Panel    Follow Up In Cardiology    Atrial fibrillation, permanent (Multi) I48.21    Relevant Medications    atorvastatin (Lipitor) 10 mg tablet    hydrALAZINE (Apresoline) 100 mg tablet    nitroglycerin (Nitrostat) 0.4 mg SL tablet    valsartan (Diovan) 320 mg tablet    Other Relevant Orders    Comprehensive Metabolic Panel    CBC    Lipid Panel    Follow Up In Cardiology    Atrioventricular block, second degree I44.1    Relevant Medications    atorvastatin (Lipitor) 10 mg tablet    hydrALAZINE (Apresoline) 100 mg tablet    valsartan (Diovan) 320 mg tablet    Other Relevant Orders    Comprehensive Metabolic Panel    CBC    Lipid Panel    Follow Up In Cardiology    H/O sick sinus syndrome Z86.79    Relevant Medications    atorvastatin (Lipitor) 10 mg tablet    hydrALAZINE (Apresoline) 100 mg tablet    nitroglycerin (Nitrostat) 0.4 mg SL  tablet    valsartan (Diovan) 320 mg tablet    Other Relevant Orders    Comprehensive Metabolic Panel    CBC    Lipid Panel    Follow Up In Cardiology    Hyperlipidemia E78.5    Relevant Medications    atorvastatin (Lipitor) 10 mg tablet    hydrALAZINE (Apresoline) 100 mg tablet    valsartan (Diovan) 320 mg tablet    Other Relevant Orders    Comprehensive Metabolic Panel    CBC    Lipid Panel    Follow Up In Cardiology    Presence of permanent cardiac pacemaker Z95.0    Relevant Medications    atorvastatin (Lipitor) 10 mg tablet    hydrALAZINE (Apresoline) 100 mg tablet    valsartan (Diovan) 320 mg tablet    Other Relevant Orders    Comprehensive Metabolic Panel    CBC    Lipid Panel    Follow Up In Cardiology    Sinoatrial node dysfunction (Multi) I49.5    Relevant Medications    atorvastatin (Lipitor) 10 mg tablet    hydrALAZINE (Apresoline) 100 mg tablet    nitroglycerin (Nitrostat) 0.4 mg SL tablet    valsartan (Diovan) 320 mg tablet    Other Relevant Orders    Comprehensive Metabolic Panel    CBC    Lipid Panel    Follow Up In Cardiology    Anticoagulant long-term use Z79.01    Relevant Medications    atorvastatin (Lipitor) 10 mg tablet    hydrALAZINE (Apresoline) 100 mg tablet    valsartan (Diovan) 320 mg tablet    Other Relevant Orders    Comprehensive Metabolic Panel    CBC    Lipid Panel    Follow Up In Cardiology    High risk medication use Z79.899    Relevant Medications    atorvastatin (Lipitor) 10 mg tablet    hydrALAZINE (Apresoline) 100 mg tablet    valsartan (Diovan) 320 mg tablet    Other Relevant Orders    Comprehensive Metabolic Panel    CBC    Lipid Panel    Follow Up In Cardiology     Other Visit Diagnoses         Codes      Generalized edema    -  Primary R60.1    Relevant Medications    torsemide (Demadex) 20 mg tablet      Thoracic aortic aneurysm without rupture, unspecified part     I71.20    Relevant Orders    CT chest wo IV contrast            1. Longstandingâ€“permanent atrial  fibrillation, on rate control strategy  2. High risk medication  3. Sinus node dysfunction, status post dual-chamber pacemaker, resolved  4. Status post dual-chamber pacemaker. Device interrogation reviewed with patient during this office visit  5. Long-term anticoagulant therapy with warfarin. No evidence of bleeding  6. Hypertension, controlled  7. Hyperlipidemia, controlled  8. Coronary artery disease with percutaneous coronary interventions in the past, stable  9. Cardiomyopathy with a low ventricular ejection fraction 45 to 50% by echocardiogram in May 2022.  10. Congestive heart failure new Heart Association class II, currently compensated  11.  Shortness of breath, multifactorial    Scribe Attestation  By signing my name below, I, Leola Martinez, [facility]     , Scribe   attest that this documentation has been prepared under the direction and in the presence of Emery Bragg MD.    .jason

## 2025-06-02 NOTE — PATIENT INSTRUCTIONS
CHANGE TORSEMIDE 20 MG TAB TO 10 MG (1/2 TAB) EVERY OTHER DAY    FASTING LABS, FASTING FROM MIDNIGHT THE NIGHT BEFORE TO BE DONE 4-7 DAYS PRIOR TO YOUR APPOINTMENT WITH DR STEPHENSON IN 6 MONTHS    TO SCHEDULE YOUR LAB APPOINTMENT CALL [facility] AT [phone]    Please bring all medicines, vitamins, and herbal supplements with you in original bottles to every appointment! This is the best way to ensure your medication list in your chart is accurate.    Prescriptions will not be filled unless you are compliant with your follow up appointments or have a follow up appointment scheduled as per instruction of your physician. Refills should be requested at the time of your visit.    DID YOU KNOW  We have a pharmacy here in the [facility].  They can fill all prescriptions, not just cardiac medications.  Prescriptions from other pharmacies can easily be transferred to the  pharmacy by the  pharmacist on site.   pharmacies offer FREE HOME DELIVERY on medications to anywhere in [place]. They can sync your medications. Typically prescriptions can be ready in 10 - 15 minutes. If pharmacy is unable to fill your  prescription or if cost is more than your paying now the Pharmacist can easily transfer back to your Pharmacy of choice. Pharmacy phone # [phone].

## 2025-06-06 ENCOUNTER — ANTI-COAG VISIT (OUTPATIENT)
Age: 85
End: 2025-06-06
Payer: MEDICARE

## 2025-06-06 DIAGNOSIS — T81.718D IATROGENIC PULMONARY EMBOLISM, SUBSEQUENT ENCOUNTER (HCC): Primary | ICD-10-CM

## 2025-06-06 DIAGNOSIS — I26.99 IATROGENIC PULMONARY EMBOLISM, SUBSEQUENT ENCOUNTER (HCC): Primary | ICD-10-CM

## 2025-06-06 LAB
INTERNATIONAL NORMALIZATION RATIO, POC: 2.8 (ref 2–3)
PROTHROMBIN TIME, POC: 0

## 2025-06-06 PROCEDURE — 85610 PROTHROMBIN TIME: CPT | Performed by: PHARMACIST

## 2025-06-06 PROCEDURE — 99211 OFF/OP EST MAY X REQ PHY/QHP: CPT | Performed by: PHARMACIST

## 2025-06-06 NOTE — PROGRESS NOTES
Mr. Ian Bates is a 85 y.o. y/o male with history of PE who presents today for anticoagulation monitoring and adjustment.  INR 2.8 is therapeutic for this patient (goal range 2-3) and is reflective of 32.5 mg TWD  Patient verifies current dosing regimen, patient able to verbally recall dose  Patient reports NO  missed doses since last INR   Patient denies s/sx clotting and/or stroke  Patient denies hematuria, epistaxis, rectal bleeding  Patient denies changes in diet, alcohol, or tobacco use  Reviewed medication list and drug allergies with patient, updated any medication additions or modifications accordingly  Patient also denies any pending medical or dental procedures scheduled at this time  Patient was instructed to continue 32.5 mg TWD and RTC 6 weeks      Faraz Ortega R.Ph.  2025  8:00 AM    For Pharmacy Admin Tracking Only    Intervention Detail: Adherence Monitorin  Total # of Interventions Recommended: 0  Total # of Interventions Accepted: 0  Time Spent (min): 15

## 2025-06-16 ENCOUNTER — ANCILLARY PROCEDURE (OUTPATIENT)
Facility: HOSPITAL | Age: 85
End: 2025-06-16
Payer: MEDICARE

## 2025-06-16 DIAGNOSIS — I71.20 THORACIC AORTIC ANEURYSM WITHOUT RUPTURE, UNSPECIFIED PART: ICD-10-CM

## 2025-06-16 PROCEDURE — 71250 CT THORAX DX C-: CPT

## 2025-06-16 PROCEDURE — 71250 CT THORAX DX C-: CPT | Performed by: RADIOLOGY

## 2025-06-23 ENCOUNTER — TELEPHONE (OUTPATIENT)
Dept: CARDIOLOGY | Facility: CLINIC | Age: 85
End: 2025-06-23
Payer: MEDICARE

## 2025-06-23 DIAGNOSIS — I71.20 THORACIC AORTIC ANEURYSM (TAA), UNSPECIFIED PART, UNSPECIFIED WHETHER RUPTURED: ICD-10-CM

## 2025-06-23 NOTE — TELEPHONE ENCOUNTER
Advised patient of message and verbalized understanding.    Results forward to PCP   Ara Calloway MA

## 2025-07-18 ENCOUNTER — ANTI-COAG VISIT (OUTPATIENT)
Age: 85
End: 2025-07-18
Payer: MEDICARE

## 2025-07-18 DIAGNOSIS — T81.718D IATROGENIC PULMONARY EMBOLISM, SUBSEQUENT ENCOUNTER (HCC): Primary | ICD-10-CM

## 2025-07-18 DIAGNOSIS — I26.99 IATROGENIC PULMONARY EMBOLISM, SUBSEQUENT ENCOUNTER (HCC): Primary | ICD-10-CM

## 2025-07-18 LAB
INTERNATIONAL NORMALIZATION RATIO, POC: 2.6
PROTHROMBIN TIME, POC: 0

## 2025-07-18 PROCEDURE — 99211 OFF/OP EST MAY X REQ PHY/QHP: CPT

## 2025-07-18 PROCEDURE — 85610 PROTHROMBIN TIME: CPT

## 2025-07-18 NOTE — PROGRESS NOTES
Mr. Ian Bates is a 85 y.o. y/o male with history of PE who presents today for anticoagulation monitoring and adjustment.  INR 2.6 is therapeutic for this patient (goal range 2.0-3.0) and is reflective of 32.5 mg TWD  Patient verifies current dosing regimen, patient able to verbally recall dose  Patient reports 0 missed doses since last INR   Patient denies s/sx clotting and/or stroke  Patient denies hematuria, epistaxis, rectal bleeding  Patient denies changes in diet, alcohol, or tobacco use  Reviewed medication list and drug allergies with patient, updated any medication additions or modifications accordingly  Patient also denies any pending medical or dental procedures scheduled at this time  Patient was instructed to continue current regimen 5 mg daily except 2.5 mg on  (32.5 mg TWD) and RTC 6 weeks    Zuleika Bella, PharmD, BCPS   For Pharmacy Admin Tracking Only    Intervention Detail: Adherence Monitorin  Total # of Interventions Recommended: 1  Total # of Interventions Accepted: 1  Time Spent (min): 15

## 2025-07-21 ENCOUNTER — APPOINTMENT (OUTPATIENT)
Dept: CARDIAC SURGERY | Facility: CLINIC | Age: 85
End: 2025-07-21
Payer: MEDICARE

## 2025-07-28 ENCOUNTER — OFFICE VISIT (OUTPATIENT)
Dept: CARDIAC SURGERY | Facility: CLINIC | Age: 85
End: 2025-07-28
Payer: MEDICARE

## 2025-07-28 VITALS
DIASTOLIC BLOOD PRESSURE: 79 MMHG | TEMPERATURE: 97.3 F | SYSTOLIC BLOOD PRESSURE: 137 MMHG | HEART RATE: 77 BPM | BODY MASS INDEX: 29.72 KG/M2 | WEIGHT: 239 LBS | OXYGEN SATURATION: 94 % | HEIGHT: 75 IN

## 2025-07-28 DIAGNOSIS — I71.21 ANEURYSM OF THE ASCENDING AORTA, WITHOUT RUPTURE: Primary | ICD-10-CM

## 2025-07-28 PROCEDURE — 99205 OFFICE O/P NEW HI 60 MIN: CPT | Performed by: THORACIC SURGERY (CARDIOTHORACIC VASCULAR SURGERY)

## 2025-07-28 PROCEDURE — 1159F MED LIST DOCD IN RCRD: CPT | Performed by: THORACIC SURGERY (CARDIOTHORACIC VASCULAR SURGERY)

## 2025-07-28 PROCEDURE — 3078F DIAST BP <80 MM HG: CPT | Performed by: THORACIC SURGERY (CARDIOTHORACIC VASCULAR SURGERY)

## 2025-07-28 PROCEDURE — 3075F SYST BP GE 130 - 139MM HG: CPT | Performed by: THORACIC SURGERY (CARDIOTHORACIC VASCULAR SURGERY)

## 2025-07-28 PROCEDURE — 99212 OFFICE O/P EST SF 10 MIN: CPT | Performed by: THORACIC SURGERY (CARDIOTHORACIC VASCULAR SURGERY)

## 2025-07-28 ASSESSMENT — PATIENT HEALTH QUESTIONNAIRE - PHQ9
2. FEELING DOWN, DEPRESSED OR HOPELESS: NOT AT ALL
1. LITTLE INTEREST OR PLEASURE IN DOING THINGS: NOT AT ALL
SUM OF ALL RESPONSES TO PHQ9 QUESTIONS 1 AND 2: 0

## 2025-07-30 ENCOUNTER — HOSPITAL ENCOUNTER (OUTPATIENT)
Dept: RADIOLOGY | Facility: HOSPITAL | Age: 85
Discharge: HOME | End: 2025-07-30
Payer: MEDICARE

## 2025-07-30 DIAGNOSIS — I71.21 ANEURYSM OF THE ASCENDING AORTA, WITHOUT RUPTURE: ICD-10-CM

## 2025-07-30 PROCEDURE — 74174 CTA ABD&PLVS W/CONTRAST: CPT

## 2025-07-30 PROCEDURE — 74174 CTA ABD&PLVS W/CONTRAST: CPT | Performed by: INTERNAL MEDICINE

## 2025-07-30 PROCEDURE — 2550000001 HC RX 255 CONTRASTS: Performed by: NURSE PRACTITIONER

## 2025-07-30 PROCEDURE — 71275 CT ANGIOGRAPHY CHEST: CPT | Performed by: INTERNAL MEDICINE

## 2025-07-30 RX ADMIN — IOHEXOL 100 ML: 350 INJECTION, SOLUTION INTRAVENOUS at 08:29

## 2025-08-02 NOTE — PROGRESS NOTES
Referred by Dr. Bragg for evaluation of his aortic aneurysm      History Of Present Illness:    Nikko Enriquez is a 85 y.o. male presenting with aortic root and ascending aortic aneurysm.     Past Medical History:  He has a past medical history of Sleep apnea (3/2013) and Stroke (Multi) (6/1995 partical of calcium shut down blood flow to left eye retina.    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  Social History:  He reports that he has never smoked. He has never used smokeless tobacco. He reports current alcohol use. He reports that he does not use drugs.    Family History:  [Family History]    [Family History]  Problem Relation Name Age of Onset    Cancer Father  70 - 79    Heart disease Mother  80 - 99        Allergies:  Adhesive tape-silicones    Outpatient Medications:  Current Outpatient Medications   Medication Instructions    acetaminophen (Tylenol Extra Strength) 500 mg tablet 1 tablet, Every 4 hours PRN    ascorbic acid (Vitamin C) 1,000 mg tablet TAKE 1 TABLET TWO TO THREE TIMES WEEKLY    aspirin 81 mg EC tablet 1 tablet, Daily    atorvastatin (LIPITOR) 10 mg, oral, Daily    cholecalciferol (Vitamin D-3) 5,000 Units tablet 1 tablet    dilTIAZem ER (TIAZAC) 180 mg, oral, Daily    hydrALAZINE (Apresoline) 100 mg tablet 1 TABLET TWICE A DAY    magnesium oxide 400 mg magnesium capsule 1 capsule, Daily    multivitamin (Daily Multi-Vitamin) tablet 1 tablet, Daily    nitroglycerin (NITROSTAT) 0.4 mg, sublingual, Every 5 min PRN    propranolol (INDERAL) 80 mg, oral, Every 12 hours    tamsulosin (Flomax) 0.4 mg 24 hr capsule 1 tablet, Daily    torsemide (DEMADEX) 10 mg,  "oral, Daily (0630)    trospium (Sanctura) 20 mg tablet 1 tablet, 2 times daily    valsartan (DIOVAN) 320 mg, oral, Daily    vitamin E acid succinate (vitamin E succinate) 268 mg (400 unit) tablet 1 tablet, Daily    warfarin (Jantoven) 5 mg tablet Daily        Last Recorded Vitals:  Vitals:    07/28/25 1526   BP: 137/79   Pulse: 77   Temp: 36.3 °C (97.3 °F)   SpO2: 94%   Weight: 108 kg (239 lb)   Height: 1.905 m (6' 3\")     Physical Exam:  Constitutional:       Appearance: Normal appearance.      Cardiovascular:      Rate and Rhythm: Normal rate and regular rhythm.   Pulmonary:      Effort: Pulmonary effort is normal.      Breath sounds: Normal breath sounds.      Neurological:      General: No focal deficit present.      Mental Status: He is alert and oriented to person, place, and time.      Psychiatric:         Mood and Affect: Mood normal.         Behavior: Behavior normal.      Last Labs:  CBC -  Lab Results   Component Value Date    WBC 4.9 05/21/2025    HGB 12.7 (L) 05/21/2025    HCT 39.3 05/21/2025    MCV 91.2 05/21/2025     05/21/2025       CMP -  Lab Results   Component Value Date    CALCIUM 9.0 05/21/2025    PROT 6.7 05/21/2025    ALBUMIN 4.2 05/21/2025    AST 17 05/21/2025    ALT 14 05/21/2025    ALKPHOS 44 05/21/2025    BILITOT 0.8 05/21/2025       LIPID PANEL -   Lab Results   Component Value Date    CHOL 127 05/21/2025    TRIG 88 05/21/2025    HDL 40 05/21/2025    CHHDL 3.2 05/21/2025    LDLF 70 09/22/2023    VLDL 22 11/25/2024    NHDL 87 05/21/2025       RENAL FUNCTION PANEL -   Lab Results   Component Value Date    GLUCOSE 112 (H) 05/21/2025     05/21/2025    K 4.3 05/21/2025     05/21/2025    CO2 27 05/21/2025    ANIONGAP 11 11/25/2024    BUN 16 05/21/2025    CREATININE 0.96 05/21/2025    GFRMALE 74 09/22/2023    CALCIUM 9.0 05/21/2025    ALBUMIN 4.2 05/21/2025        Lab Results   Component Value Date    HGBA1C 5.9 (H) 06/12/2023       Last Cardiology Tests:  ECG:  No results " found for this or any previous visit from the past 1095 days.  Echo:  Transthoracic Echo Complete 05/21/2025  CONCLUSIONS:   1. The left ventricular systolic function is low normal, with a visually estimated ejection fraction of 50-55%.   2. No regional wall motion abnormalities.   3. Left ventricular diastolic filling was indeterminate due to right ventricular pacing.   4. Pacemaker device artifact noted in the right atrium and right ventricle.   5. The left atrial size is severely dilated.   6. Mild (1-2+) mitral regurgitation.   7. Mild (1+) tricuspid regurgitation with estimated RVSP of 37 mmHg.   8. 2-3+ aortic regurgitation.   9. Moderate aortic valve regurgitation.  10. The aortic root measures 4.8 cm.      The ascending aorta measures 5.4 cm.      Consider alternative form of imaging such as CT.  11. The inferior vena cava appears moderately dilated, with IVC inspiratory collapse less than 50%.  12. Comparison study dated 5/24/22 at [facility] showed an LVEF 45-50% with mild AI and TR.  13. There is moderate dilatation of the aortic root.  14. There is severe dilatation of the ascending aorta.    Ejection Fractions:  EF   Date/Time Value Ref Range Status   05/21/2025 10:41 AM 53 %    CT chest w/o contrast  The proximal ascending aorta is aneurysmally dilated at 53 mm  diameter. The mid ascending aorta measures 59 mm in diameter. The  anterior aspect of the aortic arch is still dilated at 52 mm  diameter. The mid arch measures 36 mm. The posterior arch measures 38  mm in diameter. The mid descending thoracic aorta measures 37 mm in  diameter. In the hiatus, the distal aorta measures 33 mm.    Assessment/Plan   Mr Enriquez is an 86 yo male patient who presents with an aortic root and ascending aortic aneurysm.  After reviewing relevant clinical data and imaging we had a detailed discussion regarding his aortic pathology which was seen on his CT chest without contrast, the plan is to repeat a CT angio chest abdomen  and pelvis and to schedule a follow-up visit to discuss if surgery is indicated.        Ventura Whitehead MD

## 2025-08-02 NOTE — PROGRESS NOTES
Patient referred by Dr. Bragg for evaluation of his aortic aneurysm     History Of Present Illness:    Nikko Enriquez is a 85 y.o. male presenting with aortic root and ascending aortic aneurysm.      Past Medical History:  He has a past medical history of Sleep apnea (3/2013) and Stroke (Multi) (6/1995 partical of calcium shut down blood flow to left eye retna {doctor treated it as a stroke}}).    Past Surgical History:  He has a past surgical history that includes Other surgical history (09/09/2021); Other surgical history (09/09/2021); Other surgical history (09/09/2021); Other surgical history (09/09/2021); Other surgical history (09/09/2021); Other surgical history (12/29/2021); Insert / replace / remove pacemaker; and Vein Surgery (8/2019 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).      Social History:  He reports that he has never smoked. He has never used smokeless tobacco. He reports current alcohol use. He reports that he does not use drugs.       Allergies:  Adhesive tape-silicones    Outpatient Medications:  Current Outpatient Medications   Medication Instructions    acetaminophen (Tylenol Extra Strength) 500 mg tablet 1 tablet, Every 4 hours PRN    ascorbic acid (Vitamin C) 1,000 mg tablet TAKE 1 TABLET TWO TO THREE TIMES WEEKLY    aspirin 81 mg EC tablet 1 tablet, Daily    atorvastatin (LIPITOR) 10 mg, oral, Daily    cholecalciferol (Vitamin D-3) 5,000 Units tablet 1 tablet    dilTIAZem ER (TIAZAC) 180 mg, oral, Daily    hydrALAZINE (Apresoline) 100 mg tablet 1 TABLET TWICE A DAY    magnesium oxide 400 mg magnesium capsule 1 capsule, Daily    multivitamin (Daily Multi-Vitamin) tablet 1 tablet, Daily    nitroglycerin (NITROSTAT) 0.4 mg, sublingual, Every 5 min PRN    propranolol (INDERAL) 80 mg, oral, Every 12 hours    tamsulosin (Flomax) 0.4 mg 24 hr capsule 1 tablet, Daily    torsemide (DEMADEX) 10 mg, oral, Daily (0630)    trospium (Sanctura) 20 mg tablet 1 tablet, 2 times daily    valsartan (DIOVAN) 320 mg,  "oral, Daily    vitamin E acid succinate (vitamin E succinate) 268 mg (400 unit) tablet 1 tablet, Daily    warfarin (Jantoven) 5 mg tablet Daily        Last Recorded Vitals:  Vitals:    07/28/25 1526   BP: 137/79   Pulse: 77   Temp: 36.3 °C (97.3 °F)   SpO2: 94%   Weight: 108 kg (239 lb)   Height: 1.905 m (6' 3\")     Physical Exam  Constitutional:       Appearance: Normal appearance.     Cardiovascular:      Rate and Rhythm: Normal rate and regular rhythm.   Pulmonary:      Effort: Pulmonary effort is normal.      Breath sounds: Normal breath sounds.     Neurological:      General: No focal deficit present.      Mental Status: He is alert and oriented to person, place, and time.     Psychiatric:         Mood and Affect: Mood normal.         Behavior: Behavior normal.      Last Labs:  CBC -  Lab Results   Component Value Date    WBC 4.9 05/21/2025    HGB 12.7 (L) 05/21/2025    HCT 39.3 05/21/2025    MCV 91.2 05/21/2025     05/21/2025       CMP -  Lab Results   Component Value Date    CALCIUM 9.0 05/21/2025    PROT 6.7 05/21/2025    ALBUMIN 4.2 05/21/2025    AST 17 05/21/2025    ALT 14 05/21/2025    ALKPHOS 44 05/21/2025    BILITOT 0.8 05/21/2025       LIPID PANEL -   Lab Results   Component Value Date    CHOL 127 05/21/2025    TRIG 88 05/21/2025    HDL 40 05/21/2025    CHHDL 3.2 05/21/2025    LDLF 70 09/22/2023    VLDL 22 11/25/2024    NHDL 87 05/21/2025       RENAL FUNCTION PANEL -   Lab Results   Component Value Date    GLUCOSE 112 (H) 05/21/2025     05/21/2025    K 4.3 05/21/2025     05/21/2025    CO2 27 05/21/2025    ANIONGAP 11 11/25/2024    BUN 16 05/21/2025    CREATININE 0.96 05/21/2025    GFRMALE 74 09/22/2023    CALCIUM 9.0 05/21/2025    ALBUMIN 4.2 05/21/2025        Lab Results   Component Value Date    HGBA1C 5.9 (H) 06/12/2023       Last Cardiology Tests:    Echo:  Transthoracic Echo Complete 05/21/2025     CONCLUSIONS:   1. The left ventricular systolic function is low normal, with a " visually estimated ejection fraction of 50-55%.   2. No regional wall motion abnormalities.   3. Left ventricular diastolic filling was indeterminate due to right ventricular pacing.   4. Pacemaker device artifact noted in the right atrium and right ventricle.   5. The left atrial size is severely dilated.   6. Mild (1-2+) mitral regurgitation.   7. Mild (1+) tricuspid regurgitation with estimated RVSP of 37 mmHg.   8. 2-3+ aortic regurgitation.   9. Moderate aortic valve regurgitation.  10. The aortic root measures 4.8 cm.      The ascending aorta measures 5.4 cm.      Consider alternative form of imaging such as CT.  11. The inferior vena cava appears moderately dilated, with IVC inspiratory collapse less than 50%.  12. Comparison study dated 5/24/22 at [facility] showed an LVEF 45-50% with mild AI and TR.  13. There is moderate dilatation of the aortic root.  14. There is severe dilatation of the ascending aorta.    Ejection Fractions:  EF   Date/Time Value Ref Range Status   05/21/2025 10:41 AM 53 %    CT chest w/o contrast  FINDINGS:  CHEST WALL/BASE OF THE NECK:  The chest wall was grossly intact.  Heterogeneous multinodular enlargement of the thyroid gland with a  sizable left-sided retrosternal component.      LUNGS/ PLEURA/ AND TRACHEA:  Mild eventration of the ventral aspect of the right diaphragm with  mild associated parenchymal scarring at the base of the right middle  lobe just above the elevated diaphragm. Scant scarring at the lateral  base of the left lingula. There is a subpleural calcified granuloma  in the posterolateral left lower lobe on image 167. No mass or  pneumonia in either lung. No pleural effusion.  No pneumothorax.  The trachea was grossly intact.      MEDIASTINUM/NIKITA:  No axillary adenopathy. No gross mass in either hilum in this  unenhanced exam. There are multiple scattered small nonspecific  mediastinal lymph nodes measuring 9 mm short axis or less. There are  several tiny calcified  lymph nodes in the left hilum and in the  subcarinal mediastinum. Left-sided cardiac pacemaker. Moderate  cardiomegaly. There are advanced four-vessel coronary artery  calcifications. No pericardial effusion.  The proximal ascending aorta is aneurysmally dilated at 53 mm  diameter. The mid ascending aorta measures 59 mm in diameter. The  anterior aspect of the aortic arch is still dilated at 52 mm  diameter. The mid arch measures 36 mm. The posterior arch measures 38  mm in diameter. The mid descending thoracic aorta measures 37 mm in  diameter. In the hiatus, the distal aorta measures 33 mm.      BONES:  No destructive lytic or blastic bone lesion. Mild multilevel thoracic  spine disc space narrowing with endplate osteophytosis. Scattered  distal thoracic spine vacuum disc phenomenon.      UPPER ABDOMEN:  Previous cholecystectomy. Exophytic upper pole left renal cyst  measures 46 mm in diameter. 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 There is  also mild-to-moderate proximal celiac calcification. The remainder of  the imaged upper abdomen was grossly intact.      IMPRESSION:  There is aneurysmal dilatation of the ascending aorta as described,  decreasing down to 36 mm diameter in the mid arch level. There are  mild-to-moderate mural calcifications throughout the thoracic aorta.  Please see above for details.      Moderate cardiomegaly. Cardiac pacemaker on the left. Advanced  four-vessel coronary artery calcifications.      Mild nonspecific mediastinal adenopathy as described, most likely  reactive.      Rather prominent multinodular goiter, asymmetrically larger on the  left than on the right, with a sizable left-sided retrosternal  component.      Thoracic spine DJD as described.      Previous cholecystectomy.      Exophytic upper pole left renal cyst.      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    Assessment/Plan   Mr Enriquez is an 84 yo male patient who presents with an aortic root and ascending aortic aneurysm.  After reviewing relevant clinical data and imaging we had a detailed discussion regarding his aortic pathology which was seen on his CT chest without contrast, the plan is to repeat a CT angio chest abdomen and pelvis and to schedule a follow-up visit to discuss if surgery is indicated.    Ventura Whitehead MD

## 2025-08-02 NOTE — PROGRESS NOTES
Patient referred by Dr. Bragg for evaluation of his aortic aneurysm     History Of Present Illness:    Nikko Enriquez is a 85 y.o. male presenting with aortic root and ascending aortic aneurysm.    Past Medical History:  He has a past medical history of Sleep apnea (3/2013) and Stroke (Multi) (6/1995 partical of calcium shut down blood flow to left eye retna {doctor treated it as a stroke}}).    Past Surgical History:  He has a past surgical history that includes Other surgical history (09/09/2021); Other surgical history (09/09/2021); Other surgical history (09/09/2021); Other surgical history (09/09/2021); Other surgical history (09/09/2021); Other surgical history (12/29/2021); Insert / replace / remove pacemaker; and Vein Surgery (8/2019 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).      Social History:  He reports that he has never smoked. He has never used smokeless tobacco. He reports current alcohol use. He reports that he does not use drugs.       Allergies:  Adhesive tape-silicones    Outpatient Medications:  Current Outpatient Medications   Medication Instructions    acetaminophen (Tylenol Extra Strength) 500 mg tablet 1 tablet, Every 4 hours PRN    ascorbic acid (Vitamin C) 1,000 mg tablet TAKE 1 TABLET TWO TO THREE TIMES WEEKLY    aspirin 81 mg EC tablet 1 tablet, Daily    atorvastatin (LIPITOR) 10 mg, oral, Daily    cholecalciferol (Vitamin D-3) 5,000 Units tablet 1 tablet    dilTIAZem ER (TIAZAC) 180 mg, oral, Daily    hydrALAZINE (Apresoline) 100 mg tablet 1 TABLET TWICE A DAY    magnesium oxide 400 mg magnesium capsule 1 capsule, Daily    multivitamin (Daily Multi-Vitamin) tablet 1 tablet, Daily    nitroglycerin (NITROSTAT) 0.4 mg, sublingual, Every 5 min PRN    propranolol (INDERAL) 80 mg, oral, Every 12 hours    tamsulosin (Flomax) 0.4 mg 24 hr capsule 1 tablet, Daily    torsemide (DEMADEX) 10 mg, oral, Daily (0630)    trospium (Sanctura) 20 mg tablet 1 tablet, 2 times daily    valsartan (DIOVAN) 320 mg, oral,  "Daily    vitamin E acid succinate (vitamin E succinate) 268 mg (400 unit) tablet 1 tablet, Daily    warfarin (Jantoven) 5 mg tablet Daily        Last Recorded Vitals:  Vitals:    07/28/25 1526   BP: 137/79   Pulse: 77   Temp: 36.3 °C (97.3 °F)   SpO2: 94%   Weight: 108 kg (239 lb)   Height: 1.905 m (6' 3\")     Physical Exam  Constitutional:       Appearance: Normal appearance.     Cardiovascular:      Rate and Rhythm: Normal rate and regular rhythm.   Pulmonary:      Effort: Pulmonary effort is normal.      Breath sounds: Normal breath sounds.     Neurological:      General: No focal deficit present.      Mental Status: He is alert and oriented to person, place, and time.     Psychiatric:         Mood and Affect: Mood normal.         Behavior: Behavior normal.      Last Labs:  CBC -  Lab Results   Component Value Date    WBC 4.9 05/21/2025    HGB 12.7 (L) 05/21/2025    HCT 39.3 05/21/2025    MCV 91.2 05/21/2025     05/21/2025       CMP -  Lab Results   Component Value Date    CALCIUM 9.0 05/21/2025    PROT 6.7 05/21/2025    ALBUMIN 4.2 05/21/2025    AST 17 05/21/2025    ALT 14 05/21/2025    ALKPHOS 44 05/21/2025    BILITOT 0.8 05/21/2025       LIPID PANEL -   Lab Results   Component Value Date    CHOL 127 05/21/2025    TRIG 88 05/21/2025    HDL 40 05/21/2025    CHHDL 3.2 05/21/2025    LDLF 70 09/22/2023    VLDL 22 11/25/2024    NHDL 87 05/21/2025       RENAL FUNCTION PANEL -   Lab Results   Component Value Date    GLUCOSE 112 (H) 05/21/2025     05/21/2025    K 4.3 05/21/2025     05/21/2025    CO2 27 05/21/2025    ANIONGAP 11 11/25/2024    BUN 16 05/21/2025    CREATININE 0.96 05/21/2025    GFRMALE 74 09/22/2023    CALCIUM 9.0 05/21/2025    ALBUMIN 4.2 05/21/2025        Lab Results   Component Value Date    HGBA1C 5.9 (H) 06/12/2023     Transthoracic Echo Complete 05/21/2025     CONCLUSIONS:   1. The left ventricular systolic function is low normal, with a visually estimated ejection fraction of " 50-55%.   2. No regional wall motion abnormalities.   3. Left ventricular diastolic filling was indeterminate due to right ventricular pacing.   4. Pacemaker device artifact noted in the right atrium and right ventricle.   5. The left atrial size is severely dilated.   6. Mild (1-2+) mitral regurgitation.   7. Mild (1+) tricuspid regurgitation with estimated RVSP of 37 mmHg.   8. 2-3+ aortic regurgitation.   9. Moderate aortic valve regurgitation.  10. The aortic root measures 4.8 cm.      The ascending aorta measures 5.4 cm.      Consider alternative form of imaging such as CT.  11. The inferior vena cava appears moderately dilated, with IVC inspiratory collapse less than 50%.  12. Comparison study dated 5/24/22 at [facility] showed an LVEF 45-50% with mild AI and TR.  13. There is moderate dilatation of the aortic root.  14. There is severe dilatation of the ascending aorta.      EF   Date/Time Value Ref Range Status   05/21/2025 10:41 AM 53 %      CT chest w/o contrast  FINDINGS:  CHEST WALL/BASE OF THE NECK:  The chest wall was grossly intact.  Heterogeneous multinodular enlargement of the thyroid gland with a  sizable left-sided retrosternal component.      LUNGS/ PLEURA/ AND TRACHEA:  Mild eventration of the ventral aspect of the right diaphragm with  mild associated parenchymal scarring at the base of the right middle  lobe just above the elevated diaphragm. Scant scarring at the lateral  base of the left lingula. There is a subpleural calcified granuloma  in the posterolateral left lower lobe on image 167. No mass or  pneumonia in either lung. No pleural effusion.  No pneumothorax.  The trachea was grossly intact.      MEDIASTINUM/NIKITA:  No axillary adenopathy. No gross mass in either hilum in this  unenhanced exam. There are multiple scattered small nonspecific  mediastinal lymph nodes measuring 9 mm short axis or less. There are  several tiny calcified lymph nodes in the left hilum and in the  subcarinal  mediastinum. Left-sided cardiac pacemaker. Moderate  cardiomegaly. There are advanced four-vessel coronary artery  calcifications. No pericardial effusion.  The proximal ascending aorta is aneurysmally dilated at 53 mm  diameter. The mid ascending aorta measures 59 mm in diameter. The  anterior aspect of the aortic arch is still dilated at 52 mm  diameter. The mid arch measures 36 mm. The posterior arch measures 38  mm in diameter. The mid descending thoracic aorta measures 37 mm in  diameter. In the hiatus, the distal aorta measures 33 mm.      BONES:  No destructive lytic or blastic bone lesion. Mild multilevel thoracic  spine disc space narrowing with endplate osteophytosis. Scattered  distal thoracic spine vacuum disc phenomenon.      UPPER ABDOMEN:  Previous cholecystectomy. Exophytic upper pole left renal cyst  measures 46 mm in diameter. 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 There is  also mild-to-moderate proximal celiac calcification. The remainder of  the imaged upper abdomen was grossly intact.      IMPRESSION:  There is aneurysmal dilatation of the ascending aorta as described,  decreasing down to 36 mm diameter in the mid arch level. There are  mild-to-moderate mural calcifications throughout the thoracic aorta.  Please see above for details.      Moderate cardiomegaly. Cardiac pacemaker on the left. Advanced  four-vessel coronary artery calcifications.      Mild nonspecific mediastinal adenopathy as described, most likely  reactive.      Rather prominent multinodular goiter, asymmetrically larger on the  left than on the right, with a sizable left-sided retrosternal  component.      Thoracic spine DJD as described.      Previous cholecystectomy.      Exophytic upper pole left renal cyst.      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    Assessment and Plan  Mr Enriquez is an 86 yo male  patient who presents with an aortic root and ascending aortic aneurysm.  After reviewing relevant clinical data and imaging we had a detailed discussion regarding his aortic pathology which was seen on his CT chest without contrast, the plan is to repeat a CT angio chest abdomen and pelvis and to schedule a follow-up visit to discuss if surgery is indicated.    Ventura Whitehead MD

## 2025-08-04 ENCOUNTER — APPOINTMENT (OUTPATIENT)
Dept: CARDIOLOGY | Facility: CLINIC | Age: 85
End: 2025-08-04
Payer: MEDICARE

## 2025-08-04 ENCOUNTER — OFFICE VISIT (OUTPATIENT)
Dept: CARDIAC SURGERY | Facility: CLINIC | Age: 85
End: 2025-08-04
Payer: MEDICARE

## 2025-08-04 VITALS
DIASTOLIC BLOOD PRESSURE: 74 MMHG | HEIGHT: 75 IN | SYSTOLIC BLOOD PRESSURE: 136 MMHG | BODY MASS INDEX: 30.24 KG/M2 | HEART RATE: 75 BPM | WEIGHT: 243.2 LBS

## 2025-08-04 VITALS
BODY MASS INDEX: 30.21 KG/M2 | SYSTOLIC BLOOD PRESSURE: 139 MMHG | OXYGEN SATURATION: 94 % | WEIGHT: 243 LBS | HEART RATE: 89 BPM | HEIGHT: 75 IN | TEMPERATURE: 97.5 F | DIASTOLIC BLOOD PRESSURE: 81 MMHG

## 2025-08-04 DIAGNOSIS — Z95.0 PRESENCE OF PERMANENT CARDIAC PACEMAKER: ICD-10-CM

## 2025-08-04 DIAGNOSIS — I48.21 ATRIAL FIBRILLATION, PERMANENT (MULTI): ICD-10-CM

## 2025-08-04 DIAGNOSIS — I44.1 ATRIOVENTRICULAR BLOCK, SECOND DEGREE: ICD-10-CM

## 2025-08-04 DIAGNOSIS — Z79.899 HIGH RISK MEDICATION USE: ICD-10-CM

## 2025-08-04 DIAGNOSIS — Z79.01 ANTICOAGULANT LONG-TERM USE: ICD-10-CM

## 2025-08-04 DIAGNOSIS — R06.02 SHORTNESS OF BREATH: ICD-10-CM

## 2025-08-04 DIAGNOSIS — Z78.9 NEVER SMOKED TOBACCO: ICD-10-CM

## 2025-08-04 DIAGNOSIS — I71.21 ANEURYSM OF THE ASCENDING AORTA, WITHOUT RUPTURE: Primary | ICD-10-CM

## 2025-08-04 PROCEDURE — 99214 OFFICE O/P EST MOD 30 MIN: CPT | Performed by: NURSE PRACTITIONER

## 2025-08-04 PROCEDURE — 99214 OFFICE O/P EST MOD 30 MIN: CPT | Performed by: THORACIC SURGERY (CARDIOTHORACIC VASCULAR SURGERY)

## 2025-08-04 PROCEDURE — 3075F SYST BP GE 130 - 139MM HG: CPT | Performed by: THORACIC SURGERY (CARDIOTHORACIC VASCULAR SURGERY)

## 2025-08-04 PROCEDURE — 99212 OFFICE O/P EST SF 10 MIN: CPT | Performed by: THORACIC SURGERY (CARDIOTHORACIC VASCULAR SURGERY)

## 2025-08-04 PROCEDURE — 1159F MED LIST DOCD IN RCRD: CPT | Performed by: NURSE PRACTITIONER

## 2025-08-04 PROCEDURE — 3079F DIAST BP 80-89 MM HG: CPT | Performed by: THORACIC SURGERY (CARDIOTHORACIC VASCULAR SURGERY)

## 2025-08-04 PROCEDURE — 1159F MED LIST DOCD IN RCRD: CPT | Performed by: THORACIC SURGERY (CARDIOTHORACIC VASCULAR SURGERY)

## 2025-08-04 PROCEDURE — 1036F TOBACCO NON-USER: CPT | Performed by: THORACIC SURGERY (CARDIOTHORACIC VASCULAR SURGERY)

## 2025-08-04 PROCEDURE — 1160F RVW MEDS BY RX/DR IN RCRD: CPT | Performed by: NURSE PRACTITIONER

## 2025-08-04 PROCEDURE — 3075F SYST BP GE 130 - 139MM HG: CPT | Performed by: NURSE PRACTITIONER

## 2025-08-04 PROCEDURE — 3078F DIAST BP <80 MM HG: CPT | Performed by: NURSE PRACTITIONER

## 2025-08-04 NOTE — PROGRESS NOTES
"Chief Complaint:  Chief Complaint   Patient presents with    Follow-up     3M follow up       Oc Enriquez \"John" is a 85 y.o. male that presents to the office today for cardiac follow up. He follows with his  primary cardiologist Dr. Bragg and Dr. Potter and was added to my schedule today as a follow up for Dr. Potter.   He  has a PMH of permanent atrial fibrillation, long term anticoagulation, dual chamber pacemaker, hypertension, hyperlipidemia, CAD with remote intervention, shortness of breat, AAA.     At last office visit with Dr. Potter he reported exertional shortness of breath for which an echocardiogram and nuclear stress test were obtained to assess for possible pacemaker upgrade. Testing results as noted below. 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      He reports that he continues to be physically active and independent.  He reports that he continues to have exertional shortness of breath that is stable. Denies chest pain, chest pressure.      I have reviewed echo results with Dr. Potter in office who does not feel pacemaker upgrade would be appropriate at this time.  He suspected that Nikko shortness of breath is secondary to moderate aortic regurgitation.       Testing Reviewed  5/21/2025 Echocardiogram   1. The left ventricular systolic function is low normal, with a visually estimated ejection fraction of 50-55%.   2. No regional wall motion abnormalities.   3. Left ventricular diastolic filling was indeterminate due to right ventricular pacing.   4. Pacemaker device artifact noted in the right atrium and right ventricle.   5. The left atrial size is severely dilated.   6. Mild (1-2+) mitral regurgitation.   7. Mild (1+) tricuspid regurgitation with estimated RVSP of 37 mmHg.   8. 2-3+ aortic regurgitation.   9. Moderate aortic valve regurgitation.  10. The aortic root measures 4.8 cm.      The ascending aorta measures 5.4 cm.      Consider alternative form of imaging " such as CT.  11. The inferior vena cava appears moderately dilated, with IVC inspiratory collapse less than 50%.  12. Comparison study dated 5/24/22 at [facility] showed an LVEF 45-50% with mild AI and TR.  13. There is moderate dilatation of the aortic root.  14. There is severe dilatation of the ascending aorta.    CBC:   Lab Results   Component Value Date    WBC 4.9 05/21/2025    RBC 4.31 05/21/2025    HGB 12.7 (L) 05/21/2025    HCT 39.3 05/21/2025     05/21/2025        CMP:    Lab Results   Component Value Date     05/21/2025    K 4.3 05/21/2025     05/21/2025    CO2 27 05/21/2025    BUN 16 05/21/2025    CREATININE 0.96 05/21/2025    GLUCOSE 112 (H) 05/21/2025    CALCIUM 9.0 05/21/2025       Lipid Profile:    Lab Results   Component Value Date    TRIG 88 05/21/2025    HDL 40 05/21/2025    LDLCALC 70 05/21/2025       BMP:  Lab Results   Component Value Date     05/21/2025     11/25/2024     09/22/2023     01/19/2023     01/07/2022    K 4.3 05/21/2025    K 4.4 11/25/2024    K 4.4 09/22/2023    K 4.1 01/19/2023    K 4.4 01/07/2022     05/21/2025     11/25/2024     09/22/2023     01/19/2023     01/07/2022    CO2 27 05/21/2025    CO2 32 11/25/2024    CO2 32 09/22/2023    CO2 33 (H) 01/19/2023    CO2 31 01/07/2022    BUN 16 05/21/2025    BUN 22 11/25/2024    BUN 12 09/22/2023    BUN 17 01/19/2023    BUN 20 01/07/2022    CREATININE 0.96 05/21/2025    CREATININE 1.13 11/25/2024    CREATININE 1.00 09/22/2023    CREATININE 1.11 01/19/2023    CREATININE 0.93 01/07/2022       CBC:  Lab Results   Component Value Date    WBC 4.9 05/21/2025    WBC 6.1 11/25/2024    WBC 5.1 09/22/2023    WBC 5.1 01/19/2023    WBC 5.8 01/07/2022    RBC 4.31 05/21/2025    RBC 4.20 (L) 11/25/2024    RBC 4.48 (L) 09/22/2023    RBC 4.66 01/19/2023    RBC 5.03 01/07/2022    HGB 12.7 (L) 05/21/2025    HGB 12.6 (L) 11/25/2024    HGB 13.2 (L) 09/22/2023    HGB 13.5 01/19/2023     HGB 14.6 01/07/2022    HCT 39.3 05/21/2025    HCT 38.9 (L) 11/25/2024    HCT 41.2 09/22/2023    HCT 41.5 01/19/2023    HCT 45.8 01/07/2022    MCV 91.2 05/21/2025    MCV 93 11/25/2024    MCV 92 09/22/2023    MCV 89 01/19/2023    MCV 91 01/07/2022    MCH 29.5 05/21/2025    MCH 30.0 11/25/2024    MCHC 32.3 05/21/2025    MCHC 32.4 11/25/2024    MCHC 32.0 09/22/2023    MCHC 32.5 01/19/2023    MCHC 31.9 (L) 01/07/2022    RDW 13.0 05/21/2025    RDW 14.5 11/25/2024    RDW 14.2 09/22/2023    RDW 13.9 01/19/2023    RDW 13.5 01/07/2022     05/21/2025     11/25/2024     09/22/2023     01/19/2023     01/07/2022    MPV 12.5 05/21/2025       Hepatic Function Panel:    Lab Results   Component Value Date    ALKPHOS 44 05/21/2025    ALT 14 05/21/2025    AST 17 05/21/2025    PROT 6.7 05/21/2025    BILITOT 0.8 05/21/2025       HgBA1c:    Lab Results   Component Value Date    HGBA1C 5.9 (H) 06/12/2023       PT/INR:    Lab Results   Component Value Date    PROTIME 22.3 (H) 09/13/2019    INR 2.0 (H) 09/13/2019       Problem List[1]    Social History[2]    Past Medical History:  Diagnosis Date    Sleep apnea 3/2013    Stroke (Multi) 6/1995 partical of calcium shut down blood flow to left eye retna {doctor treated it as a stroke}}       Current Medications[3]    Adhesive tape-silicones    Family History[4]    Surgical History[5]       Review of systems  Constitutional: No weight loss, fever, chills, weakness or fatigue  HEENT: No visual loss, blurred vision, double vision or yellow sclerae  Skin: No rash or itching  Cardiovascular: No chest pain, pressure or discomfort, No palpitations or edema.  Respiratory: Positive for exertional  shortness of breath, denies cough or sputum  Gastrointestinal: No nausea, vomiting or diarrhea. No bloody or dark tarry stools.  Neurological: No headache, lightheadedness, dizziness, syncope.   Musculoskeletal: No muscle, back pain, joint pain or stiffness.  Hematologic: No  "anemia, bleeding or bruising.    /74 (BP Location: Left arm)   Pulse 75   Ht 1.905 m (6' 3\")   Wt 110 kg (243 lb 3.2 oz)   BMI 30.40 kg/m²     Physical Exam  Constitutional: Well developed, awake/alert x 3, no distress.  Head/Neck: No JVD, No bruits  Respiratory/Thorax: patent airways, CTAB, normal breath sounds with good expansion.  Cardiovascular: Irregular, Regular rate and rhythm, no murmurs, normal S1 and S2,   Gastrointestinal: Non distended, soft, non-tender, no rebound tenderness or guarding.  Extremities: No cyanosis, edema.   Neurological: Alert and oriented x 3. Moves extremities spontaneous with purpose.  Psychological: Appropriate mood and behavior  Skin: Warm and Dry. No lesions or rashes.     Assessment/Plan  Permanent atrial fibrillation:  Rate control  Long term anticoagulation: Denies bleeding reports compliance.  Continue Warfarin.  Pacemaker: Dual chamber.   Shortness  of breath:  Exertional.  Suspect due to aortic valve regurgitation;  Ascending aorta aneurysm:  Following with cardiac surgery  Follow with Dr. Potter 3  months or sooner if needed   Follow with Dr. Bragg as scheduled or sooner if needed.     Please excuse any errors in grammar or translation related to dictation, voice recognition software was used to prepare this document.         [1]   Patient Active Problem List  Diagnosis    Accelerated essential hypertension    Atherosclerosis of native coronary artery of native heart without angina pectoris    Atrial fibrillation, permanent (Multi)    Atrioventricular block, second degree    Congestive heart failure, chronic, left-sided    Dilated cardiomyopathy (Multi)    H/O sick sinus syndrome    Hyperlipidemia    Palpitations    Presence of permanent cardiac pacemaker    Pulmonary embolism    Sinoatrial node dysfunction (Multi)    Sleep apnea    Shortness of breath    Third degree AV block (Multi)    Anticoagulant long-term use    High risk medication use    COPD exacerbation " (Multi)    Never smoked tobacco   [2]   Social History  Tobacco Use    Smoking status: Never    Smokeless tobacco: Never   Substance Use Topics    Alcohol use: Yes    Drug use: Never   [3]   Current Outpatient Medications:     acetaminophen (Tylenol Extra Strength) 500 mg tablet, Take 1 tablet (500 mg) by mouth every 4 hours if needed., Disp: , Rfl:     ascorbic acid (Vitamin C) 1,000 mg tablet, TAKE 1 TABLET TWO TO THREE TIMES WEEKLY, Disp: , Rfl:     aspirin 81 mg EC tablet, Take 1 tablet (81 mg) by mouth once daily., Disp: , Rfl:     atorvastatin (Lipitor) 10 mg tablet, Take 1 tablet (10 mg) by mouth once daily., Disp: 90 tablet, Rfl: 3    cholecalciferol (Vitamin D-3) 5,000 Units tablet, Take 1 tablet (125 mcg) by mouth. 4-5 times a week, Disp: , Rfl:     dilTIAZem ER (Tiazac) 180 mg 24 hr capsule, Take 1 capsule (180 mg) by mouth once daily., Disp: 90 capsule, Rfl: 3    hydrALAZINE (Apresoline) 100 mg tablet, 1 TABLET TWICE A DAY, Disp: 180 tablet, Rfl: 3    magnesium oxide 400 mg magnesium capsule, Take 1 capsule (400 mg) by mouth once daily., Disp: , Rfl:     multivitamin (Daily Multi-Vitamin) tablet, Take 1 tablet by mouth once daily., Disp: , Rfl:     nitroglycerin (Nitrostat) 0.4 mg SL tablet, Place 1 tablet (0.4 mg) under the tongue every 5 minutes if needed for chest pain., Disp: 25 tablet, Rfl: 5    propranolol (Inderal) 80 mg tablet, Take 1 tablet (80 mg) by mouth every 12 hours., Disp: 180 tablet, Rfl: 3    tamsulosin (Flomax) 0.4 mg 24 hr capsule, Take 1 tablet by mouth once daily., Disp: , Rfl:     torsemide (Demadex) 20 mg tablet, Take 0.5 tablets (10 mg) by mouth early in the morning.. (Patient taking differently: Take 0.5 tablets (10 mg) by mouth every other day.), Disp: 25 tablet, Rfl: 3    trospium (Sanctura) 20 mg tablet, Take 1 tablet (20 mg) by mouth 2 times a day., Disp: , Rfl:     valsartan (Diovan) 320 mg tablet, Take 1 tablet (320 mg) by mouth once daily., Disp: 90 tablet, Rfl: 3     vitamin E acid succinate (vitamin E succinate) 268 mg (400 unit) tablet, Take 1 tablet by mouth once daily., Disp: , Rfl:     warfarin (Jantoven) 5 mg tablet, Take by mouth once daily. 2.5 mg every Saturday, 5 mg all other days of the week-[facility], Disp: , Rfl:   [4]   Family History  Problem Relation Name Age of Onset    Cancer Father  70 - 79    Heart disease Mother  80 - 99   [5]   Past Surgical History:  Procedure Laterality Date    INSERT / REPLACE / REMOVE PACEMAKER      OTHER SURGICAL HISTORY  09/09/2021    Colonoscopy    OTHER SURGICAL HISTORY  09/09/2021    Corneal lasik    OTHER SURGICAL HISTORY  09/09/2021    Gallbladder surgery    OTHER SURGICAL HISTORY  09/09/2021    Patent foramen ovale repair    OTHER SURGICAL HISTORY  09/09/2021    Varicose vein sclerotherapy    OTHER SURGICAL HISTORY  12/29/2021    Permanent pacemaker insertion    VEIN SURGERY  8/2019 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

## 2025-08-04 NOTE — PROGRESS NOTES
Follow-up visit  Referred by Dr. Bragg for evaluation of his aortic aneurysm        History Of Present Illness:    Nikko Enriquez is a 85 y.o. male presenting with aortic root and ascending aortic aneurysm.      Past Medical History:  He has a past medical history of Sleep apnea (3/2013) and Stroke (Multi) (6/1995 partical of calcium shut down blood flow to left eye retina.     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  Social History:  He reports that he has never smoked. He has never used smokeless tobacco. He reports current alcohol use. He reports that he does not use drugs.     Family History:  [Family History]     [Family History]         Problem Relation Name Age of Onset    Cancer Father   70 - 79    Heart disease Mother   80 - 99         Allergies:  Adhesive tape-silicones     Outpatient Medications:       Current Outpatient Medications   Medication Instructions    acetaminophen (Tylenol Extra Strength) 500 mg tablet 1 tablet, Every 4 hours PRN    ascorbic acid (Vitamin C) 1,000 mg tablet TAKE 1 TABLET TWO TO THREE TIMES WEEKLY    aspirin 81 mg EC tablet 1 tablet, Daily    atorvastatin (LIPITOR) 10 mg, oral, Daily    cholecalciferol (Vitamin D-3) 5,000 Units tablet 1 tablet    dilTIAZem ER (TIAZAC) 180 mg, oral, Daily    hydrALAZINE (Apresoline) 100 mg tablet 1 TABLET TWICE A DAY    magnesium oxide 400 mg magnesium capsule 1 capsule, Daily    multivitamin (Daily Multi-Vitamin) tablet 1 tablet, Daily    nitroglycerin (NITROSTAT) 0.4 mg, sublingual, Every 5 min PRN    propranolol (INDERAL) 80 mg, oral, Every 12 hours    tamsulosin (Flomax) 0.4 mg 24 hr capsule 1 tablet,  "Daily    torsemide (DEMADEX) 10 mg, oral, Daily (0630)    trospium (Sanctura) 20 mg tablet 1 tablet, 2 times daily    valsartan (DIOVAN) 320 mg, oral, Daily    vitamin E acid succinate (vitamin E succinate) 268 mg (400 unit) tablet 1 tablet, Daily    warfarin (Jantoven) 5 mg tablet Daily         Last Recorded Vitals:      Vitals:     07/28/25 1526   BP: 137/79   Pulse: 77   Temp: 36.3 °C (97.3 °F)   SpO2: 94%   Weight: 108 kg (239 lb)   Height: 1.905 m (6' 3\")      Physical Exam:  Constitutional:       Appearance: Normal appearance.      Cardiovascular:      Rate and Rhythm: Normal rate and regular rhythm.   Pulmonary:      Effort: Pulmonary effort is normal.      Breath sounds: Normal breath sounds.      Neurological:      General: No focal deficit present.      Mental Status: He is alert and oriented to person, place, and time.      Psychiatric:         Mood and Affect: Mood normal.         Behavior: Behavior normal.      Last Labs:  CBC -        Lab Results   Component Value Date     WBC 4.9 05/21/2025     HGB 12.7 (L) 05/21/2025     HCT 39.3 05/21/2025     MCV 91.2 05/21/2025      05/21/2025         CMP -        Lab Results   Component Value Date     CALCIUM 9.0 05/21/2025     PROT 6.7 05/21/2025     ALBUMIN 4.2 05/21/2025     AST 17 05/21/2025     ALT 14 05/21/2025     ALKPHOS 44 05/21/2025     BILITOT 0.8 05/21/2025         LIPID PANEL -         Lab Results   Component Value Date     CHOL 127 05/21/2025     TRIG 88 05/21/2025     HDL 40 05/21/2025     CHHDL 3.2 05/21/2025     LDLF 70 09/22/2023     VLDL 22 11/25/2024     NHDL 87 05/21/2025         RENAL FUNCTION PANEL -         Lab Results   Component Value Date     GLUCOSE 112 (H) 05/21/2025      05/21/2025     K 4.3 05/21/2025      05/21/2025     CO2 27 05/21/2025     ANIONGAP 11 11/25/2024     BUN 16 05/21/2025     CREATININE 0.96 05/21/2025     GFRMALE 74 09/22/2023     CALCIUM 9.0 05/21/2025     ALBUMIN 4.2 05/21/2025               Lab " Results   Component Value Date     HGBA1C 5.9 (H) 06/12/2023         Last Cardiology Tests:  ECG:  No results found for this or any previous visit from the past 1095 days.  Echo:  Transthoracic Echo Complete 05/21/2025  CONCLUSIONS:   1. The left ventricular systolic function is low normal, with a visually estimated ejection fraction of 50-55%.   2. No regional wall motion abnormalities.   3. Left ventricular diastolic filling was indeterminate due to right ventricular pacing.   4. Pacemaker device artifact noted in the right atrium and right ventricle.   5. The left atrial size is severely dilated.   6. Mild (1-2+) mitral regurgitation.   7. Mild (1+) tricuspid regurgitation with estimated RVSP of 37 mmHg.   8. 2-3+ aortic regurgitation.   9. Moderate aortic valve regurgitation.  10. The aortic root measures 4.8 cm.      The ascending aorta measures 5.4 cm.      Consider alternative form of imaging such as CT.  11. The inferior vena cava appears moderately dilated, with IVC inspiratory collapse less than 50%.  12. Comparison study dated 5/24/22 at [facility] showed an LVEF 45-50% with mild AI and TR.  13. There is moderate dilatation of the aortic root.  14. There is severe dilatation of the ascending aorta.     Ejection Fractions:        EF   Date/Time Value Ref Range Status   05/21/2025 10:41 AM 53 %     CT chest w/o contrast  The proximal ascending aorta is aneurysmally dilated at 53 mm  diameter. The mid ascending aorta measures 59 mm in diameter. The  anterior aspect of the aortic arch is still dilated at 52 mm  diameter. The mid arch measures 36 mm. The posterior arch measures 38  mm in diameter. The mid descending thoracic aorta measures 37 mm in  diameter. In the hiatus, the distal aorta measures 33 mm.    CT angio 7/30/2025  FINDINGS:  THORACIC AORTIC DIMENSIONS:  Aortic annulus: 33 x 26mm.  Sinus of Valsalva(coronal): 52 mm  Across sinuses(cusp-commissure): 52mm.  Sinotubular junction: 47mm.  Ascending aorta  (RPA level): 52mm.  Upper ascending aorta: 47mm.  Arch(mid): 42mm.  Arch(distal): 38mm.  Descending aorta(PA level): 33mm.  Descending aorta (diaphragm level): 28mm.  Main pulmonary artery: 32mm.  Right pulmonary artery: 31 mm.  Left pulmonary artery: 29mm.  No evidence of thoracic aortic dissection or coarctation.      Aortic Valve: Trileaflet     Assessment/Plan   Mr Enriquez is an 84 yo male patient who presents with an aortic root and ascending aortic aneurysm.  He also has moderate insufficiency of his aortic valve, he had a recent CT angio which showed aortic root and ascending aorta that measures 52 mm  After reviewing relevant clinical data and imaging we had a detailed discussion regarding his aortic pathology   and we discussed risk vs benefit of surgery and he would like to think about it and he will call us back within a week with a decision     Ventura Whitehead MD

## 2025-08-04 NOTE — PROGRESS NOTES
"Chief Complaint:  Chief Complaint   Patient presents with    Follow-up     3M follow up     Oc Enriquez \"John" is a 85 y.o. male that presents to the office today for cardiac follow up. He follows with his  primary cardiologist Dr. Bragg and Dr. Potter and was added to my schedule today as a follow up for Dr. Potter.   He  has a PMH of permanent atrial fibrillation, long term anticoagulation, dual chamber pacemaker, hypertension, hyperlipidemia, CAD with remote intervention, shortness of breat, AAA.     At last office visit with Dr. Potter he reported exertional shortness of breath for which an echocardiogram and nuclear stress test were obtained to assess for possible pacemaker upgrade. Testing results as noted below. 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      He reports that he continues to be physically active and independent.  He reports that he continues to have exertional shortness of breath that is stable. Denies chest pain, chest pressure.      I have reviewed echo results with Dr. Potter in office who does not feel pacemaker upgrade would be appropriate at this time.  He suspected that Nikko shortness of breath is secondary to moderate aortic regurgitation.       Testing Reviewed  5/21/2025 Echocardiogram   1. The left ventricular systolic function is low normal, with a visually estimated ejection fraction of 50-55%.   2. No regional wall motion abnormalities.   3. Left ventricular diastolic filling was indeterminate due to right ventricular pacing.   4. Pacemaker device artifact noted in the right atrium and right ventricle.   5. The left atrial size is severely dilated.   6. Mild (1-2+) mitral regurgitation.   7. Mild (1+) tricuspid regurgitation with estimated RVSP of 37 mmHg.   8. 2-3+ aortic regurgitation.   9. Moderate aortic valve regurgitation.  10. The aortic root measures 4.8 cm.      The ascending aorta measures 5.4 cm.      Consider alternative form of imaging such " as CT.  11. The inferior vena cava appears moderately dilated, with IVC inspiratory collapse less than 50%.  12. Comparison study dated 5/24/22 at [facility] [place] showed an LVEF 45-50% with mild AI and TR.  13. There is moderate dilatation of the aortic root.  14. There is severe dilatation of the ascending aorta.    CBC:   Lab Results   Component Value Date    WBC 4.9 05/21/2025    RBC 4.31 05/21/2025    HGB 12.7 (L) 05/21/2025    HCT 39.3 05/21/2025     05/21/2025        CMP:    Lab Results   Component Value Date     05/21/2025    K 4.3 05/21/2025     05/21/2025    CO2 27 05/21/2025    BUN 16 05/21/2025    CREATININE 0.96 05/21/2025    GLUCOSE 112 (H) 05/21/2025    CALCIUM 9.0 05/21/2025       Lipid Profile:    Lab Results   Component Value Date    TRIG 88 05/21/2025    HDL 40 05/21/2025    LDLCALC 70 05/21/2025       BMP:  Lab Results   Component Value Date     05/21/2025     11/25/2024     09/22/2023     01/19/2023     01/07/2022    K 4.3 05/21/2025    K 4.4 11/25/2024    K 4.4 09/22/2023    K 4.1 01/19/2023    K 4.4 01/07/2022     05/21/2025     11/25/2024     09/22/2023     01/19/2023     01/07/2022    CO2 27 05/21/2025    CO2 32 11/25/2024    CO2 32 09/22/2023    CO2 33 (H) 01/19/2023    CO2 31 01/07/2022    BUN 16 05/21/2025    BUN 22 11/25/2024    BUN 12 09/22/2023    BUN 17 01/19/2023    BUN 20 01/07/2022    CREATININE 0.96 05/21/2025    CREATININE 1.13 11/25/2024    CREATININE 1.00 09/22/2023    CREATININE 1.11 01/19/2023    CREATININE 0.93 01/07/2022       CBC:  Lab Results   Component Value Date    WBC 4.9 05/21/2025    WBC 6.1 11/25/2024    WBC 5.1 09/22/2023    WBC 5.1 01/19/2023    WBC 5.8 01/07/2022    RBC 4.31 05/21/2025    RBC 4.20 (L) 11/25/2024    RBC 4.48 (L) 09/22/2023    RBC 4.66 01/19/2023    RBC 5.03 01/07/2022    HGB 12.7 (L) 05/21/2025    HGB 12.6 (L) 11/25/2024    HGB 13.2 (L) 09/22/2023    HGB 13.5 01/19/2023    HGB  14.6 01/07/2022    HCT 39.3 05/21/2025    HCT 38.9 (L) 11/25/2024    HCT 41.2 09/22/2023    HCT 41.5 01/19/2023    HCT 45.8 01/07/2022    MCV 91.2 05/21/2025    MCV 93 11/25/2024    MCV 92 09/22/2023    MCV 89 01/19/2023    MCV 91 01/07/2022    MCH 29.5 05/21/2025    MCH 30.0 11/25/2024    MCHC 32.3 05/21/2025    MCHC 32.4 11/25/2024    MCHC 32.0 09/22/2023    MCHC 32.5 01/19/2023    MCHC 31.9 (L) 01/07/2022    RDW 13.0 05/21/2025    RDW 14.5 11/25/2024    RDW 14.2 09/22/2023    RDW 13.9 01/19/2023    RDW 13.5 01/07/2022     05/21/2025     11/25/2024     09/22/2023     01/19/2023     01/07/2022    MPV 12.5 05/21/2025       Hepatic Function Panel:    Lab Results   Component Value Date    ALKPHOS 44 05/21/2025    ALT 14 05/21/2025    AST 17 05/21/2025    PROT 6.7 05/21/2025    BILITOT 0.8 05/21/2025       HgBA1c:    Lab Results   Component Value Date    HGBA1C 5.9 (H) 06/12/2023       PT/INR:    Lab Results   Component Value Date    PROTIME 22.3 (H) 09/13/2019    INR 2.0 (H) 09/13/2019       [Problem List]    [Problem List]  Patient Active Problem List  Diagnosis    Accelerated essential hypertension    Atherosclerosis of native coronary artery of native heart without angina pectoris    Atrial fibrillation, permanent (Multi)    Atrioventricular block, second degree    Congestive heart failure, chronic, left-sided    Dilated cardiomyopathy (Multi)    H/O sick sinus syndrome    Hyperlipidemia    Palpitations    Presence of permanent cardiac pacemaker    Pulmonary embolism    Sinoatrial node dysfunction (Multi)    Sleep apnea    Shortness of breath    Third degree AV block (Multi)    Anticoagulant long-term use    High risk medication use    COPD exacerbation (Multi)    Never smoked tobacco       [Social History]    [Social History]  Tobacco Use    Smoking status: Never    Smokeless tobacco: Never   Substance Use Topics    Alcohol use: Yes    Drug use: Never       Past Medical  History:  Diagnosis Date    Sleep apnea 3/2013    Stroke (Multi) 6/1995 partical of calcium shut down blood flow to left eye retna }       [Current Medications]    [Current Medications]    Current Outpatient Medications:     acetaminophen (Tylenol Extra Strength) 500 mg tablet, Take 1 tablet (500 mg) by mouth every 4 hours if needed., Disp: , Rfl:     ascorbic acid (Vitamin C) 1,000 mg tablet, TAKE 1 TABLET TWO TO THREE TIMES WEEKLY, Disp: , Rfl:     aspirin 81 mg EC tablet, Take 1 tablet (81 mg) by mouth once daily., Disp: , Rfl:     atorvastatin (Lipitor) 10 mg tablet, Take 1 tablet (10 mg) by mouth once daily., Disp: 90 tablet, Rfl: 3    cholecalciferol (Vitamin D-3) 5,000 Units tablet, Take 1 tablet (125 mcg) by mouth. 4-5 times a week, Disp: , Rfl:     dilTIAZem ER (Tiazac) 180 mg 24 hr capsule, Take 1 capsule (180 mg) by mouth once daily., Disp: 90 capsule, Rfl: 3    hydrALAZINE (Apresoline) 100 mg tablet, 1 TABLET TWICE A DAY, Disp: 180 tablet, Rfl: 3    magnesium oxide 400 mg magnesium capsule, Take 1 capsule (400 mg) by mouth once daily., Disp: , Rfl:     multivitamin (Daily Multi-Vitamin) tablet, Take 1 tablet by mouth once daily., Disp: , Rfl:     nitroglycerin (Nitrostat) 0.4 mg SL tablet, Place 1 tablet (0.4 mg) under the tongue every 5 minutes if needed for chest pain., Disp: 25 tablet, Rfl: 5    propranolol (Inderal) 80 mg tablet, Take 1 tablet (80 mg) by mouth every 12 hours., Disp: 180 tablet, Rfl: 3    tamsulosin (Flomax) 0.4 mg 24 hr capsule, Take 1 tablet by mouth once daily., Disp: , Rfl:     torsemide (Demadex) 20 mg tablet, Take 0.5 tablets (10 mg) by mouth early in the morning.. (Patient taking differently: Take 0.5 tablets (10 mg) by mouth every other day.), Disp: 25 tablet, Rfl: 3    trospium (Sanctura) 20 mg tablet, Take 1 tablet (20 mg) by mouth 2 times a day., Disp: , Rfl:     valsartan (Diovan) 320 mg tablet, Take 1 tablet (320 mg) by mouth once daily., Disp: 90 tablet, Rfl: 3     "vitamin E acid succinate (vitamin E succinate) 268 mg (400 unit) tablet, Take 1 tablet by mouth once daily., Disp: , Rfl:     warfarin (Jantoven) 5 mg tablet, Take by mouth once daily. 2.5 mg every Saturday, 5 mg all other days of the week-[facility], Disp: , Rfl:       Adhesive tape-silicones    [Family History]    [Family History]  Problem Relation Name Age of Onset    Cancer Father  70 - 79    Heart disease Mother  80 - 99       [Surgical History]     [Surgical History]  Past Surgical History  Procedure Laterality Date    INSERT / REPLACE / REMOVE PACEMAKER      OTHER SURGICAL HISTORY  09/09/2021    Colonoscopy    OTHER SURGICAL HISTORY  09/09/2021    Corneal lasik    OTHER SURGICAL HISTORY  09/09/2021    Gallbladder surgery    OTHER SURGICAL HISTORY  09/09/2021    Patent foramen ovale repair    OTHER SURGICAL HISTORY  09/09/2021    Varicose vein sclerotherapy    OTHER SURGICAL HISTORY  12/29/2021    Permanent pacemaker insertion    VEIN SURGERY  8/2019 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         Review of systems  Constitutional: No weight loss, fever, chills, weakness or fatigue  HEENT: No visual loss, blurred vision, double vision or yellow sclerae  Skin: No rash or itching  Cardiovascular: No chest pain, pressure or discomfort, No palpitations or edema.  Respiratory: Positive for exertional  shortness of breath, denies cough or sputum  Gastrointestinal: No nausea, vomiting or diarrhea. No bloody or dark tarry stools.  Neurological: No headache, lightheadedness, dizziness, syncope.   Musculoskeletal: No muscle, back pain, joint pain or stiffness.  Hematologic: No anemia, bleeding or bruising.    /74 (BP Location: Left arm)   Pulse 75   Ht 1.905 m (6' 3\")   Wt 110 kg (243 lb 3.2 oz)   BMI 30.40 kg/m²     Physical Exam  Constitutional: Well developed, awake/alert x 3, no distress.  Head/Neck: No JVD, No bruits  Respiratory/Thorax: patent airways, CTAB, normal breath sounds with good " expansion.  Cardiovascular: Irregular, Regular rate and rhythm, no murmurs, normal S1 and S2,   Gastrointestinal: Non distended, soft, non-tender, no rebound tenderness or guarding.  Extremities: No cyanosis, edema.   Neurological: Alert and oriented x 3. Moves extremities spontaneous with purpose.  Psychological: Appropriate mood and behavior  Skin: Warm and Dry. No lesions or rashes.     Assessment/Plan  Permanent atrial fibrillation:  Rate control  Long term anticoagulation: Denies bleeding reports compliance.  Continue Warfarin.  Pacemaker: Dual chamber.   Shortness  of breath:  Exertional.  Suspect due to aortic valve regurgitation;  Ascending aorta aneurysm:  Following with cardiac surgery  Follow with Dr. Potter 3  months or sooner if needed   Follow with Dr. Bragg as scheduled or sooner if needed.     Please excuse any errors in grammar or translation related to dictation, voice recognition software was used to prepare this document.

## 2025-08-22 ENCOUNTER — PREP FOR PROCEDURE (OUTPATIENT)
Dept: VASCULAR SURGERY | Facility: HOSPITAL | Age: 85
End: 2025-08-22
Payer: MEDICARE

## 2025-08-22 DIAGNOSIS — I35.0 NONRHEUMATIC AORTIC VALVE STENOSIS: Primary | ICD-10-CM

## 2025-08-22 DIAGNOSIS — I77.810 AORTIC ROOT DILATATION: ICD-10-CM

## 2025-08-22 RX ORDER — CHLORHEXIDINE GLUCONATE ORAL RINSE 1.2 MG/ML
15 SOLUTION DENTAL 2 TIMES DAILY
OUTPATIENT
Start: 2025-08-22 | End: 2025-08-23

## 2025-08-22 RX ORDER — ACETAMINOPHEN 325 MG/1
975 TABLET ORAL ONCE
OUTPATIENT
Start: 2025-08-22 | End: 2025-08-22

## 2025-08-22 RX ORDER — MUPIROCIN 20 MG/G
0.5 OINTMENT TOPICAL 2 TIMES DAILY
OUTPATIENT
Start: 2025-08-22 | End: 2025-08-27

## 2025-08-27 DIAGNOSIS — R07.9 CHEST PAIN, UNSPECIFIED TYPE: ICD-10-CM

## 2025-08-27 DIAGNOSIS — E78.2 MIXED HYPERLIPIDEMIA: ICD-10-CM

## 2025-08-27 DIAGNOSIS — I77.810 AORTIC ROOT DILATATION: ICD-10-CM

## 2025-08-27 DIAGNOSIS — I71.21 ANEURYSM OF THE ASCENDING AORTA, WITHOUT RUPTURE: Primary | ICD-10-CM

## 2025-08-27 DIAGNOSIS — R79.9 ABNORMAL FINDING OF BLOOD CHEMISTRY, UNSPECIFIED: ICD-10-CM

## 2025-08-27 DIAGNOSIS — I35.0 NONRHEUMATIC AORTIC VALVE STENOSIS: ICD-10-CM

## 2025-08-27 DIAGNOSIS — R93.89 ABNORMAL FINDINGS ON DIAGNOSTIC IMAGING OF OTHER SPECIFIED BODY STRUCTURES: ICD-10-CM

## 2025-08-27 RX ORDER — CHLORHEXIDINE GLUCONATE 40 MG/ML
SOLUTION TOPICAL ONCE
Qty: 50 ML | Refills: 0 | Status: SHIPPED | OUTPATIENT
Start: 2025-08-27 | End: 2025-08-27

## 2025-08-27 RX ORDER — CHLORHEXIDINE GLUCONATE ORAL RINSE 1.2 MG/ML
15 SOLUTION DENTAL ONCE
Qty: 120 ML | Refills: 0 | Status: SHIPPED | OUTPATIENT
Start: 2025-08-27 | End: 2025-08-27

## 2025-08-29 ENCOUNTER — ANTI-COAG VISIT (OUTPATIENT)
Age: 85
End: 2025-08-29
Payer: MEDICARE

## 2025-08-29 DIAGNOSIS — T81.718S: Primary | ICD-10-CM

## 2025-08-29 DIAGNOSIS — I26.99: Primary | ICD-10-CM

## 2025-08-29 LAB
INTERNATIONAL NORMALIZATION RATIO, POC: 2.7
PROTHROMBIN TIME, POC: NORMAL

## 2025-08-29 PROCEDURE — 99211 OFF/OP EST MAY X REQ PHY/QHP: CPT

## 2025-08-29 PROCEDURE — 85610 PROTHROMBIN TIME: CPT

## 2025-11-03 ENCOUNTER — APPOINTMENT (OUTPATIENT)
Dept: CARDIOLOGY | Facility: CLINIC | Age: 85
End: 2025-11-03
Payer: MEDICARE

## 2025-12-05 ENCOUNTER — APPOINTMENT (OUTPATIENT)
Dept: CARDIOLOGY | Facility: CLINIC | Age: 85
End: 2025-12-05
Payer: MEDICARE